# Patient Record
Sex: MALE | Race: WHITE | NOT HISPANIC OR LATINO | Employment: FULL TIME | URBAN - METROPOLITAN AREA
[De-identification: names, ages, dates, MRNs, and addresses within clinical notes are randomized per-mention and may not be internally consistent; named-entity substitution may affect disease eponyms.]

---

## 2017-05-22 ENCOUNTER — OFFICE VISIT - HEALTHEAST (OUTPATIENT)
Dept: FAMILY MEDICINE | Facility: CLINIC | Age: 47
End: 2017-05-22

## 2017-05-22 DIAGNOSIS — R00.0 TACHYCARDIA: ICD-10-CM

## 2017-05-22 DIAGNOSIS — J18.9 PNEUMONIA: ICD-10-CM

## 2017-05-22 DIAGNOSIS — R05.9 COUGH: ICD-10-CM

## 2017-05-22 DIAGNOSIS — J02.0 STREP THROAT: ICD-10-CM

## 2017-05-22 DIAGNOSIS — R50.9 FEVER: ICD-10-CM

## 2017-12-04 ENCOUNTER — OFFICE VISIT - HEALTHEAST (OUTPATIENT)
Dept: FAMILY MEDICINE | Facility: CLINIC | Age: 47
End: 2017-12-04

## 2017-12-04 DIAGNOSIS — Z00.00 HEALTHCARE MAINTENANCE: ICD-10-CM

## 2017-12-04 DIAGNOSIS — Z13.220 SCREENING CHOLESTEROL LEVEL: ICD-10-CM

## 2017-12-04 DIAGNOSIS — R07.9 EXERTIONAL CHEST PAIN: ICD-10-CM

## 2017-12-04 LAB
CHOLEST SERPL-MCNC: 164 MG/DL
FASTING STATUS PATIENT QL REPORTED: YES
HDLC SERPL-MCNC: 45 MG/DL
LDLC SERPL CALC-MCNC: 109 MG/DL
TRIGL SERPL-MCNC: 50 MG/DL

## 2017-12-04 ASSESSMENT — MIFFLIN-ST. JEOR: SCORE: 1782.52

## 2017-12-18 ENCOUNTER — HOSPITAL ENCOUNTER (OUTPATIENT)
Dept: CARDIOLOGY | Facility: CLINIC | Age: 47
Discharge: HOME | End: 2017-12-18
Attending: FAMILY MEDICINE

## 2017-12-18 DIAGNOSIS — R07.9 EXERTIONAL CHEST PAIN: ICD-10-CM

## 2017-12-18 LAB
CV STRESS CURRENT BP HE: NORMAL
CV STRESS CURRENT HR HE: 103
CV STRESS CURRENT HR HE: 104
CV STRESS CURRENT HR HE: 106
CV STRESS CURRENT HR HE: 112
CV STRESS CURRENT HR HE: 112
CV STRESS CURRENT HR HE: 114
CV STRESS CURRENT HR HE: 117
CV STRESS CURRENT HR HE: 119
CV STRESS CURRENT HR HE: 125
CV STRESS CURRENT HR HE: 131
CV STRESS CURRENT HR HE: 137
CV STRESS CURRENT HR HE: 138
CV STRESS CURRENT HR HE: 141
CV STRESS CURRENT HR HE: 147
CV STRESS CURRENT HR HE: 155
CV STRESS CURRENT HR HE: 160
CV STRESS CURRENT HR HE: 160
CV STRESS CURRENT HR HE: 80
CV STRESS CURRENT HR HE: 90
CV STRESS CURRENT HR HE: 91
CV STRESS CURRENT HR HE: 92
CV STRESS CURRENT HR HE: 94
CV STRESS CURRENT HR HE: 94
CV STRESS CURRENT HR HE: 97
CV STRESS CURRENT HR HE: 98
CV STRESS CURRENT HR HE: 99
CV STRESS DEVIATION TIME HE: NORMAL
CV STRESS ECHO PERCENT HR HE: NORMAL
CV STRESS EXERCISE STAGE HE: NORMAL
CV STRESS FINAL RESTING BP HE: NORMAL
CV STRESS FINAL RESTING HR HE: 91
CV STRESS MAX HR HE: 161
CV STRESS MAX TREADMILL GRADE HE: 16
CV STRESS MAX TREADMILL SPEED HE: 4.2
CV STRESS PEAK DIA BP HE: NORMAL
CV STRESS PEAK SYS BP HE: NORMAL
CV STRESS PHASE HE: NORMAL
CV STRESS PROTOCOL HE: NORMAL
CV STRESS RESTING PT POSITION HE: NORMAL
CV STRESS RESTING PT POSITION HE: NORMAL
CV STRESS ST DEVIATION AMOUNT HE: NORMAL
CV STRESS ST DEVIATION ELEVATION HE: NORMAL
CV STRESS ST EVELATION AMOUNT HE: NORMAL
CV STRESS TEST TYPE HE: NORMAL
CV STRESS TOTAL STAGE TIME MIN 1 HE: NORMAL
ECHO EJECTION FRACTION ESTIMATED: 60 %
STRESS ECHO BASELINE BP: NORMAL
STRESS ECHO BASELINE HR: 81
STRESS ECHO CALCULATED PERCENT HR: 93 %
STRESS ECHO LAST STRESS BP: NORMAL
STRESS ECHO LAST STRESS HR: 160
STRESS ECHO POST ESTIMATED WORKLOAD: 12.1
STRESS ECHO POST EXERCISE DUR MIN: 10
STRESS ECHO POST EXERCISE DUR SEC: 20
STRESS ECHO TARGET HR: 147

## 2018-01-08 ENCOUNTER — OFFICE VISIT - HEALTHEAST (OUTPATIENT)
Dept: FAMILY MEDICINE | Facility: CLINIC | Age: 48
End: 2018-01-08

## 2018-01-08 DIAGNOSIS — R07.89 OTHER CHEST PAIN: ICD-10-CM

## 2018-01-08 ASSESSMENT — MIFFLIN-ST. JEOR: SCORE: 1796.13

## 2018-01-10 ENCOUNTER — OFFICE VISIT - HEALTHEAST (OUTPATIENT)
Dept: CARDIOLOGY | Facility: CLINIC | Age: 48
End: 2018-01-10

## 2018-01-10 DIAGNOSIS — I20.0 UNSTABLE ANGINA PECTORIS (H): ICD-10-CM

## 2018-01-10 ASSESSMENT — MIFFLIN-ST. JEOR: SCORE: 1796.13

## 2018-01-18 ENCOUNTER — COMMUNICATION - HEALTHEAST (OUTPATIENT)
Dept: CARDIOLOGY | Facility: CLINIC | Age: 48
End: 2018-01-18

## 2018-01-23 ENCOUNTER — HOSPITAL ENCOUNTER (OUTPATIENT)
Dept: CT IMAGING | Facility: CLINIC | Age: 48
Discharge: HOME | End: 2018-01-23
Attending: INTERNAL MEDICINE

## 2018-01-23 DIAGNOSIS — I20.0 UNSTABLE ANGINA PECTORIS (H): ICD-10-CM

## 2018-01-23 LAB
CREAT BLD-MCNC: 0.9 MG/DL
POC GFR AMER AF HE - HISTORICAL: >60 ML/MIN/1.73M2
POC GFR NON AMER AF HE - HISTORICAL: >60 ML/MIN/1.73M2

## 2018-01-23 ASSESSMENT — MIFFLIN-ST. JEOR: SCORE: 1796.13

## 2018-02-05 ENCOUNTER — OFFICE VISIT - HEALTHEAST (OUTPATIENT)
Dept: FAMILY MEDICINE | Facility: CLINIC | Age: 48
End: 2018-02-05

## 2018-02-05 DIAGNOSIS — J35.1 SWELLING OF TONSIL: ICD-10-CM

## 2018-02-05 DIAGNOSIS — J02.9 SORE THROAT: ICD-10-CM

## 2018-02-05 ASSESSMENT — MIFFLIN-ST. JEOR: SCORE: 1805.2

## 2018-02-16 ENCOUNTER — COMMUNICATION - HEALTHEAST (OUTPATIENT)
Dept: FAMILY MEDICINE | Facility: CLINIC | Age: 48
End: 2018-02-16

## 2018-03-03 ENCOUNTER — COMMUNICATION - HEALTHEAST (OUTPATIENT)
Dept: FAMILY MEDICINE | Facility: CLINIC | Age: 48
End: 2018-03-03

## 2018-03-11 ENCOUNTER — COMMUNICATION - HEALTHEAST (OUTPATIENT)
Dept: FAMILY MEDICINE | Facility: CLINIC | Age: 48
End: 2018-03-11

## 2018-03-28 ENCOUNTER — COMMUNICATION - HEALTHEAST (OUTPATIENT)
Dept: FAMILY MEDICINE | Facility: CLINIC | Age: 48
End: 2018-03-28

## 2018-05-18 ENCOUNTER — HOSPITAL ENCOUNTER (EMERGENCY)
Facility: CLINIC | Age: 48
Discharge: HOME | End: 2018-05-19
Attending: EMERGENCY MEDICINE | Admitting: EMERGENCY MEDICINE
Payer: OTHER MISCELLANEOUS

## 2018-05-18 VITALS
SYSTOLIC BLOOD PRESSURE: 132 MMHG | OXYGEN SATURATION: 95 % | HEIGHT: 67 IN | BODY MASS INDEX: 34.21 KG/M2 | TEMPERATURE: 98.9 F | WEIGHT: 218 LBS | HEART RATE: 94 BPM | DIASTOLIC BLOOD PRESSURE: 86 MMHG | RESPIRATION RATE: 14 BRPM

## 2018-05-18 DIAGNOSIS — S61.512A WRIST LACERATION, LEFT, INITIAL ENCOUNTER: ICD-10-CM

## 2018-05-18 PROCEDURE — 99283 EMERGENCY DEPT VISIT LOW MDM: CPT

## 2018-05-18 PROCEDURE — 12001 RPR S/N/AX/GEN/TRNK 2.5CM/<: CPT | Mod: LT

## 2018-05-18 RX ORDER — MULTIPLE VITAMINS W/ MINERALS TAB 9MG-400MCG
1 TAB ORAL DAILY
COMMUNITY

## 2018-05-18 RX ORDER — LORATADINE 10 MG/1
10 TABLET ORAL DAILY
COMMUNITY
End: 2022-05-05

## 2018-05-18 NOTE — ED AVS SNAPSHOT
[facility] Emergency Department    [address] [place] [address]    Phone:  [phone]    Fax:  [phone]                                       Raheem Jeong   MRN: [id number]    Department:  [facility] Emergency Department   Date of Visit:  5/18/2018           Patient Information     Date Of Birth          1970        Your diagnoses for this visit were:     Wrist laceration, left, initial encounter        You were seen by Marleny Robertson MD.      Follow-up Information     Follow up with Clinic, [facility] In 1 week.    Why:  For suture removal    Contact information:    [address]  [facility] [id number] [phone]          Discharge Instructions          * LACERATION (All Closures)  A laceration is a cut through the skin. This will usually require stitches (sutures) or staples if it is deep. Minor cuts may be treated with a tape closure ( Steri-Strips ) or Dermabond skin glue.       HOME CARE:  PAIN MEDICINE: You may use acetaminophen (Tylenol) 650-1000 mg every 6 hours or ibuprofen (Motrin, Advil) 600 mg every 6-8 hours with food to control pain, if you are able to take these medicines. [NOTE: If you have chronic liver or kidney disease or ever had a stomach ulcer or GI bleeding, talk with your doctor before using these medicines.]  EXTREMITY, FACE or TRUNK WOUNDS:    Keep the wound clean and dry. If a bandage was applied and it becomes wet or dirty, replace it. Otherwise, leave it in place for the first 24 hours.    If stitches or staples were used, clean the wound daily. Protect the wound from sunlight and tanning lamps.    After removing the bandage, wash the area with soap and water. Use a wet cotton swab (Q tip) to loosen and remove any blood or crust that forms.    After cleaning, apply a thin layer of Polysporin or Bacitracin ointment. This will keep the wound clean and make it easier to remove the stitches or staples. Reapply a  fresh bandage.    You may remove the bandage to shower as usual after the first 24 hours, but do not soak the area in water (no swimming) until the stitches or staples are removed.    If Steri-Strips were used, keep the area clean and dry. If it becomes wet, blot it dry with a towel. It is okay to take a brief shower, but avoid scrubbing the area.    If Dermabond skin adhesive was used, do not scratch, rub or pick at the adhesive film. Do not place tape directly over the film. Do not apply liquid, ointment or creams to the wound while the film is in place. Do not clean the wound with peroxide and do not apply ointments. Avoid activities that cause heavy sweating until the film has fallen off. Protect the wound from prolonged exposure to sunlight or tanning lamps. You may shower as usual but do not soak the wound in water (no baths or swimming). The film will fall off by itself in 5-10 days.  SCALP WOUNDS: During the first two days, you may carefully rinse your hair in the shower to remove blood, glass or dirt particles. After two days, you may shower and shampoo your hair normally. Do not soak your scalp in the tub or go swimming until the stitches or staples have been removed.  MOUTH WOUNDS: Eat soft foods to reduce pain. If the cut is inside of your mouth, clean by rinsing after each meal and at bedtime with a mixture of equal parts water and Hydrogen Peroxide (do not swallow!). Or, you can use a cotton swab to directly apply Hydrogen Peroxide onto the cut.  After the wound is done healing, use sunscreen over the area whenever exposed for the next 6 minths to avoid a darker scar.     FOLLOW UP: Most skin wounds heal within ten days. Mouth and facial wounds heal within five days. However, even with proper treatment, a wound infection may sometimes occur. Therefore, you should check the wound daily for signs of infection listed below.  Stitches should be removed from the face within five days; stitches and staples  should be removed from other parts of the body within 7-10 days. Unless you are told to come back to the emergency room, you may have your doctor or urgent care remove the stitches. If dissolving stitches were used in the mouth, these will fall out or dissolve without the need for removal. If tape closures ( Steri-Strips ) were used, remove them yourself if they have not fallen off after 7 days. If Dermabond skin glue was used, the film will fall off by itself in 5-10 days.      GET PROMPT MEDICAL ATTENTION  if any of the following occur:    Increasing pain in the wound    Redness, swelling or pus coming from the wound    Fever over 101 F (38.3 C) oral    If stitches or staples come apart or fall out or if Steri-Strips fall off before seven days    If the wound edges re-open    Bleeding not controlled by direct pressure    8349-0866 [employer]. [address] [place] [address]. All rights reserved. This information is not intended as a substitute for professional medical care. Always follow your healthcare professional's instructions.  This information has been modified by your health care provider with permission from the publisher.      24 Hour Appointment Hotline       To make an appointment at any [facility], call [phone] ([phone]). If you don't have a family doctor or clinic, we will help you find one. [facility] are conveniently located to serve the needs of you and your family.             Review of your medicines      Our records show that you are taking the medicines listed below. If these are incorrect, please call your family doctor or clinic.        Dose / Directions Last dose taken    loratadine 10 MG tablet   Commonly known as:  CLARITIN   Dose:  10 mg        Take 10 mg by mouth daily   Refills:  0        Multi-vitamin Tabs tablet   Dose:  1 tablet        Take 1 tablet by mouth daily   Refills:  0                Procedures and tests performed during your  visit     XR Wrist Left 2 Views      Orders Needing Specimen Collection     None      Pending Results     No orders found for last 3 day(s).            Pending Culture Results     No orders found for last 3 day(s).            Pending Results Instructions     If you had any lab results that were not finalized at the time of your Discharge, you can call the ED Lab Result RN at [phone]. You will be contacted by this team for any positive Lab results or changes in treatment. The nurses are available 7 days a week from 10A to 6:30P.  You can leave a message 24 hours per day and they will return your call.        Test Results From Your Hospital Stay        5/19/2018  1:04 AM      Narrative     XR WRIST LT  2 VW 5/19/2018 1:01 AM    HISTORY: Puncture wound.    COMPARISON: None.        Impression     IMPRESSION: No evidence of acute fracture or malalignment. No bony  abnormalities. No radiopaque foreign bodies appreciated.    DARREN RUFFIN MD                Clinical Quality Measure: Blood Pressure Screening     Your blood pressure was checked while you were in the emergency department today. The last reading we obtained was  BP: 132/86 . Please read the guidelines below about what these numbers mean and what you should do about them.  If your systolic blood pressure (the top number) is less than 120 and your diastolic blood pressure (the bottom number) is less than 80, then your blood pressure is normal. There is nothing more that you need to do about it.  If your systolic blood pressure (the top number) is 120-139 or your diastolic blood pressure (the bottom number) is 80-89, your blood pressure may be higher than it should be. You should have your blood pressure rechecked within a year by a primary care provider.  If your systolic blood pressure (the top number) is 140 or greater or your diastolic blood pressure (the bottom number) is 90 or greater, you may have high blood pressure. High blood pressure is treatable,  "but if left untreated over time it can put you at risk for heart attack, stroke, or kidney failure. You should have your blood pressure rechecked by a primary care provider within the next 4 weeks.  If your provider in the emergency department today gave you specific instructions to follow-up with your doctor or provider even sooner than that, you should follow that instruction and not wait for up to 4 weeks for your follow-up visit.        Thank you for choosing [facility]       Thank you for choosing [facility] for your care. Our goal is always to provide you with excellent care. Hearing back from our patients is one way we can continue to improve our services. Please take a few minutes to complete the written survey that you may receive in the mail after you visit with us. Thank you!        [employer] [facility] Information     [employer] lets you send messages to your doctor, view your test results, renew your prescriptions, schedule appointments and more. To sign up, go to [email or web address] . Click on \"Log in\" on the left side of the screen, which will take you to the Welcome page. Then click on \"Sign up Now\" on the right side of the page.     You will be asked to enter the access code listed below, as well as some personal information. Please follow the directions to create your username and password.     Your access code is: [email or web address]  Expires: 2018  1:48 AM     Your access code will  in 90 days. If you need help or a new code, please call your [facility] clinic or [phone].        Care EveryWhere ID     This is your Care EveryWhere ID. This could be used by other organizations to access your [facility] medical records  [id number]        Equal Access to Services     [facility] : Rivera Whitmore, adarsh arguello, lars aguilar. So [facility] [phone].    ATENCIÓN: Si habla español, tiene a clayton disposición servicios gratuitos de asistencia " brittany Stoddarddominic al [phone].    We comply with applicable federal civil rights laws and [place] laws. We do not discriminate on the basis of race, color, national origin, age, disability, sex, sexual orientation, or gender identity.            After Visit Summary       This is your record. Keep this with you and show to your community pharmacist(s) and doctor(s) at your next visit.

## 2018-05-18 NOTE — LETTER
May 18, 2018      To Whom It May Concern:      Raheem Jeong was seen in our Emergency Department today, 05/18/18.  I expect his condition to improve over the next 4 days.  He may return to work 05/22/18.    Sincerely,        Karen Eden RN

## 2018-05-18 NOTE — LETTER
May 18, 2018      To Whom It May Concern:      Raheem Jeong was seen in our Emergency Department today, 05/18/18.  I expect his condition to improve over the next 3-4 days.  He may return to work when improved.    Sincerely,        BAUDILIO Cobos RN for Dr. Marcelo MD

## 2018-05-18 NOTE — ED AVS SNAPSHOT
[facility] Emergency Department    [address]    [facility] [address]    Phone:  [phone]    Fax:  [phone]                                       Raheem Jeong   MRN: [id number]    Department:  [facility] Emergency Department   Date of Visit:  5/18/2018           After Visit Summary Signature Page     I have received my discharge instructions, and my questions have been answered. I have discussed any challenges I see with this plan with the nurse or doctor.    ..........................................................................................................................................  Patient/Patient Representative Signature      ..........................................................................................................................................  Patient Representative Print Name and Relationship to Patient    ..................................................               ................................................  Date                                            Time    ..........................................................................................................................................  Reviewed by Signature/Title    ...................................................              ..............................................  Date                                                            Time

## 2018-05-19 ENCOUNTER — APPOINTMENT (OUTPATIENT)
Dept: GENERAL RADIOLOGY | Facility: CLINIC | Age: 48
End: 2018-05-19
Attending: EMERGENCY MEDICINE
Payer: OTHER MISCELLANEOUS

## 2018-05-19 ENCOUNTER — RECORDS - HEALTHEAST (OUTPATIENT)
Dept: ADMINISTRATIVE | Facility: OTHER | Age: 48
End: 2018-05-19

## 2018-05-19 PROCEDURE — 73100 X-RAY EXAM OF WRIST: CPT | Mod: LT

## 2018-05-19 RX ORDER — BUPIVACAINE HYDROCHLORIDE 5 MG/ML
INJECTION, SOLUTION PERINEURAL
Status: DISCONTINUED
Start: 2018-05-19 | End: 2018-05-19 | Stop reason: HOSPADM

## 2018-05-19 ASSESSMENT — ENCOUNTER SYMPTOMS
ARTHRALGIAS: 1
WOUND: 1
JOINT SWELLING: 1

## 2018-05-19 NOTE — DISCHARGE INSTRUCTIONS
* LACERATION (All Closures)  A laceration is a cut through the skin. This will usually require stitches (sutures) or staples if it is deep. Minor cuts may be treated with a tape closure ( Steri-Strips ) or Dermabond skin glue.       HOME CARE:  PAIN MEDICINE: You may use acetaminophen (Tylenol) 650-1000 mg every 6 hours or ibuprofen (Motrin, Advil) 600 mg every 6-8 hours with food to control pain, if you are able to take these medicines. [NOTE: If you have chronic liver or kidney disease or ever had a stomach ulcer or GI bleeding, talk with your doctor before using these medicines.]  EXTREMITY, FACE or TRUNK WOUNDS:    Keep the wound clean and dry. If a bandage was applied and it becomes wet or dirty, replace it. Otherwise, leave it in place for the first 24 hours.    If stitches or staples were used, clean the wound daily. Protect the wound from sunlight and tanning lamps.    After removing the bandage, wash the area with soap and water. Use a wet cotton swab (Q tip) to loosen and remove any blood or crust that forms.    After cleaning, apply a thin layer of Polysporin or Bacitracin ointment. This will keep the wound clean and make it easier to remove the stitches or staples. Reapply a fresh bandage.    You may remove the bandage to shower as usual after the first 24 hours, but do not soak the area in water (no swimming) until the stitches or staples are removed.    If Steri-Strips were used, keep the area clean and dry. If it becomes wet, blot it dry with a towel. It is okay to take a brief shower, but avoid scrubbing the area.    If Dermabond skin adhesive was used, do not scratch, rub or pick at the adhesive film. Do not place tape directly over the film. Do not apply liquid, ointment or creams to the wound while the film is in place. Do not clean the wound with peroxide and do not apply ointments. Avoid activities that cause heavy sweating until the film has fallen off. Protect the wound from prolonged  exposure to sunlight or tanning lamps. You may shower as usual but do not soak the wound in water (no baths or swimming). The film will fall off by itself in 5-10 days.  SCALP WOUNDS: During the first two days, you may carefully rinse your hair in the shower to remove blood, glass or dirt particles. After two days, you may shower and shampoo your hair normally. Do not soak your scalp in the tub or go swimming until the stitches or staples have been removed.  MOUTH WOUNDS: Eat soft foods to reduce pain. If the cut is inside of your mouth, clean by rinsing after each meal and at bedtime with a mixture of equal parts water and Hydrogen Peroxide (do not swallow!). Or, you can use a cotton swab to directly apply Hydrogen Peroxide onto the cut.  After the wound is done healing, use sunscreen over the area whenever exposed for the next 6 minths to avoid a darker scar.     FOLLOW UP: Most skin wounds heal within ten days. Mouth and facial wounds heal within five days. However, even with proper treatment, a wound infection may sometimes occur. Therefore, you should check the wound daily for signs of infection listed below.  Stitches should be removed from the face within five days; stitches and staples should be removed from other parts of the body within 7-10 days. Unless you are told to come back to the emergency room, you may have your doctor or urgent care remove the stitches. If dissolving stitches were used in the mouth, these will fall out or dissolve without the need for removal. If tape closures ( Steri-Strips ) were used, remove them yourself if they have not fallen off after 7 days. If Dermabond skin glue was used, the film will fall off by itself in 5-10 days.      GET PROMPT MEDICAL ATTENTION  if any of the following occur:    Increasing pain in the wound    Redness, swelling or pus coming from the wound    Fever over 101 F (38.3 C) oral    If stitches or staples come apart or fall out or if Steri-Strips fall  off before seven days    If the wound edges re-open    Bleeding not controlled by direct pressure    [id number] [employer]. [address] [place] [address], [place] [address]. All rights reserved. This information is not intended as a substitute for professional medical care. Always follow your healthcare professional's instructions.  This information has been modified by your health care provider with permission from the publisher.

## 2018-05-19 NOTE — ED TRIAGE NOTES
Pt reports he was changing a blade in a machine at work and tightening it with a wrench when the bar with the blades in it tipped over and cut his left hand. Pt states it was very deep, pt is having pain with moving his hand. Injury happened at about 10:10 pm tonight. Unknown last tetanus.

## 2018-05-19 NOTE — ED PROVIDER NOTES
"  History     Chief Complaint:  Laceration      HPI   Raheem Jeong is a 47 year old male who presents to the emergency department for evaluation of left wrist laceration. The patient states that he was changing the utility blade on a piece of machinery while working this evening when the blade slipped, causing him to sustain a laceration to the dorsum of his left wrist. The patient feels that this laceration is fairly deep and had some subsequent swelling and pain to his left wrist, especially with movement of his fingers. This prompted his ED visit. He denies sustaining any other injuries as a result of this incident. Of note, the patient's last tetanus vaccination was 2012.    Allergies:  No Known Allergies     Medications:    Loratadine  Multivitamins    Past Medical History:    ANTHONY    Past Surgical History:    Hernia Repair    Family History:    No past pertinent family history.    Social History:  Presents alone.   Marital Status:   [2]    Review of Systems   Musculoskeletal: Positive for arthralgias (Left Wrist) and joint swelling.   Skin: Positive for wound.   All other systems reviewed and are negative.    Physical Exam     Patient Vitals for the past 24 hrs:   BP Temp Temp src Pulse Resp SpO2 Height Weight   05/18/18 2340 132/86 98.9  F (37.2  C) Oral 94 14 95 % 1.702 m (5' 7\") 98.9 kg (218 lb)       Physical Exam  General: Patient is alert and interactive when I enter the room  Head:  The scalp, face, and head appear normal  Eyes:  Conjunctivae are normal  ENT:    The nose is normal    Pinnae are normal    External acoustic canals are normal  Neck:  Trachea midline  CV:  Pulses are normal    Resp:  No respiratory distress   Abdomen:      Soft, non-tender, non-distended  Musc:  Normal muscular tone    No major joint effusions    Full flexion and extension intact of digits of left hand. Pain with extension of 4th finger against resistance, sensation intact distally   Skin:  9.5 cm laceration to ulnar " aspect of dorsum of hand, no tendon laceration appreciated, hemostatic  Neuro:  Speech is normal and fluent. Face is symmetric.     Moving all extremities well.   Psych: Awake. Alert.  Normal affect.  Appropriate interactions.    Emergency Department Course   Imaging:  Radiographic findings were communicated with the patient who voiced understanding of the findings.    XR Wrist, Left, 2 views:   No evidence of acute fracture or malalignment. No bony  abnormalities. No radiopaque foreign bodies appreciated. As per radiology.     Procedures:    Narrative: Procedure: Laceration Repair        LACERATION:  A clean 0.5 cm laceration.      LOCATION:  Dorsum of the left wrist over the ulnar aspect       FUNCTION:  Distally sensation, circulation, motor and tendon function are intact.      ANESTHESIA:  Local using Bupivacaine 0.5%       PREPARATION:  Irrigation and Scrubbing with Normal Saline and Shur Clens      DEBRIDEMENT:  wound explored, no foreign body found      CLOSURE:  Wound was closed with One Layer.  Skin closed with 1 x 5.0 Ethylon using interrupted sutures.    Emergency Department Course:  Nursing notes and vitals reviewed. 1158 I performed an exam of the patient as documented above.     The patient was sent for a Wrist XR while in the emergency department, findings above.    0110 I rechecked the patient and discussed the results of his workup thus far.      0130 Laceration repair was performed, as noted above.     Findings and plan explained to the Patient. Patient discharged home with instructions regarding supportive care, medications, and reasons to return. The importance of close follow-up was reviewed.     Impression & Plan    Medical Decision Making:  Raheem Jeong is a 47 year old male who presents for evaluation of a laceration to the left wrist. XR was performed and is negative for acute fracture or dislocation. The wound was carefully evaluated and explored.  The laceration was closed with sutures as  noted above.  There is no evidence of muscular, tendon, or bony damage with this laceration.  However he does report stiffness and pain with ulnar deviation of his wrist.  I explored his laceration and it does not appear to be any tendon laceration however the last operation does go deep approximately.  I think tendon laceration is less likely given his normal strength on exam however if he continues to have pain and issues I will have him follow-up with a hand surgeon.  No signs of foreign body.  Possible complications (infection, scarring) were reviewed with the patient.  Follow up with primary care as noted in the discharge section.    Diagnosis:    ICD-10-CM   1. Wrist laceration, left, initial encounter S61.512A     Disposition:  discharged to home  I, Bere Rodríguez, am serving as a scribe on 5/18/2018 at 11:58 PM to personally document services performed by Marleny Robertson MD based on my observations and the provider's statements to me.     Bere Rodríguez  5/18/2018   [facility] EMERGENCY DEPARTMENT       Marleny Robertson MD  05/20/18 0356

## 2018-05-21 ENCOUNTER — RECORDS - HEALTHEAST (OUTPATIENT)
Dept: ADMINISTRATIVE | Facility: OTHER | Age: 48
End: 2018-05-21

## 2018-05-25 ENCOUNTER — RECORDS - HEALTHEAST (OUTPATIENT)
Dept: ADMINISTRATIVE | Facility: OTHER | Age: 48
End: 2018-05-25

## 2018-06-14 ENCOUNTER — RECORDS - HEALTHEAST (OUTPATIENT)
Dept: ADMINISTRATIVE | Facility: OTHER | Age: 48
End: 2018-06-14

## 2018-07-16 ENCOUNTER — RECORDS - HEALTHEAST (OUTPATIENT)
Dept: ADMINISTRATIVE | Facility: OTHER | Age: 48
End: 2018-07-16

## 2018-07-20 ENCOUNTER — COMMUNICATION - HEALTHEAST (OUTPATIENT)
Dept: SCHEDULING | Facility: CLINIC | Age: 48
End: 2018-07-20

## 2018-07-23 ENCOUNTER — OFFICE VISIT - HEALTHEAST (OUTPATIENT)
Dept: FAMILY MEDICINE | Facility: CLINIC | Age: 48
End: 2018-07-23

## 2018-07-23 DIAGNOSIS — Z01.818 PREOP GENERAL PHYSICAL EXAM: ICD-10-CM

## 2018-07-23 LAB
BASOPHILS # BLD AUTO: 0 THOU/UL (ref 0–0.2)
BASOPHILS NFR BLD AUTO: 1 % (ref 0–2)
EOSINOPHIL # BLD AUTO: 0.2 THOU/UL (ref 0–0.4)
EOSINOPHIL NFR BLD AUTO: 4 % (ref 0–6)
ERYTHROCYTE [DISTWIDTH] IN BLOOD BY AUTOMATED COUNT: 11.9 % (ref 11–14.5)
HCT VFR BLD AUTO: 43.3 % (ref 40–54)
HGB BLD-MCNC: 15.3 G/DL (ref 14–18)
LYMPHOCYTES # BLD AUTO: 1.9 THOU/UL (ref 0.8–4.4)
LYMPHOCYTES NFR BLD AUTO: 40 % (ref 20–40)
MCH RBC QN AUTO: 31.5 PG (ref 27–34)
MCHC RBC AUTO-ENTMCNC: 35.4 G/DL (ref 32–36)
MCV RBC AUTO: 89 FL (ref 80–100)
MONOCYTES # BLD AUTO: 0.4 THOU/UL (ref 0–0.9)
MONOCYTES NFR BLD AUTO: 9 % (ref 2–10)
NEUTROPHILS # BLD AUTO: 2.2 THOU/UL (ref 2–7.7)
NEUTROPHILS NFR BLD AUTO: 47 % (ref 50–70)
PLATELET # BLD AUTO: 237 THOU/UL (ref 140–440)
PMV BLD AUTO: 8.3 FL (ref 7–10)
POTASSIUM BLD-SCNC: 4.2 MMOL/L (ref 3.5–5)
RBC # BLD AUTO: 4.87 MILL/UL (ref 4.4–6.2)
WBC: 4.7 THOU/UL (ref 4–11)

## 2018-07-23 ASSESSMENT — MIFFLIN-ST. JEOR: SCORE: 1849.88

## 2018-07-25 ENCOUNTER — RECORDS - HEALTHEAST (OUTPATIENT)
Dept: ADMINISTRATIVE | Facility: OTHER | Age: 48
End: 2018-07-25

## 2018-08-06 ENCOUNTER — RECORDS - HEALTHEAST (OUTPATIENT)
Dept: ADMINISTRATIVE | Facility: OTHER | Age: 48
End: 2018-08-06

## 2018-09-06 ENCOUNTER — RECORDS - HEALTHEAST (OUTPATIENT)
Dept: ADMINISTRATIVE | Facility: OTHER | Age: 48
End: 2018-09-06

## 2018-09-07 ENCOUNTER — RECORDS - HEALTHEAST (OUTPATIENT)
Dept: ADMINISTRATIVE | Facility: OTHER | Age: 48
End: 2018-09-07

## 2018-09-10 ENCOUNTER — RECORDS - HEALTHEAST (OUTPATIENT)
Dept: ADMINISTRATIVE | Facility: OTHER | Age: 48
End: 2018-09-10

## 2018-09-17 ENCOUNTER — RECORDS - HEALTHEAST (OUTPATIENT)
Dept: ADMINISTRATIVE | Facility: OTHER | Age: 48
End: 2018-09-17

## 2018-09-24 ENCOUNTER — RECORDS - HEALTHEAST (OUTPATIENT)
Dept: ADMINISTRATIVE | Facility: OTHER | Age: 48
End: 2018-09-24

## 2018-10-29 ENCOUNTER — RECORDS - HEALTHEAST (OUTPATIENT)
Dept: ADMINISTRATIVE | Facility: OTHER | Age: 48
End: 2018-10-29

## 2018-11-14 ENCOUNTER — OFFICE VISIT - HEALTHEAST (OUTPATIENT)
Dept: FAMILY MEDICINE | Facility: CLINIC | Age: 48
End: 2018-11-14

## 2018-11-14 DIAGNOSIS — B30.9 ACUTE VIRAL CONJUNCTIVITIS OF BOTH EYES: ICD-10-CM

## 2018-11-14 DIAGNOSIS — J01.90 ACUTE NON-RECURRENT SINUSITIS, UNSPECIFIED LOCATION: ICD-10-CM

## 2018-11-14 ASSESSMENT — MIFFLIN-ST. JEOR: SCORE: 1914.06

## 2018-12-10 ENCOUNTER — RECORDS - HEALTHEAST (OUTPATIENT)
Dept: ADMINISTRATIVE | Facility: OTHER | Age: 48
End: 2018-12-10

## 2019-01-21 ENCOUNTER — RECORDS - HEALTHEAST (OUTPATIENT)
Dept: ADMINISTRATIVE | Facility: OTHER | Age: 49
End: 2019-01-21

## 2019-02-06 ENCOUNTER — OFFICE VISIT - HEALTHEAST (OUTPATIENT)
Dept: FAMILY MEDICINE | Facility: CLINIC | Age: 49
End: 2019-02-06

## 2019-02-06 DIAGNOSIS — Z80.42 FAMILY HISTORY OF PROSTATE CANCER: ICD-10-CM

## 2019-02-06 DIAGNOSIS — R53.83 LOW ENERGY: ICD-10-CM

## 2019-02-06 DIAGNOSIS — M19.90 ARTHRITIS: ICD-10-CM

## 2019-02-06 DIAGNOSIS — F43.21 ADJUSTMENT DISORDER WITH DEPRESSED MOOD: ICD-10-CM

## 2019-02-06 DIAGNOSIS — N52.9 PRIMARY ERECTILE DYSFUNCTION: ICD-10-CM

## 2019-02-06 DIAGNOSIS — Z11.3 ROUTINE SCREENING FOR STI (SEXUALLY TRANSMITTED INFECTION): ICD-10-CM

## 2019-02-06 DIAGNOSIS — I20.0 UNSTABLE ANGINA PECTORIS (H): ICD-10-CM

## 2019-02-06 DIAGNOSIS — Z00.00 ROUTINE HEALTH MAINTENANCE: ICD-10-CM

## 2019-02-06 DIAGNOSIS — R53.82 CHRONIC FATIGUE: ICD-10-CM

## 2019-02-06 LAB
ALBUMIN SERPL-MCNC: 4.4 G/DL (ref 3.5–5)
ALP SERPL-CCNC: 52 U/L (ref 45–120)
ALT SERPL W P-5'-P-CCNC: 18 U/L (ref 0–45)
ANION GAP SERPL CALCULATED.3IONS-SCNC: 7 MMOL/L (ref 5–18)
AST SERPL W P-5'-P-CCNC: 22 U/L (ref 0–40)
BILIRUB SERPL-MCNC: 0.9 MG/DL (ref 0–1)
BUN SERPL-MCNC: 10 MG/DL (ref 8–22)
CALCIUM SERPL-MCNC: 10.7 MG/DL (ref 8.5–10.5)
CHLORIDE BLD-SCNC: 106 MMOL/L (ref 98–107)
CHOLEST SERPL-MCNC: 165 MG/DL
CO2 SERPL-SCNC: 28 MMOL/L (ref 22–31)
CREAT SERPL-MCNC: 0.81 MG/DL (ref 0.7–1.3)
FASTING STATUS PATIENT QL REPORTED: YES
GFR SERPL CREATININE-BSD FRML MDRD: >60 ML/MIN/1.73M2
GLUCOSE BLD-MCNC: 89 MG/DL (ref 70–125)
HDLC SERPL-MCNC: 48 MG/DL
HIV 1+2 AB+HIV1 P24 AG SERPL QL IA: NEGATIVE
LDLC SERPL CALC-MCNC: 101 MG/DL
POTASSIUM BLD-SCNC: 4.1 MMOL/L (ref 3.5–5)
PROT SERPL-MCNC: 7.6 G/DL (ref 6–8)
PSA SERPL-MCNC: 0.9 NG/ML (ref 0–2.5)
SODIUM SERPL-SCNC: 141 MMOL/L (ref 136–145)
TRIGL SERPL-MCNC: 81 MG/DL

## 2019-02-06 ASSESSMENT — MIFFLIN-ST. JEOR: SCORE: 1882.31

## 2019-02-07 LAB
C TRACH DNA SPEC QL PROBE+SIG AMP: NEGATIVE
N GONORRHOEA DNA SPEC QL NAA+PROBE: NEGATIVE
T PALLIDUM AB SER QL: NEGATIVE

## 2019-02-08 LAB
HAV IGM SERPL QL IA: NEGATIVE
HBV CORE IGM SERPL QL IA: NEGATIVE
HBV SURFACE AG SERPL QL IA: NEGATIVE
HCV AB SERPL QL IA: NEGATIVE

## 2019-02-11 LAB
SHBG SERPL-SCNC: 66 NMOL/L (ref 11–80)
TESTOST FREE SERPL-MCNC: 8.21 NG/DL (ref 4.7–24.4)
TESTOST SERPL-MCNC: 615 NG/DL (ref 240–950)

## 2019-02-14 ENCOUNTER — OFFICE VISIT - HEALTHEAST (OUTPATIENT)
Dept: FAMILY MEDICINE | Facility: CLINIC | Age: 49
End: 2019-02-14

## 2019-02-14 DIAGNOSIS — J02.9 SORE THROAT: ICD-10-CM

## 2019-02-14 LAB — DEPRECATED S PYO AG THROAT QL EIA: NORMAL

## 2019-02-15 LAB — GROUP A STREP BY PCR: NORMAL

## 2019-02-25 ENCOUNTER — RECORDS - HEALTHEAST (OUTPATIENT)
Dept: ADMINISTRATIVE | Facility: OTHER | Age: 49
End: 2019-02-25

## 2019-03-05 ENCOUNTER — RECORDS - HEALTHEAST (OUTPATIENT)
Dept: ADMINISTRATIVE | Facility: OTHER | Age: 49
End: 2019-03-05

## 2019-03-06 ENCOUNTER — COMMUNICATION - HEALTHEAST (OUTPATIENT)
Dept: FAMILY MEDICINE | Facility: CLINIC | Age: 49
End: 2019-03-06

## 2019-03-06 DIAGNOSIS — M19.90 ARTHRITIS: ICD-10-CM

## 2019-04-10 ENCOUNTER — COMMUNICATION - HEALTHEAST (OUTPATIENT)
Dept: FAMILY MEDICINE | Facility: CLINIC | Age: 49
End: 2019-04-10

## 2019-04-10 DIAGNOSIS — J31.0 CHRONIC RHINITIS: ICD-10-CM

## 2019-05-06 ENCOUNTER — RECORDS - HEALTHEAST (OUTPATIENT)
Dept: ADMINISTRATIVE | Facility: OTHER | Age: 49
End: 2019-05-06

## 2019-07-29 ENCOUNTER — RECORDS - HEALTHEAST (OUTPATIENT)
Dept: ADMINISTRATIVE | Facility: OTHER | Age: 49
End: 2019-07-29

## 2019-09-05 ENCOUNTER — OFFICE VISIT - HEALTHEAST (OUTPATIENT)
Dept: FAMILY MEDICINE | Facility: CLINIC | Age: 49
End: 2019-09-05

## 2019-09-05 ENCOUNTER — AMBULATORY - HEALTHEAST (OUTPATIENT)
Dept: NURSING | Facility: CLINIC | Age: 49
End: 2019-09-05

## 2019-09-05 DIAGNOSIS — Z01.818 PREOP GENERAL PHYSICAL EXAM: ICD-10-CM

## 2019-09-05 DIAGNOSIS — Z00.00 HEALTHCARE MAINTENANCE: ICD-10-CM

## 2019-09-05 DIAGNOSIS — I20.0 UNSTABLE ANGINA PECTORIS (H): ICD-10-CM

## 2019-09-05 LAB
ATRIAL RATE - MUSE: 64 BPM
DIASTOLIC BLOOD PRESSURE - MUSE: NORMAL MMHG
INTERPRETATION ECG - MUSE: NORMAL
P AXIS - MUSE: 17 DEGREES
PR INTERVAL - MUSE: 158 MS
QRS DURATION - MUSE: 94 MS
QT - MUSE: 398 MS
QTC - MUSE: 410 MS
R AXIS - MUSE: 12 DEGREES
SYSTOLIC BLOOD PRESSURE - MUSE: NORMAL MMHG
T AXIS - MUSE: 17 DEGREES
VENTRICULAR RATE- MUSE: 64 BPM

## 2019-09-05 ASSESSMENT — MIFFLIN-ST. JEOR: SCORE: 1877.77

## 2019-09-10 ENCOUNTER — RECORDS - HEALTHEAST (OUTPATIENT)
Dept: ADMINISTRATIVE | Facility: OTHER | Age: 49
End: 2019-09-10

## 2019-09-16 ENCOUNTER — OFFICE VISIT - HEALTHEAST (OUTPATIENT)
Dept: FAMILY MEDICINE | Facility: CLINIC | Age: 49
End: 2019-09-16

## 2019-09-16 DIAGNOSIS — E66.811 CLASS 1 OBESITY DUE TO EXCESS CALORIES WITHOUT SERIOUS COMORBIDITY WITH BODY MASS INDEX (BMI) OF 34.0 TO 34.9 IN ADULT: ICD-10-CM

## 2019-09-16 DIAGNOSIS — Z71.3 WEIGHT LOSS COUNSELING, ENCOUNTER FOR: ICD-10-CM

## 2019-09-16 DIAGNOSIS — E66.09 CLASS 1 OBESITY DUE TO EXCESS CALORIES WITHOUT SERIOUS COMORBIDITY WITH BODY MASS INDEX (BMI) OF 34.0 TO 34.9 IN ADULT: ICD-10-CM

## 2019-09-16 DIAGNOSIS — B07.9 VERRUCOUS SKIN LESION: ICD-10-CM

## 2019-09-16 ASSESSMENT — MIFFLIN-ST. JEOR: SCORE: 1864.17

## 2019-09-18 ENCOUNTER — RECORDS - HEALTHEAST (OUTPATIENT)
Dept: ADMINISTRATIVE | Facility: OTHER | Age: 49
End: 2019-09-18

## 2019-10-21 ENCOUNTER — RECORDS - HEALTHEAST (OUTPATIENT)
Dept: ADMINISTRATIVE | Facility: OTHER | Age: 49
End: 2019-10-21

## 2019-10-30 ENCOUNTER — RECORDS - HEALTHEAST (OUTPATIENT)
Dept: ADMINISTRATIVE | Facility: OTHER | Age: 49
End: 2019-10-30

## 2019-11-18 ENCOUNTER — COMMUNICATION - HEALTHEAST (OUTPATIENT)
Dept: FAMILY MEDICINE | Facility: CLINIC | Age: 49
End: 2019-11-18

## 2019-11-25 ENCOUNTER — OFFICE VISIT - HEALTHEAST (OUTPATIENT)
Dept: FAMILY MEDICINE | Facility: CLINIC | Age: 49
End: 2019-11-25

## 2019-11-25 DIAGNOSIS — M19.049 ARTHRITIS OF HAND: ICD-10-CM

## 2019-11-25 DIAGNOSIS — G47.33 OSA (OBSTRUCTIVE SLEEP APNEA): ICD-10-CM

## 2019-11-25 DIAGNOSIS — B07.9 VIRAL WARTS, UNSPECIFIED TYPE: ICD-10-CM

## 2019-11-25 DIAGNOSIS — Z01.818 PREOPERATIVE EXAMINATION: ICD-10-CM

## 2019-11-25 ASSESSMENT — MIFFLIN-ST. JEOR: SCORE: 1888.21

## 2019-12-03 ENCOUNTER — RECORDS - HEALTHEAST (OUTPATIENT)
Dept: ADMINISTRATIVE | Facility: OTHER | Age: 49
End: 2019-12-03

## 2019-12-16 ENCOUNTER — RECORDS - HEALTHEAST (OUTPATIENT)
Dept: ADMINISTRATIVE | Facility: OTHER | Age: 49
End: 2019-12-16

## 2019-12-31 ENCOUNTER — RECORDS - HEALTHEAST (OUTPATIENT)
Dept: ADMINISTRATIVE | Facility: OTHER | Age: 49
End: 2019-12-31

## 2020-01-07 ENCOUNTER — RECORDS - HEALTHEAST (OUTPATIENT)
Dept: ADMINISTRATIVE | Facility: OTHER | Age: 50
End: 2020-01-07

## 2020-01-16 ENCOUNTER — OFFICE VISIT (OUTPATIENT)
Dept: SLEEP MEDICINE | Facility: CLINIC | Age: 50
End: 2020-01-16
Payer: COMMERCIAL

## 2020-01-16 VITALS
RESPIRATION RATE: 18 BRPM | WEIGHT: 245.9 LBS | DIASTOLIC BLOOD PRESSURE: 75 MMHG | OXYGEN SATURATION: 95 % | HEART RATE: 72 BPM | HEIGHT: 67 IN | BODY MASS INDEX: 38.6 KG/M2 | SYSTOLIC BLOOD PRESSURE: 118 MMHG

## 2020-01-16 DIAGNOSIS — G47.33 OSA (OBSTRUCTIVE SLEEP APNEA): Primary | ICD-10-CM

## 2020-01-16 PROCEDURE — 99205 OFFICE O/P NEW HI 60 MIN: CPT | Performed by: INTERNAL MEDICINE

## 2020-01-16 RX ORDER — SILDENAFIL 50 MG/1
50 TABLET, FILM COATED ORAL DAILY PRN
COMMUNITY
End: 2021-09-01

## 2020-01-16 ASSESSMENT — MIFFLIN-ST. JEOR: SCORE: 1939.03

## 2020-01-16 NOTE — PATIENT INSTRUCTIONS
"MY TREATMENT INFORMATION FOR SLEEP APNEA-  Raheem Jeong    DOCTOR : IRASEMA ORTIZ MD  SLEEP CENTER :      MY CONTACT NUMBER:       Am I having a home sleep study?  Watch this video:  [email or web address]/watch?v=CteI_[email or web address]&[email or web address]  Please verify your insurance coverage with your insurance carrier    Frequently asked questions:  1. What is Obstructive Sleep Apnea (ANTHONY)? ANTHONY is the most common type of sleep apnea. Apnea means, \"without breath.\"  Apnea is most often caused by narrowing or collapse of the upper airway as muscles relax during sleep.   Almost everyone has occasional apneas. Most people with sleep apnea have had brief interruptions at night frequently for many years.  The severity of sleep apnea is related to how frequent and severe the events are.   2. What are the consequences of ANTHONY? Symptoms include: feeling sleepy during the day, snoring loudly, gasping or stopping of breathing, trouble sleeping, and occasionally morning headaches or heartburn at night.  Sleepiness can be serious and even increase the risk of falling asleep while driving. Other health consequences may include development of high blood pressure and other cardiovascular disease in persons who are susceptible. Untreated ANTHONY  can contribute to heart disease, stroke and diabetes.   3. What are the treatment options? In most situations, sleep apnea is a lifelong disease that must be managed with daily therapy. Medications are not effective for sleep apnea and surgery is generally not considered until other therapies have been tried. Your treatment is your choice . Continuous Positive Airway (CPAP) works right away and is the therapy that is effective in nearly everyone. An oral device to hold your jaw forward is usually the next most reliable option. Other options include postioning devices (to keep you off your back), weight loss, and surgery including a tongue pacing device. There is more detail about " some of these options below.    Important tips for using CPAP and similar devices   Know your equipment:  CPAP is continuous positive airway pressure that prevents obstructive sleep apnea by keeping the throat from collapsing while you are sleeping. In most cases, the device is  smart  and can slowly self-adjusts if your throat collapses and keeps a record every day of how well you are treated-this information is available to you and your care team.  BPAP is bilevel positive airway pressure that keeps your throat open and also assists each breath with a pressure boost to maintain adequate breathing.  Special kinds of BPAP are used in patients who have inadequate breathing from lung or heart disease. In most cases, the device is  smart  and can slowly self-adjusts to assist breathing. Like CPAP, the device keeps a record of how well you are treated.  Your mask is your connection to the device. You get to choose what feels most comfortable and the staff will help to make sure if fits. Here: are some examples of the different masks that are available:       Key points to remember on your journey with sleep apnea:  1. Sleep study.  PAP devices often need to be adjusted during a sleep study to show that they are effective and adjusted right.  2. Good tips to remember: Try wearing just the mask during a quiet time during the day so your body adapts to wearing it. A humidifier is recommended for comfort in most cases to prevent drying of your nose and throat. Allergy medication from your provider may help you if you are having nasal congestion.  3. Getting settled-in. It takes more than one night for most of us to get used to wearing a mask. Try wearing just the mask during a quiet time during the day so your body adapts to wearing it. A humidifier is recommended for comfort in most cases. Our team will work with you carefully on the first day and will be in contact within 4 days and again at 2 and 4 weeks for advice and  remote device adjustments. Your therapy is evaluated by the device each day.   4. Use it every night. The more you are able to sleep naturally for 7-8 hours, the more likely you will have good sleep and to prevent health risks or symptoms from sleep apnea. Even if you use it 4 hours it helps. Occasionally all of us are unable to use a medical therapy, in sleep apnea, it is not dangerous to miss one night.   5. Communicate. Call our skilled team on the number provided on the first day if your visit for problems that make it difficult to wear the device. Over 2 out of 3 patients can learn to wear the device long-term with help from our team. Remember to call our team or your sleep providers if you are unable to wear the device as we may have other solutions for those who cannot adapt to mask CPAP therapy. It is recommended that you sleep your sleep provider within the first 3 months and yearly after that if you are not having problems.   6. Use it for your health. We encourage use of CPAP masks during daytime quiet periods to allow your face and brain to adapt to the sensation of CPAP so that it will be a more natural sensation to awaken to at night or during naps. This can be very useful during the first few weeks or months of adapting to CPAP though it does not help medically to wear CPAP during wakefulness and  should not be used as a strategy just to meet guidelines.  7. Take care of your equipment. Make sure you clean your mask and tubing using directions every day and that your filter and mask are replaced as recommended or if they are not working.     BESIDES CPAP, WHAT OTHER THERAPIES ARE THERE?    Positioning Device  Positioning devices are generally used when sleep apnea is mild and only occurs on your back.This example shows a pillow that straps around the waist. It may be appropriate for those whose sleep study shows milder sleep apnea that occurs primarily when lying flat on one's back. Preliminary  studies have shown benefit but effectiveness at home may need to be verified by a home sleep test. These devices are generally not covered by medical insurance.  Examples of devices that maintain sleeping on the back to prevent snoring and mild sleep apnea.    Belt type body positioner  Http://[employer]/    Electronic reminder  Http://nightshifttherapy.com/  Http://www.[employer].au/      Oral Appliance  What is oral appliance therapy?  An oral appliance device fits on your teeth at night like a retainer used after having braces. The device is made by a specialized dentist and requires several visits over 1-2 months before a manufactured device is made to fit your teeth and is adjusted to prevent your sleep apnea. Once an oral device is working properly, snoring should be improved. A home sleep test may be recommended at that time if to determine whether the sleep apnea is adequately treated.       Some things to remember:  -Oral devices are often, but not always, covered by your medical insurance. Be sure to check with your insurance provider.   -If you are referred for oral therapy, you will be given a list of specialized dentists to consider or you may choose to visit the Web site of the American Academy of Dental Sleep Medicine  -Oral devices are less likely to work if you have severe sleep apnea or are extremely overweight.     More detailed information  An oral appliance is a small acrylic device that fits over the upper and lower teeth  (similar to a retainer or a mouth guard). This device slightly moves jaw forward, which moves the base of the tongue forward, opens the airway, improves breathing for effective treat snoring and obstructive sleep apnea in perhaps 7 out of 10 people .  The best working devices are custom-made by a dental device  after a mold is made of the teeth 1, 2, 3.  When is an oral appliance indicated?  Oral appliance therapy is recommended as a first-line treatment for  patients with primary snoring, mild sleep apnea, and for patients with moderate sleep apnea who prefer appliance therapy to use of CPAP4, 5. Severity of sleep apnea is determined by sleep testing and is based on the number of respiratory events per hour of sleep.   How successful is oral appliance therapy?  The success rate of oral appliance therapy in patients with mild sleep apnea is 75-80% while in patients with moderate sleep apnea it is 50-70%. The chance of success in patients with severe sleep apnea is 40-50%. The research also shows that oral appliances have a beneficial effect on the cardiovascular health of ANTHONY patients at the same magnitude as CPAP therapy7.  Oral appliances should be a second-line treatment in cases of severe sleep apnea, but if not completely successful then a combination therapy utilizing CPAP plus oral appliance therapy may be effective. Oral appliances tend to be effective in a broad range of patients although studies show that the patients who have the highest success are females, younger patients, those with milder disease, and less severe obesity. 3, 6.   Finding a dentist that practices dental sleep medicine  Specific training is available through the American Academy of Dental Sleep Medicine for dentists interested in working in the field of sleep. To find a dentist who is educated in the field of sleep and the use of oral appliances, near you, visit the Web site of the American Academy of Dental Sleep Medicine.    References  1. Natanael et al. Objectively measured vs self-reported compliance during oral appliance therapy for sleep-disordered breathing. Chest 2013; 144(5): 2098-1176.  2. Loy et al. Objective measurement of compliance during oral appliance therapy for sleep-disordered breathing. Thorax 2013; 68(1): 91-96.  3. Manuel et al. Mandibular advancement devices in 620 men and women with ANTHONY and snoring: tolerability and predictors of treatment success.  Chest 2004; 125: 8846-8357.  4. Hanh et al. Oral appliances for snoring and ANTHONY: a review. Sleep 2006; 29: 244-262.  5. Mukul et al. Oral appliance treatment for ANTHONY: an update. J Clin Sleep Med 2014; 10(2): 215-227.  6. Juan et al. Predictors of OSAH treatment outcome. J Dent Res 2007; 86: 3821-8345.      Weight Loss:    Weight loss is a long-term strategy that may improve sleep apnea in some patients.    Weight management is a personal decision and the decision should be based on your interest and the potential benefits.  If you are interested in exploring weight loss strategies, the following discussion covers the impact on weight loss on sleep apnea and the approaches that may be successful.    Being overweight does not necessarily mean you will have health consequences.  Those who have BMI over 35 or over 27 with existing medical conditions carries greater risk.   Weight loss decreases severity of sleep apnea in most people with obesity. For those with mild obesity who have developed snoring with weight gain, even 15-30 pound weight loss can improve and occasionally eliminate sleep apnea.  Structured and life-long dietary and health habits are necessary to lose weight and keep healthier weight levels.     Though there may be significant health benefits from weight loss, long-term weight loss is very difficult to achieve- studies show success with dietary management in less than 10% of people. In addition, substantial weight loss may require years of dietary control and may be difficult if patients have severe obesity. In these cases, surgical management may be considered.  Finally, older individuals who have tolerated obesity without health complications may be less likely to benefit from weight loss strategies.        Your BMI is Body mass index is 38.51 kg/m .  Weight management is a personal decision.  If you are interested in exploring weight loss strategies, the following discussion covers  the approaches that may be successful. Body mass index (BMI) is one way to tell whether you are at a healthy weight, overweight, or obese. It measures your weight in relation to your height.  A BMI of 18.5 to 24.9 is in the healthy range. A person with a BMI of 25 to 29.9 is considered overweight, and someone with a BMI of 30 or greater is considered obese. More than two-thirds of American adults are considered overweight or obese.  Being overweight or obese increases the risk for further weight gain. Excess weight may lead to heart disease and diabetes.  Creating and following plans for healthy eating and physical activity may help you improve your health.  Weight control is part of healthy lifestyle and includes exercise, emotional health, and healthy eating habits. Careful eating habits lifelong are the mainstay of weight control. Though there are significant health benefits from weight loss, long-term weight loss with diet alone may be very difficult to achieve- studies show long-term success with dietary management in less than 10% of people. Attaining a healthy weight may be especially difficult to achieve in those with severe obesity. In some cases, medications, devices and surgical management might be considered.  What can you do?  If you are overweight or obese and are interested in methods for weight loss, you should discuss this with your provider.     Consider reducing daily calorie intake by 500 calories.     Keep a food journal.     Avoiding skipping meals, consider cutting portions instead.    Diet combined with exercise helps maintain muscle while optimizing fat loss. Strength training is particularly important for building and maintaining muscle mass. Exercise helps reduce stress, increase energy, and improves fitness. Increasing exercise without diet control, however, may not burn enough calories to loose weight.       Start walking three days a week 10-20 minutes at a time    Work towards walking  thirty minutes five days a week     Eventually, increase the speed of your walking for 1-2 minutes at time    In addition, we recommend that you review healthy lifestyles and methods for weight loss available through the National Institutes of Health patient information sites:  http://win.niddk.nih.gov/publications/index.htm    And look into health and wellness programs that may be available through your health insurance provider, employer, local community center, or [employer].    Weight management plan: Patient was referred to their PCP to discuss a diet and exercise plan.      Surgery:    Surgery for obstructive sleep apnea is considered generally only when other therapies fail to work. Surgery may be discussed with you if you are having a difficult time tolerating CPAP and or when there is an abnormal structure that requires surgical correction.  Nose and throat surgeries often enlarge the airway to prevent collapse.  Most of these surgeries create pain for 1-2 weeks and up to half of the most common surgeries are not effective throughout life.  You should carefully discuss the benefits and drawbacks to surgery with your sleep provider and surgeon to determine if it is the best solution for you.   More information  Surgery for ANTHONY is directed at areas that are responsible for narrowing or complete obstruction of the airway during sleep.  There are a wide range of procedures available to enlarge and/or stabilize the airway to prevent blockage of breathing in the three major areas where it can occur: the palate, tongue, and nasal regions.  Successful surgical treatment depends on the accurate identification of the factors responsible for obstructive sleep apnea in each person.  A personalized approach is required because there is no single treatment that works well for everyone.  Because of anatomic variation, consultation with an examination by a sleep surgeon is a critical first step in determining what surgical  options are best for each patient.  In some cases, examination during sedation may be recommended in order to guide the selection of procedures.  Patients will be counseled about risks and benefits as well as the typical recovery course after surgery. Surgery is typically not a cure for a person s ANTHONY.  However, surgery will often significantly improve one s ANTHONY severity (termed  success rate ).  Even in the absence of a cure, surgery will decrease the cardiovascular risk associated with OSA7; improve overall quality of life8 (sleepiness, functionality, sleep quality, etc).      Palate Procedures:  Patients with ANTHONY often have narrowing of their airway in the region of their tonsils and uvula.  The goals of palate procedures are to widen the airway in this region as well as to help the tissues resist collapse.  Modern palate procedure techniques focus on tissue conservation and soft tissue rearrangement, rather than tissue removal.  Often the uvula is preserved in this procedure. Residual sleep apnea is common in patient after pharyngoplasty with an average reduction in sleep apnea events of 33%2.      Tongue Procedures:  ExamWhile patients are awake, the muscles that surround the throat are active and keep this region open for breathing. These muscles relax during sleep, allowing the tongue and other structures to collapse and block breathing.  There are several different tongue procedures available.  Selection of a tongue base procedure depends on characteristics seen on physical exam.  Generally, procedures are aimed at removing bulky tissues in this area or preventing the back of the tongue from falling back during sleep.  Success rates for tongue surgery range from 50-62%3.    Hypoglossal Nerve Stimulation:  Hypoglossal nerve stimulation has recently received approval from the [place] Food and Drug Administration for the treatment of obstructive sleep apnea.  This is based on research showing that the  system was safe and effective in treating sleep apnea6.  Results showed that the median AHI score decreased 68%, from 29.3 to 9.0. This therapy uses an implant system that senses breathing patterns and delivers mild stimulation to airway muscles, which keeps the airway open during sleep.  The system consists of three fully implanted components: a small generator (similar in size to a pacemaker), a breathing sensor, and a stimulation lead.  Using a small handheld remote, a patient turns the therapy on before bed and off upon awakening.    Candidates for this device must be greater than 22 years of age, have moderate to severe ANTHONY (AHI between 20-65), BMI less than 32, have tried CPAP/oral appliance without success, and have appropriate upper airway anatomy (determined by a sleep endoscopy performed by Dr. Vazquez).    Hypoglossal Nerve Stimulation Pathway:    The sleep surgeon s office will work with the patient through the insurance prior-authorization process (including communications and appeals).    Nasal Procedures:  Nasal obstruction can interfere with nasal breathing during the day and night.  Studies have shown that relief of nasal obstruction can improve the ability of some patients to tolerate positive airway pressure therapy for obstructive sleep apnea1.  Treatment options include medications such as nasal saline, topical corticosteroid and antihistamine sprays, and oral medications such as antihistamines or decongestants. Non-surgical treatments can include external nasal dilators for selected patients. If these are not successful by themselves, surgery can improve the nasal airway either alone or in combination with these other options.      Combination Procedures:  Combination of surgical procedures and other treatments may be recommended, particularly if patients have more than one area of narrowing or persistent positional disease.  The success rate of combination surgery ranges from 66-80%2,3.     References  1. Vick DOWNEY. The Role of the Nose in Snoring and Obstructive Sleep Apnoea: An Update.  Eur Arch Otorhinolaryngol. 2011; 268: 1365-73.  2.  Francois SM; Venkatesh JA; Danita JR; Pallanch JF; Jcarlos MB; Phyllis SG; Johnie PERKINS. Surgical modifications of the upper airway for obstructive sleep apnea in adults: a systematic review and meta-analysis. SLEEP 2010;33(10):2521-8311. Eugenio LYLE. Hypopharyngeal surgery in obstructive sleep apnea: an evidence-based medicine review.  Arch Otolaryngol Head Neck Surg. 2006 Feb;132(2):206-13.  3. Amando YH1, Esteban Y, Ed VICENTE. The efficacy of anatomically based multilevel surgery for obstructive sleep apnea. Otolaryngol Head Neck Surg. 2003 Oct;129(4):327-35.  4. Eugenio LYLE, Goldberg A. Hypopharyngeal Surgery in Obstructive Sleep Apnea: An Evidence-Based Medicine Review. Arch Otolaryngol Head Neck Surg. 2006 Feb;132(2):206-13.  5. Roxana PJ et al. Upper-Airway Stimulation for Obstructive Sleep Apnea.  N Engl J Med. 2014 Jan 9;370(2):139-49.  6. Jacki Y et al. Increased Incidence of Cardiovascular Disease in Middle-aged Men with Obstructive Sleep Apnea. Am J Respir Crit Care Med; 2002 166: 159-165  7. Babin EM et al. Studying Life Effects and Effectiveness of Palatopharyngoplasty (SLEEP) study: Subjective Outcomes of Isolated Uvulopalatopharyngoplasty. Otolaryngol Head Neck Surg. 2011; 144: 623-631.          We would like to share some general information about sleep to help us work together to get better sleep for you.     Why do we sleep?   -clear toxins from the brain   -remove unnecessary neural connections that accumulate during the day   -enhance memory and learning   Without adequate sleep, our brain may become impaired in a manner that is similar to being intoxicated.       How much sleep do we need?   -The average adult needs 7-8 hours of sleep while young adolescents need up to 9 hours in order to function at their best.   -Between 12 and 20 years of age our sleep  "is often delayed up to an hour compared to older or younger people.   Aim for 7-8 sleep and a regular schedule; it may take a week or more to eliminate the effects of chronic insufficient sleep.       When should I sleep?   The timing of sleep is determined genetically for each of us.  Some of us are \"night owls\" and others are \"larks\".   The timing of sleep and the amount of sleep we need changes with our age.   Try to schedule your sleep time to fit your natural sleep schedule when you are not busy with school, work, or travel.       What if my sleep schedule doesn't fit my work schedule?   -If you have no trouble falling asleep or getting up during the work week, your work schedule is probably well-matched to your natural sleep schedule.   -You may benefit from a sleep  who can help support you to get to the best schedule.       What are some good habits to start with?   -Your bedroom is for sleeping and should be quiet, comfortably cool and dark before you lie down.   -You should not have electronic devices such as phones or computers in your bedroom.   -Your bedtime and awakening time should fit your natural tendency to fall asleep and wake up and should not vary much between weekdays and weekends.   -No caffeine within 6 hours or alcohol within two hours of bedtime  Be careful and regular with your sleep-you will feel better and think better.      Your BMI is Body mass index is 38.51 kg/m .  Weight management is a personal decision.  If you are interested in exploring weight loss strategies, the following discussion covers the approaches that may be successful. Body mass index (BMI) is one way to tell whether you are at a healthy weight, overweight, or obese. It measures your weight in relation to your height.  A BMI of 18.5 to 24.9 is in the healthy range. A person with a BMI of 25 to 29.9 is considered overweight, and someone with a BMI of 30 or greater is considered obese. More than two-thirds of " American adults are considered overweight or obese.  Being overweight or obese increases the risk for further weight gain. Excess weight may lead to heart disease and diabetes.  Creating and following plans for healthy eating and physical activity may help you improve your health.  Weight control is part of healthy lifestyle and includes exercise, emotional health, and healthy eating habits. Careful eating habits lifelong are the mainstay of weight control. Though there are significant health benefits from weight loss, long-term weight loss with diet alone may be very difficult to achieve- studies show long-term success with dietary management in less than 10% of people. Attaining a healthy weight may be especially difficult to achieve in those with severe obesity. In some cases, medications, devices and surgical management might be considered.  What can you do?  If you are overweight or obese and are interested in methods for weight loss, you should discuss this with your provider.     Consider reducing daily calorie intake by 500 calories.     Keep a food journal.     Avoiding skipping meals, consider cutting portions instead.    Diet combined with exercise helps maintain muscle while optimizing fat loss. Strength training is particularly important for building and maintaining muscle mass. Exercise helps reduce stress, increase energy, and improves fitness. Increasing exercise without diet control, however, may not burn enough calories to loose weight.       Start walking three days a week 10-20 minutes at a time    Work towards walking thirty minutes five days a week     Eventually, increase the speed of your walking for 1-2 minutes at time    In addition, we recommend that you review healthy lifestyles and methods for weight loss available through the National Institutes of Health patient information sites:  http://win.niddk.nih.gov/publications/index.htm    And look into health and wellness programs that may be  available through your health insurance provider, employer, local community center, or malvin club.    Weight management plan: Patient was referred to their PCP to discuss a diet and exercise plan.

## 2020-01-16 NOTE — PROGRESS NOTES
[facility]   Outpatient Sleep Medicine Consultation  January 16, 2020      Name: Raheem Jeong MRN# [id number]   Age: 49 year old YOB: 1970     Date of Consultation: January 16, 2020  Consultation is requested by: No referring provider defined for this encounter. No ref. provider found  Primary care provider: Shante [facility]         Reason for Sleep Consult:     Raheem Jeong is a 49 year old male who wishes to re-establish care for obstructive sleep apnea         Assessment and Plan:     Summary Sleep Diagnoses:      Clinical signs and symptoms of obstructive sleep apnea    Shiftwork with chronically insufficient sleep and substantial social jet lag    Probably underestimated daytime sleepiness with inappropriate nodding    Mood disturbance    Obesity        Summary Counseling: We have discussed re-adaptation to CPAP and alternative therapies for sleep apnea including mandibular advancement device which should be evaluated in the context of his temporomandibular joint clicking.  We discussed potential risks of untreated sleep apnea including cardiovascular disease; in addition we discussed health risk related to shift work and chronically insufficient sleep.  We reviewed together the video of home sleep testing.           History of Present Illness:     Raheem Jeong is a 49 year old male with a 20 year history of obstructive sleep apnea with inability to tolerate CPAP.  After his original diagnosis he attempted to use CPAP until the past few years when he discontinued because of sensation of suffocation from perceived high pressures and mask intolerance.  He does not have claustrophobia.  We do not have documentation of his remote sleep apnea severity.  This gentleman has noted variations in severity dependent on his body weight which is fluctuated between 190 pounds and his current peak lifetime weight of 245 pounds.  He also is concerned that his tonsillar  enlargement may be the primary cause for the sleep apnea.  He is requesting a review of alternative therapies for management of his condition and is not willing to initiate CPAP.        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 averaging 5 hours of sleep per night on third shift work schedule and catch up on weekends with 9 hours of nighttime sleep between 10 and 6 AM an additional 1 hour nap time 1 or 2 weekend nights    BEDTIME SCHEDULE S [RED] AND SHIFT WORK W [GREEN]:      Shift Schedule                            MONDAY        or M-F 8 9 10 11 12am 1 2 3 4 5 6 7 8 9 10 11 12pm 1 2 3 4 5 6 7 HRS     S S S S S S S       W W W W W W W W W  0   Saturday-Sunday 8 9 10 11 12am 1 2 3 4 5 6 7 8 9 10 11 12pm 1 2 3 4 5 6 7 HRS                  S S S S S S S S S  0   W= work   S=sleep     SCALES       SLEEP APNEA: Stopbang score  4       INSOMNIA:  Insomnia severity score: 10       SLEEPINESS: Fifield sleepiness scale: 7 [normal < 11]   Drowsy driving/near accidents denies drowsy driving however acknowledges nodding occasionally while standing at work    SLEEP COMPLAINTS:  Cardio-respiratory    Snoring- 7/week generally worse supine without clear-cut apneas  Dyspnea- denies  Morning headaches or confusion-denies  Coexisting Lung disease: denies    Coexisting Heart disease: denies    Does patient have a bed partner: denies  Has bed partner been sleeping separately because of snoring: On nights when he and his wife are sleeping together she is bothered by recurrent awakenings from his snoring requiring him to poke him to move to his side            RLS Screen: When you try to relax in the evening or sleep at  night, do you ever have unpleasant, restless feelings in your  legs that can be relieved by walking or movement? denies    Periodic limb movement: denies    Narcolepsy:  denies sudden urges of sleep attacks    Cataplexy:  denies     Sleep paralysis:  denies      Hallucinations:    denies       Sleep Behaviors:    Leg symptoms/movements: denies    Motor restlessness:denies    Night terrors: denies    Bruxism: denies    Automatic behaviors: none    Other subjective complaints:    Anxiety or rumination denies    Pain and discomfort at  night: denies    Waking up with heart pounding or racing: denies    GERD or aspiration:denies         Parasomnia:   NREM - denies recurrent persistent confusional arousal, night eating, sleep walking or sleep terrors   REM  - denies dream enactment; injuries            Medications:     Current Outpatient Medications   Medication Sig     loratadine (CLARITIN) 10 MG tablet Take 10 mg by mouth daily     multivitamin, therapeutic with minerals (MULTI-VITAMIN) TABS tablet Take 1 tablet by mouth daily     sildenafil (VIAGRA) 50 MG tablet Take 50 mg by mouth daily as needed     No current facility-administered medications for this visit.         No Known Allergies         Past Medical History:     Does not need 02 supplement at night   Past Medical History:   Diagnosis Date     CARDIOVASCULAR SCREENING; LDL GOAL LESS THAN 160 11/4/2011     Sleep apnea              Past Surgical History:    Previous upper airway surgery    Past Surgical History:   Procedure Laterality Date     HERNIA REPAIR  1988            Social History:     Social History     Tobacco Use     Smoking status: Former Smoker     Smokeless tobacco: Former User   Substance Use Topics     Alcohol use: Yes     Comment: twice/week       Prior history of smoking and alcohol use disorder currently non-smoker  Prior history of excessive caffeine intake         Family History:     No family history on file.     Sleep Family Hx:           Review of Systems:     A complete 10 point review of systems was negative other than HPI or as commented below:   Joint pain status post knuckle replacement  Muscle pain  No edema although occasionally joint swelling  Pression and anxiety           Physical Examination:     [place]  Total Score 1/16/2020   Total score - [place] 7       Constitutional: . Awake, alert, cooperative, dressed casually, good eye contact, comfortably sitting in a chair, in no apparent distress  Mood: euthymic; affect congruent with full range and intensity.  Attention/Concentration:  Normal   Eyes: No icterus.  ENT: Mallampati Class:III tonsillar enlargement close to midline  Cardiovascular: Regular S1 and S2, no gallops or murmurs. No carotid bruits  Neck: Supple, no thyroid enlargement.   Pulmonary:  Chest symmetric, lungs clear bilaterally and no crackles, wheezes or rales  Extremities:  No pedal edema.  Muscle/joint: Strength and tone normal   Skin:  No rash or significant lesions.   Gait Normal.  Neurologic: Alert, oriented x3, no focal neurological deficit, cranial nerves grossly normal            Data: All pertinent previous laboratory data reviewed     No results found for: PH, PHARTERIAL, PO2, [id number], SAT, PCO2, HCO3, BASEEXCESS, MARCELA, BEB  No results found for: TSH  Lab Results   Component Value Date     (H) 11/04/2011     No results found for: HGB  Lab Results   Component Value Date    BUN 12 11/04/2011    CR 0.84 11/04/2011     Lab Results   Component Value Date    AST 36 11/04/2011    ALT 26 11/04/2011    ALKPHOS 66 11/04/2011    BILITOTAL 0.6 11/04/2011     No results found for: UAMP, UBARB, BENZODIAZEUR, UCANN, UCOC, OPIT, UPCP        Copy to: [facility]      IRASEMA ORTIZ MD 1/16/2020     Total time spent with patient: 60  min >50% counseling

## 2020-01-25 ENCOUNTER — COMMUNICATION - HEALTHEAST (OUTPATIENT)
Dept: FAMILY MEDICINE | Facility: CLINIC | Age: 50
End: 2020-01-25

## 2020-01-25 DIAGNOSIS — N52.9 PRIMARY ERECTILE DYSFUNCTION: ICD-10-CM

## 2020-01-27 ENCOUNTER — TELEPHONE (OUTPATIENT)
Dept: SLEEP MEDICINE | Facility: CLINIC | Age: 50
End: 2020-01-27

## 2020-01-27 NOTE — TELEPHONE ENCOUNTER
Patient called today.    Patient needs to schedule  Sleep Center HST Pickup and HST dropoff appointments.    He would like to schedule for Wednesday, Janiuary 29, 2020 at 11:00 AM for pickup.    He would like to schedule for Thursday, January 30, 2020 at 11:00 AM for drop off.    Please contact patient today.    Okay to leave a detailed message at this number.    Thank you.    Central Scheduling  Juliet SHEPHERD

## 2020-01-27 NOTE — TELEPHONE ENCOUNTER
Pt called and message left informing pt that we don't have any available appointments on 1/29 for HST .  Pt asked to call back to schedule a different time for .    BÁRBARA Akers

## 2020-01-28 ENCOUNTER — RECORDS - HEALTHEAST (OUTPATIENT)
Dept: ADMINISTRATIVE | Facility: OTHER | Age: 50
End: 2020-01-28

## 2020-02-04 ENCOUNTER — OFFICE VISIT (OUTPATIENT)
Dept: SLEEP MEDICINE | Facility: CLINIC | Age: 50
End: 2020-02-04
Payer: COMMERCIAL

## 2020-02-04 DIAGNOSIS — G47.33 OSA (OBSTRUCTIVE SLEEP APNEA): ICD-10-CM

## 2020-02-04 PROCEDURE — G0399 HOME SLEEP TEST/TYPE 3 PORTA: HCPCS | Performed by: INTERNAL MEDICINE

## 2020-02-04 NOTE — PROGRESS NOTES
Pt is completing a home sleep test. Pt was instructed on how to put on the Noxturnal T3 device and associated equipment before going to bed and given the opportunity to practice putting it on before leaving the sleep center. Pt was reminded to bring the home sleep test kit back to the center tomorrow, at agreed upon time for download and reporting.       Maira Albarado MA on 2/4/2020 at 1:41 PM

## 2020-02-05 ENCOUNTER — APPOINTMENT (OUTPATIENT)
Dept: SLEEP MEDICINE | Facility: CLINIC | Age: 50
End: 2020-02-05
Payer: COMMERCIAL

## 2020-02-05 NOTE — PROGRESS NOTES
This HSAT was performed using a Noxturnal T3 device which recorded snore, sound, movement activity, body position, nasal pressure, oronasal thermal airflow, pulse, oximetry and both chest and abdominal respiratory effort. HSAT data was restricted to the time patient states they were in bed.     HSAT was scored using 1B 4% hypopnea rule.     HST AHI (Non-PAT): 13.4  Snoring was reported as mild.  Time with SpO2 below 89% was 0.8 minutes.   Overall signal quality was good     Pt will follow up with sleep provider to determine appropriate therapy.

## 2020-02-05 NOTE — PROGRESS NOTES
Pt returned HST device. It was downloaded and forwarded data to the clinical specialist for scoring.    Maira Albarado MA on 2/5/2020 at 11:49 AM

## 2020-02-11 ENCOUNTER — OFFICE VISIT - HEALTHEAST (OUTPATIENT)
Dept: FAMILY MEDICINE | Facility: CLINIC | Age: 50
End: 2020-02-11

## 2020-02-11 DIAGNOSIS — J10.1 INFLUENZA A: ICD-10-CM

## 2020-02-11 DIAGNOSIS — R05.9 COUGH: ICD-10-CM

## 2020-02-11 LAB
FLUAV AG SPEC QL IA: ABNORMAL
FLUBV AG SPEC QL IA: ABNORMAL

## 2020-02-13 ENCOUNTER — MYC MEDICAL ADVICE (OUTPATIENT)
Dept: SLEEP MEDICINE | Facility: CLINIC | Age: 50
End: 2020-02-13

## 2020-02-13 DIAGNOSIS — G47.33 OSA (OBSTRUCTIVE SLEEP APNEA): Primary | ICD-10-CM

## 2020-02-13 NOTE — PROCEDURES
"HOME SLEEP STUDY INTERPRETATION    Patient: Raheem Jeong  MRN: [id number]  YOB: 1970  Study Date: 2/4/2020  Referring Provider: Shante, [facility];   Ordering Provider: IRASEMA ORTIZ MD     Indications for Home Study: Raheem Jeong is a 49 year old male with a history of obstructive sleep apnea and CPAP intolerance who presents with symptoms suggestive of obstructive sleep apnea.    Estimated body mass index is 38.51 kg/m  as calculated from the following:    Height as of 1/16/20: 1.702 m (5' 7\").    Weight as of 1/16/20: 111.5 kg (245 lb 14.4 oz).  Total score - [place]: 7 (1/16/2020  9:18 AM)      Data: A full night home sleep study was performed recording the standard physiologic parameters including body position, movement, sound, nasal pressure, thermal oral airflow, chest and abdominal movements with respiratory inductance plethysmography, and oxygen saturation by pulse oximetry. Pulse rate was estimated by oximetry recording. This study was considered adequate based on > 4 hours of quality oximetry and respiratory recording. As specified by the AASM Manual for the Scoring of Sleep and Associated events, version 2.3, Rule VIII.D 1B, 4% oxygen desaturation scoring for hypopneas is used as a standard of care on all home sleep apnea testing.    Analysis Time:  418 minutes    Respiration:   Sleep Associated Hypoxemia: sustained hypoxemia was not present. Baseline oxygen saturation was 94%.  Time with saturation less than or equal to 88% was 0.8 minutes. The lowest oxygen saturation was 86%.   Snoring: Snoring was present.  Respiratory events: The home study revealed a presence of 48 obstructive apneas and 13 mixed and central apneas. There were 32 hypopneas resulting in a combined apnea/hypopnea index [AHI] of 13.4 events per hour.  AHI was 5 per hour prone, 34 per hour on left side, and 37 per hour on right side with insufficient time supine for evaluation of positional effect.   Pattern: " Excluding events noted above, respiratory rate and pattern was Normal.    Position: Percent of time spent: supine - 0 %, prone - 71%, on left - 12%, on right - 16%.    Heart Rate: By pulse oximetry normal rate was noted.     Assessment:   Mild obstructive sleep apnea.  Sleep associated hypoxemia was not present.    Recommendations:  Consider oral appliance therapy.  Suggest optimizing sleep hygiene and avoiding sleep deprivation.  Weight management.    Diagnosis Code(s): Obstructive Sleep Apnea G47.33    IRASEMA ORTIZ MD, February 13, 2020   Diplomate, American Board of Internal Medicine, Sleep Medicine

## 2020-02-25 ENCOUNTER — RECORDS - HEALTHEAST (OUTPATIENT)
Dept: ADMINISTRATIVE | Facility: OTHER | Age: 50
End: 2020-02-25

## 2020-03-02 ENCOUNTER — TRANSFERRED RECORDS (OUTPATIENT)
Dept: HEALTH INFORMATION MANAGEMENT | Facility: CLINIC | Age: 50
End: 2020-03-02

## 2020-03-05 ENCOUNTER — COMMUNICATION - HEALTHEAST (OUTPATIENT)
Dept: FAMILY MEDICINE | Facility: CLINIC | Age: 50
End: 2020-03-05

## 2020-03-05 DIAGNOSIS — T75.3XXA MOTION SICKNESS: ICD-10-CM

## 2020-03-06 ENCOUNTER — RECORDS - HEALTHEAST (OUTPATIENT)
Dept: ADMINISTRATIVE | Facility: OTHER | Age: 50
End: 2020-03-06

## 2020-03-15 ENCOUNTER — HEALTH MAINTENANCE LETTER (OUTPATIENT)
Age: 50
End: 2020-03-15

## 2020-03-23 ENCOUNTER — COMMUNICATION - HEALTHEAST (OUTPATIENT)
Dept: FAMILY MEDICINE | Facility: CLINIC | Age: 50
End: 2020-03-23

## 2020-03-23 DIAGNOSIS — J31.0 CHRONIC RHINITIS: ICD-10-CM

## 2020-05-29 ENCOUNTER — RECORDS - HEALTHEAST (OUTPATIENT)
Dept: ADMINISTRATIVE | Facility: OTHER | Age: 50
End: 2020-05-29

## 2020-06-19 ENCOUNTER — COMMUNICATION - HEALTHEAST (OUTPATIENT)
Dept: FAMILY MEDICINE | Facility: CLINIC | Age: 50
End: 2020-06-19

## 2020-06-19 DIAGNOSIS — J31.0 CHRONIC RHINITIS: ICD-10-CM

## 2020-08-21 ENCOUNTER — RECORDS - HEALTHEAST (OUTPATIENT)
Dept: ADMINISTRATIVE | Facility: OTHER | Age: 50
End: 2020-08-21

## 2020-10-30 ENCOUNTER — COMMUNICATION - HEALTHEAST (OUTPATIENT)
Dept: FAMILY MEDICINE | Facility: CLINIC | Age: 50
End: 2020-10-30

## 2020-10-30 ENCOUNTER — AMBULATORY - HEALTHEAST (OUTPATIENT)
Dept: FAMILY MEDICINE | Facility: CLINIC | Age: 50
End: 2020-10-30

## 2020-10-30 DIAGNOSIS — Z86.19 H/O VIRAL ILLNESS: ICD-10-CM

## 2020-11-02 ENCOUNTER — AMBULATORY - HEALTHEAST (OUTPATIENT)
Dept: LAB | Facility: CLINIC | Age: 50
End: 2020-11-02

## 2020-11-02 DIAGNOSIS — Z86.19 H/O VIRAL ILLNESS: ICD-10-CM

## 2020-11-07 ENCOUNTER — COMMUNICATION - HEALTHEAST (OUTPATIENT)
Dept: SCHEDULING | Facility: CLINIC | Age: 50
End: 2020-11-07

## 2020-11-08 ENCOUNTER — VIRTUAL VISIT (OUTPATIENT)
Dept: FAMILY MEDICINE | Facility: OTHER | Age: 50
End: 2020-11-08
Payer: COMMERCIAL

## 2020-11-08 ENCOUNTER — COMMUNICATION - HEALTHEAST (OUTPATIENT)
Dept: FAMILY MEDICINE | Facility: CLINIC | Age: 50
End: 2020-11-08

## 2020-11-08 PROCEDURE — 99421 OL DIG E/M SVC 5-10 MIN: CPT | Performed by: FAMILY MEDICINE

## 2020-11-08 NOTE — PROGRESS NOTES
"Date: 2020 11:09:29  Clinician: Min Hester  Clinician NPI: [id number]  Patient: Raheem Jeong  Patient : 1970  Patient Address: [address] [place] [address] [address], [place] [address]  Patient Phone: [phone]  Visit Protocol: URI  Patient Summary:  Raheem is a 50 year old ( : 1970 ) male who initiated a [facility] Visit for COVID-19 (Coronavirus) evaluation and screening. When asked the question \"Please sign me up to receive news, health information and promotions from [facility].\", Raheem responded \"No\".    Raheem states his symptoms started 1-2 days ago.   His symptoms consist of rhinitis, myalgia, chills, malaise, a headache, a cough, nasal congestion, and nausea. He is experiencing mild difficulty breathing with activities but can speak normally in full sentences. Raheem also feels feverish.   Symptom details     Nasal secretions: The color of his mucus is white.    Cough: Raheem coughs a few times an hour and his cough is not more bothersome at night. Phlegm comes into his throat when he coughs. He does not believe his cough is caused by post-nasal drip. The color of the phlegm is clear.     Temperature: His current temperature is 99.5 degrees Fahrenheit.     Headache: He states the headache is mild (1-3 on a 10 point pain scale).      Raheem denies having vomiting, facial pain or pressure, sore throat, teeth pain, ageusia, diarrhea, ear pain, wheezing, and anosmia. He also denies taking antibiotic medication in the past month, having recent facial or sinus surgery in the past 60 days, and having a sinus infection within the past year.   Precipitating events  He has not recently been exposed to someone with influenza. Raheem has been in close contact with the following high risk individuals: people with asthma, heart disease or diabetes.   Pertinent COVID-19 (Coronavirus) information  Raheem does not work or volunteer as healthcare worker or a . In the past 14 days, Raheem has not " worked or volunteered at a healthcare facility or group living setting.   In the past 14 days, he also has not lived in a congregate living setting.   Raheem has had a close contact with a laboratory-confirmed COVID-19 patient within 14 days of symptom onset. He was exposed at his work. Date Raheem was exposed to the laboratory-confirmed COVID-19 patient: 11/06/2020   Additional information about contact with COVID-19 (Coronavirus) patient as reported by the patient (free text): Several co-workers tested positive    Since December 2019, Raheem has not been tested for COVID-19 and has had upper respiratory infection (URI) or influenza-like illness.      Date(s) of previous URI or influenza-like illness (free-text): Feb 2020     Symptoms Raheem experienced during previous URI or influenza-like illness as reported by the patient (free-text): Fever,cough, aches,chills        Pertinent medical history  Raheem needs a return to work/school note.   Weight: 250 lbs   Raheem does not smoke or use smokeless tobacco.   Weight: 250 lbs    MEDICATIONS: loratadine-pseudoephedrine oral, ALLERGIES: NKDA  Clinician Response:  Dear Raheem,   Your symptoms show that you may have coronavirus (COVID-19). This illness can cause fever, cough and trouble breathing. Many people get a mild case and get better on their own. Some people can get very sick.  What should I do?  We would like to test you for this virus.   1. Please call [phone] to schedule your visit. Explain that you were referred by [facility] to have a COVID-19 test. Be ready to share your [facility] visit ID number.  * If you need to schedule in [facility] please call [phone] or for [employer] employees please call [phone].  * If you need to schedule in the [place] area please call [phone]. [place] employees call [phone].  The following will serve as your written order for this COVID Test, ordered by me, for the indication of suspected COVID [Z20.828]: The  "test will be ordered in [employer], our electronic health record, after you are scheduled. It will show as ordered and authorized by Ronnell Ward MD.  Order: COVID-19 (Coronavirus) PCR for SYMPTOMATIC testing from [facility].   2. When it's time for your COVID test:  Stay at least 6 feet away from others. (If someone will drive you to your test, stay in the backseat, as far away from the  as you can.)   Cover your mouth and nose with a mask, tissue or washcloth.  Go straight to the testing site. Don't make any stops on the way there or back.      3.Starting now: Stay home and away from others (self-isolate) until:   You've had no fever---and no medicine that reduces fever---for one full day (24 hours). And...   Your other symptoms have gotten better. For example, your cough or breathing has improved. And...   At least 10 days have passed since your symptoms started.       During this time, don't leave the house except for testing or medical care.   Stay in your own room, even for meals. Use your own bathroom if you can.   Stay away from others in your home. No hugging, kissing or shaking hands. No visitors.  Don't go to work, school or anywhere else.    Clean \"high touch\" surfaces often (doorknobs, counters, handles, etc.). Use a household cleaning spray or wipes. You'll find a full list of  on the EPA website: www.epa.gov/pesticide-registration/list-n-disinfectants-use-against-sars-cov-2.   Cover your mouth and nose with a mask, tissue or washcloth to avoid spreading germs.  Wash your hands and face often. Use soap and water.  Caregivers in these groups are at risk for severe illness due to COVID-19:  o People 65 years and older  o People who live in a nursing home or long-term care facility  o People with chronic disease (lung, heart, cancer, diabetes, kidney, liver, immunologic)  o People who have a weakened immune system, including those who:   Are in cancer treatment  Take medicine that weakens the immune " system, such as corticosteroids  Had a bone marrow or organ transplant  Have an immune deficiency  Have poorly controlled HIV or AIDS  Are obese (body mass index of 40 or higher)  Smoke regularly   o Caregivers should wear gloves while washing dishes, handling laundry and cleaning bedrooms and bathrooms.  o Use caution when washing and drying laundry: Don't shake dirty laundry, and use the warmest water setting that you can.  o For more tips, go to www.cdc.gov/coronavirus/2019-ncov/downloads/10Things.pdf.    4.Sign up for [email or web address]. We know it's scary to hear that you might have COVID-19. We want to track your symptoms to make sure you're okay over the next 2 weeks. Please look for an email from [email or web address]---this is a free, online program that we'll use to keep in touch. To sign up, follow the link in the email. Learn more at [email or web address]  How can I take care of myself?   Get lots of rest. Drink extra fluids (unless a doctor has told you not to).   Take Tylenol (acetaminophen) for fever or pain. If you have liver or kidney problems, ask your family doctor if it's okay to take Tylenol.   Adults can take either:    650 mg (two 325 mg pills) every 4 to 6 hours, or...   1,000 mg (two 500 mg pills) every 8 hours as needed.    Note: Don't take more than 3,000 mg in one day. Acetaminophen is found in many medicines (both prescribed and over-the-counter medicines). Read all labels to be sure you don't take too much.   For children, check the Tylenol bottle for the right dose. The dose is based on the child's age or weight.    If you have other health problems (like cancer, heart failure, an organ transplant or severe kidney disease): Call your specialty clinic if you don't feel better in the next 2 days.       Know when to call 911. Emergency warning signs include:    Trouble breathing or shortness of breath Pain or pressure in the chest that doesn't go away Feeling confused like you haven't felt  before, or not being able to wake up Bluish-colored lips or face.  Where can I get more information?   [facility] -- About COVID-19: [email or web address]   CDC -- What to Do If You're Sick: www.cdc.gov/coronavirus/2019-ncov/about/steps-when-sick.html   [facility] -- Ending Home Isolation: www.cdc.gov/coronavirus/2019-ncov/hcp/disposition-in-home-patients.html   [facility] -- Caring for Someone: www.cdc.gov/coronavirus/2019-ncov/if-you-are-sick/care-for-someone.html   [facility] -- Interim Guidance for Hospital Discharge to Home: www.[facility].[place]/diseases/coronavirus/hcp/hospdischarge.pdf   [facility] clinical trials (COVID-19 research studies): clinicalaffairs.[facility]-clinical-trials    Below are the COVID-19 hotlines at the [facility] ([facility]). Interpreters are available.    For health questions: Call 886-628-4012 or 1-928.294.5746 (7 a.m. to 7 p.m.) For questions about schools and childcare: Call 546-781-4256 or 1-312.698.5872 (7 a.m. to 7 p.m.)    Diagnosis: Contact with and (suspected) exposure to other viral communicable diseases  Diagnosis ICD: Z20.828

## 2020-11-09 ENCOUNTER — AMBULATORY - HEALTHEAST (OUTPATIENT)
Dept: FAMILY MEDICINE | Facility: CLINIC | Age: 50
End: 2020-11-09

## 2020-11-09 DIAGNOSIS — R05.9 COUGH: ICD-10-CM

## 2020-11-17 ENCOUNTER — COMMUNICATION - HEALTHEAST (OUTPATIENT)
Dept: FAMILY MEDICINE | Facility: CLINIC | Age: 50
End: 2020-11-17

## 2020-12-16 ENCOUNTER — OFFICE VISIT - HEALTHEAST (OUTPATIENT)
Dept: SURGERY | Facility: CLINIC | Age: 50
End: 2020-12-16

## 2020-12-16 ENCOUNTER — COMMUNICATION - HEALTHEAST (OUTPATIENT)
Dept: FAMILY MEDICINE | Facility: CLINIC | Age: 50
End: 2020-12-16

## 2020-12-16 DIAGNOSIS — G47.33 OSA (OBSTRUCTIVE SLEEP APNEA): ICD-10-CM

## 2020-12-16 DIAGNOSIS — E66.812 OBESITY, CLASS II, BMI 35-39.9: ICD-10-CM

## 2020-12-16 DIAGNOSIS — J31.0 CHRONIC RHINITIS: ICD-10-CM

## 2020-12-16 DIAGNOSIS — E66.01 MORBID OBESITY (H): ICD-10-CM

## 2020-12-16 ASSESSMENT — MIFFLIN-ST. JEOR: SCORE: 1943.09

## 2020-12-22 ENCOUNTER — OFFICE VISIT - HEALTHEAST (OUTPATIENT)
Dept: SURGERY | Facility: CLINIC | Age: 50
End: 2020-12-22

## 2020-12-22 ENCOUNTER — COMMUNICATION - HEALTHEAST (OUTPATIENT)
Dept: SURGERY | Facility: CLINIC | Age: 50
End: 2020-12-22

## 2020-12-22 DIAGNOSIS — Z71.3 NUTRITIONAL COUNSELING: ICD-10-CM

## 2020-12-22 DIAGNOSIS — E66.812 OBESITY, CLASS II, BMI 35-39.9, ISOLATED (SEE ACTUAL BMI): ICD-10-CM

## 2021-01-09 ENCOUNTER — HEALTH MAINTENANCE LETTER (OUTPATIENT)
Age: 51
End: 2021-01-09

## 2021-01-18 ENCOUNTER — OFFICE VISIT - HEALTHEAST (OUTPATIENT)
Dept: SURGERY | Facility: CLINIC | Age: 51
End: 2021-01-18

## 2021-01-18 DIAGNOSIS — Z86.69 HX OF SLEEP APNEA: ICD-10-CM

## 2021-01-18 DIAGNOSIS — E66.01 MORBID OBESITY (H): ICD-10-CM

## 2021-01-19 ENCOUNTER — TELEPHONE (OUTPATIENT)
Dept: SLEEP MEDICINE | Facility: CLINIC | Age: 51
End: 2021-01-19

## 2021-01-19 NOTE — TELEPHONE ENCOUNTER
Reason for call:  Other   Patient called regarding (reason for call): Gracie with [facility] is calling to obtain a copy of the patient's most recent sleep study. Please fax to [phone]  Additional comments: N/a    Phone number to reach patient:  Home number on file [phone] phone number for Gracie at [facility], ok to leave voicemail.     Best Time:  Any time    Can we leave a detailed message on this number?  YES    Travel screening: Not Applicable

## 2021-01-20 ENCOUNTER — COMMUNICATION - HEALTHEAST (OUTPATIENT)
Dept: SURGERY | Facility: CLINIC | Age: 51
End: 2021-01-20

## 2021-01-20 ENCOUNTER — AMBULATORY - HEALTHEAST (OUTPATIENT)
Dept: LAB | Facility: HOSPITAL | Age: 51
End: 2021-01-20

## 2021-01-20 DIAGNOSIS — E66.812 OBESITY, CLASS II, BMI 35-39.9: ICD-10-CM

## 2021-01-20 LAB
25(OH)D3 SERPL-MCNC: 39.5 NG/ML (ref 30–80)
ALBUMIN SERPL-MCNC: 4.1 G/DL (ref 3.5–5)
ALP SERPL-CCNC: 53 U/L (ref 45–120)
ALT SERPL W P-5'-P-CCNC: 18 U/L (ref 0–45)
ANION GAP SERPL CALCULATED.3IONS-SCNC: 8 MMOL/L (ref 5–18)
AST SERPL W P-5'-P-CCNC: 19 U/L (ref 0–40)
BILIRUB SERPL-MCNC: 0.5 MG/DL (ref 0–1)
BUN SERPL-MCNC: 15 MG/DL (ref 8–22)
CALCIUM SERPL-MCNC: 9.5 MG/DL (ref 8.5–10.5)
CHLORIDE BLD-SCNC: 105 MMOL/L (ref 98–107)
CO2 SERPL-SCNC: 26 MMOL/L (ref 22–31)
CREAT SERPL-MCNC: 0.83 MG/DL (ref 0.7–1.3)
ERYTHROCYTE [DISTWIDTH] IN BLOOD BY AUTOMATED COUNT: 12.4 % (ref 11–14.5)
GFR SERPL CREATININE-BSD FRML MDRD: >60 ML/MIN/1.73M2
GLUCOSE BLD-MCNC: 106 MG/DL (ref 70–125)
HBA1C MFR BLD: 5.3 %
HCT VFR BLD AUTO: 41.8 % (ref 40–54)
HGB BLD-MCNC: 14.5 G/DL (ref 14–18)
MCH RBC QN AUTO: 30.4 PG (ref 27–34)
MCHC RBC AUTO-ENTMCNC: 34.7 G/DL (ref 32–36)
MCV RBC AUTO: 88 FL (ref 80–100)
PLATELET # BLD AUTO: 261 THOU/UL (ref 140–440)
PMV BLD AUTO: 9.9 FL (ref 8.5–12.5)
POTASSIUM BLD-SCNC: 3.7 MMOL/L (ref 3.5–5)
PROT SERPL-MCNC: 7.5 G/DL (ref 6–8)
PTH-INTACT SERPL-MCNC: 48 PG/ML (ref 10–86)
RBC # BLD AUTO: 4.77 MILL/UL (ref 4.4–6.2)
SODIUM SERPL-SCNC: 139 MMOL/L (ref 136–145)
TSH SERPL DL<=0.005 MIU/L-ACNC: 0.33 UIU/ML (ref 0.3–5)
VIT B12 SERPL-MCNC: 355 PG/ML (ref 213–816)
WBC: 6.3 THOU/UL (ref 4–11)

## 2021-03-01 ENCOUNTER — COMMUNICATION - HEALTHEAST (OUTPATIENT)
Dept: SURGERY | Facility: CLINIC | Age: 51
End: 2021-03-01

## 2021-03-01 ENCOUNTER — OFFICE VISIT - HEALTHEAST (OUTPATIENT)
Dept: SURGERY | Facility: CLINIC | Age: 51
End: 2021-03-01

## 2021-03-01 DIAGNOSIS — G47.30 MILD SLEEP APNEA: ICD-10-CM

## 2021-03-01 DIAGNOSIS — E66.812 OBESITY, CLASS II, BMI 35-39.9: ICD-10-CM

## 2021-03-10 ENCOUNTER — RECORDS - HEALTHEAST (OUTPATIENT)
Dept: ADMINISTRATIVE | Facility: OTHER | Age: 51
End: 2021-03-10

## 2021-03-27 ENCOUNTER — COMMUNICATION - HEALTHEAST (OUTPATIENT)
Dept: FAMILY MEDICINE | Facility: CLINIC | Age: 51
End: 2021-03-27

## 2021-03-27 DIAGNOSIS — N52.9 PRIMARY ERECTILE DYSFUNCTION: ICD-10-CM

## 2021-04-26 ENCOUNTER — OFFICE VISIT - HEALTHEAST (OUTPATIENT)
Dept: SURGERY | Facility: CLINIC | Age: 51
End: 2021-04-26

## 2021-04-26 DIAGNOSIS — Z80.0 FAMILY HISTORY OF COLON CANCER: ICD-10-CM

## 2021-04-26 DIAGNOSIS — Z12.11 COLON CANCER SCREENING: ICD-10-CM

## 2021-04-26 DIAGNOSIS — G47.30 MILD SLEEP APNEA: ICD-10-CM

## 2021-04-26 DIAGNOSIS — E66.812 OBESITY, CLASS II, BMI 35-39.9: ICD-10-CM

## 2021-04-28 ENCOUNTER — SURGERY - HEALTHEAST (OUTPATIENT)
Dept: SURGERY | Facility: CLINIC | Age: 51
End: 2021-04-28

## 2021-04-28 DIAGNOSIS — Z80.0 FAMILY HISTORY OF COLON CANCER: ICD-10-CM

## 2021-04-28 DIAGNOSIS — Z12.11 ENCOUNTER FOR SCREENING COLONOSCOPY: ICD-10-CM

## 2021-04-28 DIAGNOSIS — K21.9 GASTROESOPHAGEAL REFLUX DISEASE: ICD-10-CM

## 2021-04-30 ENCOUNTER — COMMUNICATION - HEALTHEAST (OUTPATIENT)
Dept: SURGERY | Facility: CLINIC | Age: 51
End: 2021-04-30
Payer: COMMERCIAL

## 2021-05-03 ENCOUNTER — OFFICE VISIT - HEALTHEAST (OUTPATIENT)
Dept: FAMILY MEDICINE | Facility: CLINIC | Age: 51
End: 2021-05-03

## 2021-05-03 ENCOUNTER — AMBULATORY - HEALTHEAST (OUTPATIENT)
Dept: SURGERY | Facility: AMBULATORY SURGERY CENTER | Age: 51
End: 2021-05-03

## 2021-05-03 DIAGNOSIS — R39.198 DECREASED URINE STREAM: ICD-10-CM

## 2021-05-03 DIAGNOSIS — R05.9 COUGH: ICD-10-CM

## 2021-05-03 DIAGNOSIS — R35.1 NOCTURIA: ICD-10-CM

## 2021-05-03 DIAGNOSIS — L98.9 SKIN LESION: ICD-10-CM

## 2021-05-03 DIAGNOSIS — Z00.00 HEALTHCARE MAINTENANCE: ICD-10-CM

## 2021-05-03 DIAGNOSIS — N52.9 PRIMARY ERECTILE DYSFUNCTION: ICD-10-CM

## 2021-05-03 DIAGNOSIS — Z11.59 ENCOUNTER FOR SCREENING FOR OTHER VIRAL DISEASES: ICD-10-CM

## 2021-05-03 LAB
ALBUMIN SERPL-MCNC: 4.4 G/DL (ref 3.5–5)
ALP SERPL-CCNC: 78 U/L (ref 45–120)
ALT SERPL W P-5'-P-CCNC: 17 U/L (ref 0–45)
ANION GAP SERPL CALCULATED.3IONS-SCNC: 9 MMOL/L (ref 5–18)
AST SERPL W P-5'-P-CCNC: 18 U/L (ref 0–40)
BILIRUB SERPL-MCNC: 0.5 MG/DL (ref 0–1)
BUN SERPL-MCNC: 11 MG/DL (ref 8–22)
CALCIUM SERPL-MCNC: 9.7 MG/DL (ref 8.5–10.5)
CHLORIDE BLD-SCNC: 107 MMOL/L (ref 98–107)
CHOLEST SERPL-MCNC: 152 MG/DL
CO2 SERPL-SCNC: 25 MMOL/L (ref 22–31)
CREAT SERPL-MCNC: 0.83 MG/DL (ref 0.7–1.3)
FASTING STATUS PATIENT QL REPORTED: YES
GFR SERPL CREATININE-BSD FRML MDRD: >60 ML/MIN/1.73M2
GLUCOSE BLD-MCNC: 99 MG/DL (ref 70–125)
HDLC SERPL-MCNC: 37 MG/DL
LDLC SERPL CALC-MCNC: 102 MG/DL
POTASSIUM BLD-SCNC: 4.1 MMOL/L (ref 3.5–5)
PROT SERPL-MCNC: 8 G/DL (ref 6–8)
SODIUM SERPL-SCNC: 141 MMOL/L (ref 136–145)
TRIGL SERPL-MCNC: 66 MG/DL

## 2021-05-03 ASSESSMENT — MIFFLIN-ST. JEOR: SCORE: 1818.91

## 2021-05-08 ENCOUNTER — HEALTH MAINTENANCE LETTER (OUTPATIENT)
Age: 51
End: 2021-05-08

## 2021-05-21 ENCOUNTER — AMBULATORY - HEALTHEAST (OUTPATIENT)
Dept: LAB | Facility: CLINIC | Age: 51
End: 2021-05-21

## 2021-05-21 DIAGNOSIS — Z11.59 ENCOUNTER FOR SCREENING FOR OTHER VIRAL DISEASES: ICD-10-CM

## 2021-05-22 LAB
SARS-COV-2 PCR COMMENT: NORMAL
SARS-COV-2 RNA SPEC QL NAA+PROBE: NEGATIVE
SARS-COV-2 VIRUS SPECIMEN SOURCE: NORMAL

## 2021-05-23 ENCOUNTER — COMMUNICATION - HEALTHEAST (OUTPATIENT)
Dept: SCHEDULING | Facility: CLINIC | Age: 51
End: 2021-05-23

## 2021-05-24 ENCOUNTER — ANESTHESIA - HEALTHEAST (OUTPATIENT)
Dept: SURGERY | Facility: AMBULATORY SURGERY CENTER | Age: 51
End: 2021-05-24

## 2021-05-24 ENCOUNTER — RECORDS - HEALTHEAST (OUTPATIENT)
Dept: ADMINISTRATIVE | Facility: OTHER | Age: 51
End: 2021-05-24

## 2021-05-24 ASSESSMENT — MIFFLIN-ST. JEOR: SCORE: 1788.86

## 2021-05-25 ENCOUNTER — RECORDS - HEALTHEAST (OUTPATIENT)
Dept: ADMINISTRATIVE | Facility: OTHER | Age: 51
End: 2021-05-25

## 2021-05-25 ENCOUNTER — COMMUNICATION - HEALTHEAST (OUTPATIENT)
Dept: SURGERY | Facility: CLINIC | Age: 51
End: 2021-05-25
Payer: COMMERCIAL

## 2021-05-25 ENCOUNTER — SURGERY - HEALTHEAST (OUTPATIENT)
Dept: SURGERY | Facility: AMBULATORY SURGERY CENTER | Age: 51
End: 2021-05-25
Payer: COMMERCIAL

## 2021-05-25 ASSESSMENT — MIFFLIN-ST. JEOR: SCORE: 1788.86

## 2021-05-27 NOTE — TELEPHONE ENCOUNTER
Refill Approved    Rx renewed per Medication Renewal Policy. Medication was last renewed on 3/6/18.    Karla Matute, [facility] Triage/Med Refill 4/11/2019     Requested Prescriptions   Pending Prescriptions Disp Refills     loratadine (CLARITIN) 10 mg tablet [Pharmacy Med Name: LORATADINE 10 MG TABLET] 90 tablet 3     Sig: TAKE 1 TABLET (10 MG TOTAL) BY MOUTH DAILY.       Antihistamine Refill Protocol Passed - 4/10/2019 12:31 PM        Passed - Patient has had office visit/physical in last year     Last office visit with prescriber/PCP: 2/5/2018 Genesis Robertson MD OR same dept: 2/14/2019 Giovanna Clark MD OR same specialty: 2/14/2019 Giovanna Clark MD  Last physical: 2/6/2019 Last MTM visit: Visit date not found   Next visit within 3 mo: Visit date not found  Next physical within 3 mo: Visit date not found  Prescriber OR PCP: Genesis Robertson MD  Last diagnosis associated with med order: There are no diagnoses linked to this encounter.  If protocol passes may refill for 12 months if within 3 months of last provider visit (or a total of 15 months).

## 2021-05-28 ENCOUNTER — COMMUNICATION - HEALTHEAST (OUTPATIENT)
Dept: SURGERY | Facility: CLINIC | Age: 51
End: 2021-05-28

## 2021-05-31 VITALS — BODY MASS INDEX: 33.12 KG/M2 | WEIGHT: 208.3 LBS

## 2021-05-31 VITALS — HEIGHT: 68 IN | BODY MASS INDEX: 32.13 KG/M2 | WEIGHT: 212 LBS

## 2021-05-31 VITALS — BODY MASS INDEX: 32.13 KG/M2 | WEIGHT: 212 LBS | HEIGHT: 68 IN

## 2021-05-31 VITALS — BODY MASS INDEX: 32.13 KG/M2 | HEIGHT: 68 IN | WEIGHT: 212 LBS

## 2021-05-31 VITALS — BODY MASS INDEX: 31.67 KG/M2 | WEIGHT: 209 LBS | HEIGHT: 68 IN

## 2021-06-01 VITALS — BODY MASS INDEX: 32.43 KG/M2 | WEIGHT: 214 LBS | HEIGHT: 68 IN

## 2021-06-01 VITALS — WEIGHT: 222.1 LBS | BODY MASS INDEX: 32.89 KG/M2 | HEIGHT: 69 IN

## 2021-06-01 NOTE — PROGRESS NOTES
Assessment/Plan:     Presents to clinic for cryotherapy of a verrucous lesion and for weight loss management.    Problem List Items Addressed This Visit     None      Visit Diagnoses     Class 1 obesity due to excess calories without serious comorbidity with body mass index (BMI) of 34.0 to 34.9 in adult    -  Primary    Weight loss counseling, encounter for        Verrucous skin lesion            Patient is not currently interested in weight loss clinic.  He would like to try counting calories and decreasing unhealthy snacking.  He will return in 3 months to determine progress.    Agree with patient that these lesions are likely warts and in need of cryotherapy treatment. 6 rounds per lesion were performed today.  Patient tolerated the procedure well.  Discussed with patient that home therapy as an option, otherwise patient may return to clinic for additional therapy.    Return to clinic in 3 months for weight management.    The following high BMI interventions were performed this visit: weight loss from baseline weight    Total time spent with patient was 15 minutes with greater than 50% spent in face-to-face counseling regarding the above plan.    Subjective:      Raheem Jeong is a 49 y.o. male who presents to clinic for weight loss discussion. Patient has lost 3 lbs since his last office visit 3 days ago. He would also like a wart frozen.     In the past, patient has tried exercise. His new job has caused him to retain weight more. He is worried about fitting in exercise with his daily life.  His wife has tried weight loss extensively in the past.  She is even been to the bariatric center to discuss surgical weight loss option.  She has been to many classes and he has accompanied her.  He is not quite ready to do a weight loss clinic.  He is concerned about his motivation to decrease the amount of carbohydrates in his diet.  He is convinced that walking alone should be enough to lose weight.  He believes that  "working third shift is also contributory as he gets less sleep and tries to eat all day in order to help himself maintain wakefulness.    Patient would also like a wart frozen.  Lesions are located on the right thumb.  Patient has been experiencing discomfort from these lesions or cosmetic concerns.  Patient desires cryotherapy treatment.  The risks and benefits were discussed.  Patient has had this therapy before.      Past Medical History, Family History, and Social History reviewed.     Current Outpatient Medications on File Prior to Visit   Medication Sig Dispense Refill     cholecalciferol, vitamin D3, 1,000 unit tablet Take 1,000 Units by mouth daily.       diclofenac sodium (VOLTAREN) 1 % Gel Apply twice daily 1 gram to hands as needed for pain 1 Tube 3     ibuprofen (ADVIL,MOTRIN) 800 MG tablet Take 1 tablet (800 mg total) by mouth 3 (three) times a day. 21 tablet 0     loratadine (CLARITIN) 10 mg tablet TAKE 1 TABLET (10 MG TOTAL) BY MOUTH DAILY. 90 tablet 3     multivitamin therapeutic (THERAGRAN) tablet Take 1 tablet by mouth daily.       No current facility-administered medications on file prior to visit.        Review of systems is as stated in HPI.  Endorses: back pain, joint swelling and joint pain  The remainder of the 10 system review is otherwise negative.    Objective:     /80   Pulse 91   Ht 5' 8\" (1.727 m)   Wt (!) 227 lb (103 kg)   SpO2 97%   BMI 34.52 kg/m   Body mass index is 34.52 kg/m .    Gen: Alert, NAD, appears stated age, normal hygiene   SKIN: 1 mm wart appreciated on the right thumb    This note has been dictated using voice recognition software. Any grammatical or context distortions are unintentional and inherent to the the software.     Genesis Robertson MD            "

## 2021-06-01 NOTE — PROGRESS NOTES
Preoperative Exam    Scheduled Procedure: Left arm hardware removal  Surgery Date:  9/10/19  Surgery Location: [facility], fax [phone]    Surgeon:  Dr. Min Andrews    Assessment/Plan:     1. Unstable angina pectoris (H)  - Electrocardiogram Perform and Read  Patient has a history on his problem list of unstable angina pectoris. This was placed there after a period of chest pain in January 2018. At that time he had an echo stress test (Dec 2017) which was negative for inducible ischemia. He had normal ejection fraction. Patient's CTA coronary with calcium score was 0. Since that time patient has been seeing a chiropractor who was able to alleviate the discomfort. It is now presumed musculoskeletal. Both EKGs since then have also been normal.     2. Preop general physical exam  - Electrocardiogram Perform and Read          Surgical Procedure Risk: Intermediate (reported cardiac risk generally 1-5%)  Have you had prior anesthesia?: Yes  Have you or any family members had a previous anesthesia reaction:  No  Do you or any family members have a history of a clotting or bleeding disorder?: No  Cardiac Risk Assessment: increased risk for major cardiac complications based on  unstable angina pectoris (although, upon further review, unlikely to be a true diagnosis)    APPROVAL GIVEN to proceed with proposed procedure, without further diagnostic evaluation    Functional Status: Independent  Patient plans to recover at home with family.     Subjective:      Raheem Jeong is a 49 y.o. male who presents for a preoperative consultation.  Patient originally had a surgery on his arm due to a laceration of a tendon 1.5 years ago. He had surgery in August 2018. He has been through therapy and was in a hard cast. He is now due to have the hardware removed. The hardware does cause swelling.    He endorses joint pain, back pain and joint swelling. He also endorses a cough.    All other systems  reviewed and are negative, other than those listed in the HPI.    Pertinent History  Do you have difficulty breathing or chest pain after walking up a flight of stairs: No  History of obstructive sleep apnea: No  Steroid use in the last 6 months: No  Frequent Aspirin/NSAID use: No  Prior Blood Transfusion: No  Prior Blood Transfusion Reaction: No  If for some reason prior to, during or after the procedure, if it is medically indicated, would you be willing to have a blood transfusion?:  There is no transfusion refusal.    Current Outpatient Medications   Medication Sig Dispense Refill     cholecalciferol, vitamin D3, 1,000 unit tablet Take 1,000 Units by mouth daily.       diclofenac sodium (VOLTAREN) 1 % Gel Apply twice daily 1 gram to hands as needed for pain 1 Tube 3     ibuprofen (ADVIL,MOTRIN) 800 MG tablet Take 1 tablet (800 mg total) by mouth 3 (three) times a day. 21 tablet 0     loratadine (CLARITIN) 10 mg tablet TAKE 1 TABLET (10 MG TOTAL) BY MOUTH DAILY. 90 tablet 3     multivitamin therapeutic (THERAGRAN) tablet Take 1 tablet by mouth daily.       No current facility-administered medications for this visit.         No Known Allergies    Patient Active Problem List   Diagnosis     Unstable angina pectoris (H)     Adjustment disorder with depressed mood     SHIMON (generalized anxiety disorder)     ANTHONY (obstructive sleep apnea)     Primary erectile dysfunction       Past Medical History:   Diagnosis Date     Anxiety      History of ETOH abuse        Past Surgical History:   Procedure Laterality Date     EYE SURGERY       HERNIA REPAIR         Social History     Socioeconomic History     Marital status:      Spouse name: Not on file     Number of children: Not on file     Years of education: Not on file     Highest education level: Not on file   Occupational History     Not on file   Social Needs     Financial resource strain: Not on file     Food insecurity:     Worry: Not on file     Inability: Not on  "file     Transportation needs:     Medical: Not on file     Non-medical: Not on file   Tobacco Use     Smoking status: Former Smoker     Last attempt to quit: 1998     Years since quittin.6     Smokeless tobacco: Never Used   Substance and Sexual Activity     Alcohol use: No     Comment: previous hx of etoh abuse     Drug use: No     Sexual activity: Yes     Partners: Female   Lifestyle     Physical activity:     Days per week: Not on file     Minutes per session: Not on file     Stress: Not on file   Relationships     Social connections:     Talks on phone: Not on file     Gets together: Not on file     Attends Shinto service: Not on file     Active member of club or organization: Not on file     Attends meetings of clubs or organizations: Not on file     Relationship status: Not on file     Intimate partner violence:     Fear of current or ex partner: Not on file     Emotionally abused: Not on file     Physically abused: Not on file     Forced sexual activity: Not on file   Other Topics Concern     Not on file   Social History Narrative     Not on file       Patient Care Team:  Genesis Robertson MD as PCP - General (Family Medicine)  Genesis Robertson MD as Assigned PCP          Objective:     Vitals:    19 0806   BP: 100/72   Pulse: 60   SpO2: 98%   Weight: (!) 230 lb (104.3 kg)   Height: 5' 8\" (1.727 m)         Physical Exam:  Gen: Alert, NAD, appears stated age, normal hygiene   Eyes: conjunctivae without injection, sclera clear, EOMI  ENT/mouth: nares clear, septum midline, absent rhinorrhea, throat without injection, neck is supple, no thyroid enlargement  CV: RRR, no murmur appreciated, pedal edema absent bilaterally  Resp: CTAB, no wheezes, rales or ronchi  ABD: normoactive, non-tender to palpation, nondistended  MSK: grossly full range of motion in all joints, no obvious deformity  Neuro: CN II-XII grossly intact, no deficits in coordination  Psych: no apparent hallucinations or " delusions, no pressured speech; alert, oriented x3  SKIN: dry and without lesions except a very small verrucous lesion on the thumb  Heme/lymph: no pallor, no active bleeding/bruising, no adenopathy appreciated      There are no Patient Instructions on file for this visit.    EKG:  Normal sinus rhythm, final read pending    Labs:  Not indicated (no history of diabetes, not on any diuretics or potassium supplements, surgery unlikely to result in significant blood loss)    Immunization History   Administered Date(s) Administered     Influenza [id number], 02/11/2010     Influenza, inj, historic,unspecified 10/01/2017, 12/26/2018     Influenza, seasonal,quad inj 6-35 mos 08/27/2017     Influenza,seasonal quad, PF, =/> 6months 09/19/2014, 09/15/2015, 10/08/2016     Influenza,seasonal, Inj IIV3 10/31/2012, 11/21/2013     Tdap 12/20/2012           Electronically signed by Genesis Robertson MD 09/05/19 8:03 AM

## 2021-06-02 VITALS — WEIGHT: 234 LBS | BODY MASS INDEX: 35.58 KG/M2

## 2021-06-02 VITALS — WEIGHT: 225 LBS | BODY MASS INDEX: 36.32 KG/M2 | BODY MASS INDEX: 33.71 KG/M2 | HEIGHT: 66 IN

## 2021-06-02 VITALS — BODY MASS INDEX: 36.07 KG/M2 | WEIGHT: 238 LBS | HEIGHT: 68 IN

## 2021-06-02 VITALS — HEIGHT: 68 IN | WEIGHT: 231 LBS | BODY MASS INDEX: 35.01 KG/M2

## 2021-06-03 VITALS
BODY MASS INDEX: 34.86 KG/M2 | SYSTOLIC BLOOD PRESSURE: 100 MMHG | OXYGEN SATURATION: 98 % | HEIGHT: 68 IN | WEIGHT: 230 LBS | HEART RATE: 60 BPM | DIASTOLIC BLOOD PRESSURE: 72 MMHG

## 2021-06-03 VITALS
DIASTOLIC BLOOD PRESSURE: 80 MMHG | WEIGHT: 227 LBS | HEIGHT: 68 IN | OXYGEN SATURATION: 97 % | HEART RATE: 91 BPM | BODY MASS INDEX: 34.4 KG/M2 | SYSTOLIC BLOOD PRESSURE: 110 MMHG

## 2021-06-03 NOTE — TELEPHONE ENCOUNTER
Spoke estephania/[facility] Policy/Procedures for ins referrals. [facility] and [place] is on a bypass with [employer] ins policy/plan, no ins referral required.   Spoke estephania/Ene at [facility] Financial office, went over the bypass with her for [place] with [employer] Ins, she did clarify that they use the same tax ID and NPI for their [place] as they do for [facility] office. She noted on her end that [facility] is on a byass, no insurance required.  Spoke Jacqui, explained above info, he stated his understanding.

## 2021-06-03 NOTE — TELEPHONE ENCOUNTER
Kd w/Raheem, went over his question regarding an insurance referral for [employer]. Explained that his insurance is on a bypass with [employer] since he is staying within our care system ([facility]/Dr Andrews) as for the surgery center/hospital that is [facility], this is our facility so no ins for [facility] either.  Raheem stated that if he was told by Ene best/Nora Rodríguez that if he does not get a referral he can not have the surgery. I requested the info for Ene best/Nora hansen ([phone]) to give her a call.   LDM for Ene explaining above info and requesting that she call me bk at my direct # to verify she recvd my message and to go over exactly what is needed for Raheem to proceed with surgery on 12.16.2019.

## 2021-06-03 NOTE — TELEPHONE ENCOUNTER
Left message to call back for: pt  Information to relay to patient:  Please help pt schedule a pre op with Dr. Robertson. No openings on this day but Dr. Robertson has multiple openings before surgery please schedule pre op in a open slot

## 2021-06-03 NOTE — PROGRESS NOTES
Preoperative Exam    Scheduled Procedure: left hand upper middle knuckle replacement   Surgery Date:  12/16/19  Surgery Location: [facility]   Surgeon:  Dr. Andrews     Assessment/Plan:     1. Arthritis of hand  -Surgery not indicated given failure of other more conservative options    2. Preoperative examination  Only concerning finding in patient's history is his history of CPAP dependent sleep apnea, and new weight gain up to the weight at which he previously was diagnosed with sleep apnea and is now not currently treated.  Patient is very reluctant to have another sleep study but was willing to follow-up with a sleep medicine specialist.  I do not believe that this should constitute a concern for his upcoming surgery.    3. ANTHONY (obstructive sleep apnea)  - Ambulatory referral to Sleep Medicine    4. Viral warts, unspecified type  - Ambulatory referral to Dermatology      Surgical Procedure Risk: Low (reported cardiac risk generally < 1%)  Have you had prior anesthesia?: Yes  Have you or any family members had a previous anesthesia reaction:  Yes: gets sick when he wakes up   Do you or any family members have a history of a clotting or bleeding disorder?: No  Cardiac Risk Assessment: no increased risk for major cardiac complications    APPROVAL GIVEN to proceed with proposed procedure, without further diagnostic evaluation    Please Note:  Patient has a history of sleep apnea, previously CPAP dependent, lost weight and now no longer feels he has apnea. Sleep study pending but will not occur prior to surgery.     Functional Status: Independent  Patient plans to recover at home alone.     Subjective:      Raheem Jeong is a 49 y.o. male who presents for a preoperative consultation.    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 He has numerous xrays confirming this. He has no cartilage left. He did have trigger finger surgery on it years ago, arthritis set in after that,  duration about 4 years. He has been dealing with the pain for about 5 years. He has tried steroid shots before and it barely helps. He is on light duty, rest helps. He is not taking NSAIDs or over the counter pain relievers. The pain does not radiate, it stays local. Severity described as 1-7/10. It is worsened by bumping it, moving it repetitively, using the joint regularly. Pain is described as a burning and stabbing pain.     He does endorses arthritis and joint pain.     All other systems reviewed and are negative, other than those listed in the HPI.    Pertinent History  Do you have difficulty breathing or chest pain after walking up a flight of stairs: No  History of obstructive sleep apnea: No  Steroid use in the last 6 months: No  Frequent Aspirin/NSAID use: No  Prior Blood Transfusion: No  Prior Blood Transfusion Reaction: No  If for some reason prior to, during or after the procedure, if it is medically indicated, would you be willing to have a blood transfusion?:  There is no transfusion refusal.    Current Outpatient Medications   Medication Sig Dispense Refill     cholecalciferol, vitamin D3, 1,000 unit tablet Take 1,000 Units by mouth daily.       loratadine (CLARITIN) 10 mg tablet TAKE 1 TABLET (10 MG TOTAL) BY MOUTH DAILY. 90 tablet 3     multivitamin therapeutic (THERAGRAN) tablet Take 1 tablet by mouth daily.       sildenafil (VIAGRA) 50 MG tablet TAKE 1/2 TO 1 TABLET BY MOUTH 1 HOUR PRIOR TO SEX.  0     No current facility-administered medications for this visit.         No Known Allergies    Patient Active Problem List   Diagnosis     Adjustment disorder with depressed mood     SHIMON (generalized anxiety disorder)     ANTHONY (obstructive sleep apnea)     Primary erectile dysfunction       Past Medical History:   Diagnosis Date     Anxiety      History of ETOH abuse        Past Surgical History:   Procedure Laterality Date     EYE SURGERY       HERNIA REPAIR         Social History     Socioeconomic  "History     Marital status:      Spouse name: Not on file     Number of children: Not on file     Years of education: Not on file     Highest education level: Not on file   Occupational History     Not on file   Social Needs     Financial resource strain: Not on file     Food insecurity:     Worry: Not on file     Inability: Not on file     Transportation needs:     Medical: Not on file     Non-medical: Not on file   Tobacco Use     Smoking status: Former Smoker     Last attempt to quit: 1998     Years since quittin.9     Smokeless tobacco: Never Used   Substance and Sexual Activity     Alcohol use: No     Comment: previous hx of etoh abuse     Drug use: No     Sexual activity: Yes     Partners: Female   Lifestyle     Physical activity:     Days per week: Not on file     Minutes per session: Not on file     Stress: Not on file   Relationships     Social connections:     Talks on phone: Not on file     Gets together: Not on file     Attends Christian service: Not on file     Active member of club or organization: Not on file     Attends meetings of clubs or organizations: Not on file     Relationship status: Not on file     Intimate partner violence:     Fear of current or ex partner: Not on file     Emotionally abused: Not on file     Physically abused: Not on file     Forced sexual activity: Not on file   Other Topics Concern     Not on file   Social History Narrative     Not on file       Patient Care Team:  Genesis Robertson MD as PCP - General (Family Medicine)  Genesis Robertson MD as Assigned PCP          Objective:     Vitals:    19 1459   BP: 110/72   Pulse: 80   Weight: (!) 239 lb 4.8 oz (108.5 kg)   Height: 5' 6\" (1.676 m)         Physical Exam:  Gen: Alert, NAD, appears stated age, normal hygiene   Eyes: conjunctivae without injection, sclera clear, EOMI  ENT/mouth: nares clear, septum midline, absent rhinorrhea, throat without injection, neck is supple, no thyroid " enlargement  CV: RRR, no murmur appreciated, pedal edema absent bilaterally  Resp: CTAB, no wheezes, rales or ronchi  ABD: normoactive, non-tender to palpation, nondistended  MSK: grossly full range of motion in all joints, no obvious deformity  Neuro: CN II-XII grossly intact, no deficits in coordination  Psych: no apparent hallucinations or delusions, no pressured speech; alert, oriented x3  SKIN: Deep wart present on patient's finger  Heme/lymph: no pallor, no active bleeding/bruising, no adenopathy appreciated      There are no Patient Instructions on file for this visit.    EKG:  Not indicated    Labs:  No labs were ordered during this visit    Immunization History   Administered Date(s) Administered     Influenza [id number], 02/11/2010     Influenza, inj, historic,unspecified 10/01/2017, 12/26/2018     Influenza, seasonal,quad inj 6-35 mos 08/27/2017     Influenza,seasonal quad, PF, =/> 6months 09/19/2014, 09/15/2015, 10/08/2016     Influenza,seasonal, Inj IIV3 10/31/2012, 11/21/2013     Influenza,seasonal,quad inj =/> 6months 09/05/2019     Tdap 12/20/2012           Electronically signed by Genesis Robertson MD 11/25/19 3:02 PM

## 2021-06-03 NOTE — TELEPHONE ENCOUNTER
New Appointment Needed  What is the reason for the visit:    Pre-Op Appt Request  When is the surgery? :  Dec. 16  Where is the surgery?:   [facility]  Who is the surgeon? :  Dr. Min Andrews  What type of surgery is being done?: Knuckle replacement  Provider Preference: PCP only  How soon do you need to be seen?: Monday Nov. 25, 8:30-10 am or any time after 2 pm.  Waitlist offered?: No  Okay to leave a detailed message:  Yes

## 2021-06-03 NOTE — TELEPHONE ENCOUNTER
Spoke w/Raheem again to let him know that I had to leave a message for Ene to call me bk to go over our discussion. I will call him back once I hear back from Ene. He did ask if we could leave a detailed message on his vm as he works third shift and may be sleeping.

## 2021-06-03 NOTE — TELEPHONE ENCOUNTER
Received VM from Ene @ [facility] - she states the surgery will be completed at the [facility], not [facility], and per [employer] patient will need a referral in order to have the surgery completed there.

## 2021-06-03 NOTE — TELEPHONE ENCOUNTER
Referral Request  Type of referral: Orthopedic referral   Who s requesting: Patient  Why the request: Patient stated it is regards a surgery for his middle finger on his left had.  Have you been seen for this request: No:  Appointment Offered:  declined  Does patient have a preference on a group/provider? Patient stated he is scheduled to have surgery 12/16/19 with Dr. Andrews at [facility].  Okay to leave a detailed message?  Yes

## 2021-06-04 VITALS
BODY MASS INDEX: 38.46 KG/M2 | DIASTOLIC BLOOD PRESSURE: 72 MMHG | SYSTOLIC BLOOD PRESSURE: 110 MMHG | HEART RATE: 80 BPM | HEIGHT: 66 IN | WEIGHT: 239.3 LBS

## 2021-06-06 NOTE — PROGRESS NOTES
Chief Complaint   Patient presents with     Cough     fevers, nausea, diarrhea, gas, dizziness, coughing up mucus, wheezing x 2 days        HPI:  Raheem Jeong is a 49 y.o. male who presents today complaining of body aches, fever, nausea, diarrhea, cough, and lightheadedness for the past 2 days.    History obtained from the patient.    Problem List:  2018: Pleurisy  2018: Unstable angina pectoris (H)  2018: Other chest pain  2016: SHIMON (generalized anxiety disorder)  2015: Primary erectile dysfunction  2014: Adjustment disorder with depressed mood  2011: ANTHONY (obstructive sleep apnea)      Past Medical History:   Diagnosis Date     Anxiety      History of ETOH abuse        Social History     Tobacco Use     Smoking status: Former Smoker     Last attempt to quit: 1998     Years since quittin.1     Smokeless tobacco: Never Used   Substance Use Topics     Alcohol use: No     Comment: previous hx of etoh abuse       Review of Systems   Constitutional: Positive for chills and fever.   HENT: Positive for congestion. Negative for ear pain and sore throat.    Respiratory: Positive for cough.    Gastrointestinal: Positive for nausea. Negative for abdominal pain and vomiting.   Neurological: Positive for light-headedness.       Vitals:    20 0803   BP: 117/82   Pulse: (!) 101   Resp: 14   Temp: 99.1  F (37.3  C)   TempSrc: Oral   SpO2: 93%   Weight: (!) 243 lb 11.2 oz (110.5 kg)       Physical Exam  Vitals signs and nursing note reviewed. Exam conducted with a chaperone present.   Constitutional:       General: He is not in acute distress.     Appearance: He is well-developed. He is not diaphoretic.   HENT:      Head: Normocephalic and atraumatic.      Right Ear: Tympanic membrane, ear canal and external ear normal.      Left Ear: Tympanic membrane, ear canal and external ear normal.      Nose: Rhinorrhea present. No congestion.      Mouth/Throat:      Pharynx: No oropharyngeal exudate or  posterior oropharyngeal erythema.   Eyes:      General:         Right eye: No discharge.         Left eye: No discharge.      Conjunctiva/sclera: Conjunctivae normal.   Cardiovascular:      Rate and Rhythm: Regular rhythm. Tachycardia present.      Heart sounds: Normal heart sounds. No murmur.   Pulmonary:      Effort: Pulmonary effort is normal. No respiratory distress.      Breath sounds: Normal breath sounds. No wheezing, rhonchi or rales.   Neurological:      Mental Status: He is alert.   Psychiatric:         Behavior: Behavior normal.         Thought Content: Thought content normal.         Judgment: Judgment normal.       Labs:  Recent Results (from the past 72 hour(s))   Influenza A/B Rapid Test- Nasal Swab   Result Value Ref Range    Influenza  A, Rapid Antigen Influenza A antigen detected (!) No Influenza A antigen detected    Influenza B, Rapid Antigen No Influenza B antigen detected No Influenza B antigen detected         Clinical Decision Making:  Patient tested positive for influenza A today.  He is within treatment time period.  We discussed pros and cons of Tamiflu, the patient opted in on medication.  Patient was also prescribed Tessalon Perles for cough relief.  At the end of the encounter, I discussed results, diagnosis, medications. Discussed red flags for immediate return to clinic/ER, as well as indications for follow up if no improvement. Patient understood and agreed to plan. Patient was stable for discharge.    1. Influenza A  benzonatate (TESSALON) 200 MG capsule    oseltamivir (TAMIFLU) 75 MG capsule    DISCONTINUED: oseltamivir (TAMIFLU) 75 MG capsule   2. Cough  Influenza A/B Rapid Test- Nasal Swab         Patient Instructions     1. Take Tylenol or Ibuprofen as needed for fever relief.   2. Take Tamiflu, follow directions on prescription.  3. Increase fluids and rest.  4. Influenza is typically considered contagious one day prior and 5-7 days after your symptoms begin. I recommend  returning to work/school after you are fever free for 24 hours. Continue to practice good hand hygiene and cough coverage well after you are fever free.   5. Follow up if you develop fever that can not be controlled, difficulty breathing, or severe dehydration.    Influenza  Influenza ( the flu ) is an infection that affects your respiratory tract (the mouth, nose, and lungs, and the passages between them). Unlike a cold, the flu can make you very ill. And it can lead to pneumonia, a serious lung infection. For some people, especially older adults, young children, and people with certain chronic conditions, the flu can have serious complications and even be fatal.  How Does the Flu Spread?  The flu is caused by viruses. The viruses spread through the air in droplets when someone who has the flu coughs, sneezes, laughs, or talks. You can become infected when you inhale these viruses directly. You can also become infected when you touch a surface on which the droplets have landed and then transfer the germs to your eyes, nose, or mouth. Touching used tissues, or sharing utensils, drinking glasses, or a toothbrush with an infected person can expose you to flu viruses, too.  What Are the Symptoms of the Flu?  Flu symptoms tend to come on quickly and may last a few days to a few weeks. They include:    Fever usually higher than 101 F  (38.3 C) and chills    Sore throat and headache    Dry cough    Runny nose    Tiredness and weakness    Muscle aches  Factors That Can Make Flu Worse  For some people, the flu can be very serious. The risk of complications is greater for:    Children under age 5.    Adults 65 years of age and older.    People with a chronic illness, such as diabetes or heart, kidney, or lung disease.    People who live in a nursing home or long-term care facility.   How Is the Flu Treated?  Influenza usually improves after 7 days or so. In some cases, your health care provider may prescribe an antiviral  medication. This may help you get well sooner. For the medication to help, you need to take it as soon as possible (ideally within 48 hours) after your symptoms start. If you develop pneumonia or other serious illness, hospital care may be needed.  Easing Flu Symptoms    Drink lots of fluids such as water, juice, and warm soup. A good rule is to drink enough so that you urinate your normal amount.    Get plenty of rest.    Ask your health care provider what to take for fever and pain.    Call your provider if your fever rises over 101 F (38.3 C) or you become dizzy, lightheaded, or short of breath.

## 2021-06-06 NOTE — PATIENT INSTRUCTIONS - HE
1. Take Tylenol or Ibuprofen as needed for fever relief.   2. Take Tamiflu, follow directions on prescription.  3. Increase fluids and rest.  4. Influenza is typically considered contagious one day prior and 5-7 days after your symptoms begin. I recommend returning to work/school after you are fever free for 24 hours. Continue to practice good hand hygiene and cough coverage well after you are fever free.   5. Follow up if you develop fever that can not be controlled, difficulty breathing, or severe dehydration.    Influenza  Influenza ( the flu ) is an infection that affects your respiratory tract (the mouth, nose, and lungs, and the passages between them). Unlike a cold, the flu can make you very ill. And it can lead to pneumonia, a serious lung infection. For some people, especially older adults, young children, and people with certain chronic conditions, the flu can have serious complications and even be fatal.  How Does the Flu Spread?  The flu is caused by viruses. The viruses spread through the air in droplets when someone who has the flu coughs, sneezes, laughs, or talks. You can become infected when you inhale these viruses directly. You can also become infected when you touch a surface on which the droplets have landed and then transfer the germs to your eyes, nose, or mouth. Touching used tissues, or sharing utensils, drinking glasses, or a toothbrush with an infected person can expose you to flu viruses, too.  What Are the Symptoms of the Flu?  Flu symptoms tend to come on quickly and may last a few days to a few weeks. They include:    Fever usually higher than 101 F  (38.3 C) and chills    Sore throat and headache    Dry cough    Runny nose    Tiredness and weakness    Muscle aches  Factors That Can Make Flu Worse  For some people, the flu can be very serious. The risk of complications is greater for:    Children under age 5.    Adults 65 years of age and older.    People with a chronic illness, such  as diabetes or heart, kidney, or lung disease.    People who live in a nursing home or long-term care facility.   How Is the Flu Treated?  Influenza usually improves after 7 days or so. In some cases, your health care provider may prescribe an antiviral medication. This may help you get well sooner. For the medication to help, you need to take it as soon as possible (ideally within 48 hours) after your symptoms start. If you develop pneumonia or other serious illness, hospital care may be needed.  Easing Flu Symptoms    Drink lots of fluids such as water, juice, and warm soup. A good rule is to drink enough so that you urinate your normal amount.    Get plenty of rest.    Ask your health care provider what to take for fever and pain.    Call your provider if your fever rises over 101 F (38.3 C) or you become dizzy, lightheaded, or short of breath.

## 2021-06-09 NOTE — TELEPHONE ENCOUNTER
Refill Approved    Rx renewed per Medication Renewal Policy. Medication was last renewed on 3/24/20.    Karla Matute, [facility] Triage/Med Refill 6/19/2020     Requested Prescriptions   Pending Prescriptions Disp Refills     loratadine (CLARITIN) 10 mg tablet [Pharmacy Med Name: LORATADINE 10 MG TABLET] 90 tablet 0     Sig: TAKE 1 TABLET BY MOUTH EVERY DAY       Antihistamine Refill Protocol Passed - 6/19/2020 12:10 AM        Passed - Patient has had office visit/physical in last year     Last office visit with prescriber/PCP: Visit date not found OR same dept: 9/16/2019 Genesis Robertson MD OR same specialty: 9/16/2019 Genesis Robertson MD  Last physical: Visit date not found Last MTM visit: Visit date not found   Next visit within 3 mo: Visit date not found  Next physical within 3 mo: Visit date not found  Prescriber OR PCP: Isaak Moran MD  Last diagnosis associated with med order: 1. Chronic rhinitis  - loratadine (CLARITIN) 10 mg tablet [Pharmacy Med Name: LORATADINE 10 MG TABLET]; TAKE 1 TABLET BY MOUTH EVERY DAY  Dispense: 90 tablet; Refill: 0    If protocol passes may refill for 12 months if within 3 months of last provider visit (or a total of 15 months).

## 2021-06-10 NOTE — PROGRESS NOTES
ASSESSMENT:   1. Fever  XR Chest PA and Lateral    Rapid Strep A Screen-Throat    HM1(CBC and Differential)    HM1 (CBC with Diff)    acetaminophen tablet 650 mg (TYLENOL)   2. Cough  XR Chest PA and Lateral   3. Strep throat  azithromycin (ZITHROMAX Z-EMILIE) 250 MG tablet   4. Pneumonia  azithromycin (ZITHROMAX Z-EMILIE) 250 MG tablet    albuterol (PROAIR HFA;PROVENTIL HFA;VENTOLIN HFA) 90 mcg/actuation inhaler   5. Tachycardia       Results for orders placed or performed in visit on 05/22/17   Rapid Strep A Screen-Throat   Result Value Ref Range    Rapid Strep A Antigen Group A Strep detected (!) No Group A Strep detected, presumptive negative   HM1 (CBC with Diff)   Result Value Ref Range    WBC 8.3 4.0 - 11.0 thou/uL    RBC 4.45 4.40 - 6.20 mill/uL    Hemoglobin 13.8 (L) 14.0 - 18.0 g/dL    Hematocrit 40.0 40.0 - 54.0 %    MCV 90 80 - 100 fL    MCH 30.9 27.0 - 34.0 pg    MCHC 34.4 32.0 - 36.0 g/dL    RDW 11.8 11.0 - 14.5 %    Platelets 224 140 - 440 thou/uL    MPV 7.3 7.0 - 10.0 fL    Neutrophils % 66 50 - 70 %    Lymphocytes % 18 (L) 20 - 40 %    Monocytes % 14 (H) 2 - 10 %    Eosinophils % 1 0 - 6 %    Basophils % 1 0 - 2 %    Neutrophils Absolute 5.5 2.0 - 7.7 thou/uL    Lymphocytes Absolute 1.5 0.8 - 4.4 thou/uL    Monocytes Absolute 1.2 (H) 0.0 - 0.9 thou/uL    Eosinophils Absolute 0.1 0.0 - 0.4 thou/uL    Basophils Absolute 0.1 0.0 - 0.2 thou/uL     Xr Chest Pa And Lateral    Result Date: 5/22/2017  XR CHEST PA AND LATERAL 5/22/2017 12:49 PM INDICATION: Fever. COMPARISON: None. FINDINGS: Mild opacities projecting over the middle and right lower lobes suggesting pneumonia. Recommend follow-up to resolution in 6-8 weeks with PA and lateral radiographs. Remaining lungs are clear. Mild perihilar airway thickening. No pleural effusion or pneumothorax. Normal heart size. NOTE: ABNORMAL REPORT THE DICTATION ABOVE DESCRIBES AN ABNORMALITY FOR WHICH FOLLOW-UP IS NEEDED.  This report was electronically interpreted by:  Dr. Nico Peterson DO ON 05/22/2017 at 12:56    I personally reviewed the xray, possible infiltrate in RLL.     Tachycardia likely secondary to fever. Looks well considering fever, strep and pneumonia. No acute chest symptoms.    PLAN:  Strep throat and pneumonia  Azithromycin prescribed. Recommend probiotics such as acidophillis and bifidus to replace the normal bacteria that lives in the colon and gets depleted by antibiotics. You can buy it as an over the counter supplement (Culturelle, Florajen) or eat yogurt with live or active cultures.  Take Tylenol or ibuprofen as needed for fevers or body aches  Proair inhaler prescribed for any wheezing, shortness of breath  Push fluids, get extra rest  Recommend hot tea with lemon/honey, lozenges, chloraseptic spray or salt water gargles to soothe your throat  Recommend hot steamy showers, saline nasal spray or a netti pot to relieve congestion  May use a cough suppressant or a cool mist humidifier to lessen cough  Return to clinic if fevers are not resolving in 3 days, or if no significant improvement in symptoms in 1 wk.     SUBJECTIVE:   Raheem Jeong is a 46 y.o. male presents today with cold symptoms for 6 days. He has had fever x 5 days with tmax 102.4, nasal congestion, dry cough, mild sore throat, mild SOB, wheezing, decreased appetite, dizziness, myalgia but denies chest pain,  abd pain, n/v, urinary symptoms, flank pain. Sick contacts: wife. Has tried tylenol, sudafed, mucinex with temp relief. Last Tylenol was at 7am today. Nonsmoker. No hx of asthma.     [place] [facility] [place]x.       No past medical history on file.    History   Smoking Status     Never Smoker   Smokeless Tobacco     Not on file       Current Medications:  Current Outpatient Prescriptions   Medication Sig Dispense Refill     cholecalciferol, vitamin D3, 1,000 unit tablet Take 1,000 Units by mouth daily.       loratadine (CLARITIN) 10 mg tablet Take 10 mg  by mouth daily.       multivitamin therapeutic (THERAGRAN) tablet Take 1 tablet by mouth daily.       albuterol (PROAIR HFA;PROVENTIL HFA;VENTOLIN HFA) 90 mcg/actuation inhaler Inhale 2 puffs every 4 (four) hours as needed for wheezing. 1 each 0     azithromycin (ZITHROMAX Z-EMILIE) 250 MG tablet Take 2 tablets (500 mg) on  Day 1,  followed by 1 tablet (250 mg) once daily on Days 2 through 5. 6 tablet 0     No current facility-administered medications for this visit.        Allergies:   No Known Allergies    OBJECTIVE:   Vitals:    05/22/17 1214 05/22/17 1244   BP: 100/70    Pulse: (!) 130 (!) 130   Resp: 20    Temp: (!) 101.4  F (38.6  C)    TempSrc: Oral    SpO2: 96%    Weight: 208 lb 4.8 oz (94.5 kg)      Physical exam reveals a pleasant 46 y.o. male.   Appears alert and cooperative.  Eyes:  STEPHANIE, EOMI  Ears:  normal TMs bilaterally and normal canals bilaterally  Nose:    Mucosa normal. Scant, clear rhinorrhea.septum midline, normal mucosa and clear rhinorrhea  Mouth:  Mucosa pink and moist.  mild erythema and tonsillar hypertrophy, 3+   Neck: normal, supple and no adenopathy  Sinuses: nontender with palpation  Lungs: Chest is clear, no wheezing or rales. Symmetric air entry throughout both lung fields. Occasional dry cough  Heart: tachy, regular rhythm, no murmur, rub or gallop

## 2021-06-13 NOTE — TELEPHONE ENCOUNTER
Due to be seen    Rx renewed per Medication Renewal Policy. Medication was last renewed on 6/19/20.    Karla Matute, [facility] Triage/Med Refill 12/18/2020     Requested Prescriptions   Pending Prescriptions Disp Refills     loratadine (CLARITIN) 10 mg tablet [Pharmacy Med Name: LORATADINE 10 MG TABLET] 90 tablet 1     Sig: TAKE 1 TABLET BY MOUTH EVERY DAY       Antihistamine Refill Protocol Passed - 12/16/2020  4:07 PM        Passed - Patient has had office visit/physical in last year     Last office visit with prescriber/PCP: Visit date not found OR same dept: Visit date not found OR same specialty: 9/16/2019 Genesis Robertson MD  Last physical: Visit date not found Last MTM visit: Visit date not found   Next visit within 3 mo: Visit date not found  Next physical within 3 mo: Visit date not found  Prescriber OR PCP: Isaak Moran MD  Last diagnosis associated with med order: 1. Chronic rhinitis  - loratadine (CLARITIN) 10 mg tablet [Pharmacy Med Name: LORATADINE 10 MG TABLET]; TAKE 1 TABLET BY MOUTH EVERY DAY  Dispense: 90 tablet; Refill: 1    If protocol passes may refill for 12 months if within 3 months of last provider visit (or a total of 15 months).

## 2021-06-13 NOTE — PROGRESS NOTES
"Raheem Jeong is a 50 y.o. male who is being evaluated via a billable video visit.      The patient has been notified of following:     \"This video visit will be conducted via a call between you and your physician/provider. We have found that certain health care needs can be provided without the need for an in-person physical exam.  This service lets us provide the care you need with a video conversation.  If a prescription is necessary we can send it directly to your pharmacy.  If lab work is needed we can place an order for that and you can then stop by our lab to have the test done at a later time.    Video visits are billed at different rates depending on your insurance coverage. Please reach out to your insurance provider with any questions.    If during the course of the call the physician/provider feels a video visit is not appropriate, you will not be charged for this service.\"    Patient has given verbal consent to a Video visit? Yes  How would you like to obtain your AVS? AVS Preference: MyChart.  If dropped by the video visit, the video invitation should be sent to: Text to cell phone: [phone]  Will anyone else be joining your video visit?         Video Start Time: 10:24 AM    Additional provider notes: a        New Medical Weight Management Consult    PATIENT:  Raheem Jeong  MRN:         [id number]  :         1970  JESSI:         2020    Dear Dr. Robertson,    I had the pleasure of seeing your patient, Raheem Jeong.  Full intake/assessment was done to determine barriers to weight loss success and develop a treatment plan. Raheem Jeong is a 50 y.o. male interested in treatment of medical problems associated with weight.   He is a third shift, straight nights worker at a [employer] with a long commute from [place] to [place] (40-100minutes depending on weather). His fractured sleep patterns and reliance on convenience eating and prone to eating to physical fullness has promoted " "weight gain over the last several years. He's a sedentary man apart from his job with half standing/half seated and lack of exercise will encourage weight regain over time so we've set some goals for raising his activity this winter during his weight loss phase.    His wife will be meeting with me later today as well and the combined effort should help consistency.    He's had some addiction issues with alcohol abuse in the past and requests avoiding any addictive medications/phentermine or controlled substances in general. He was open to a trial of topamax given he drinks a lot of diet soda. Excess artificial sweetener has been shown to disrupt gut microbiome and encourage less metabolicallly beneficial environment and increase carb craving.  We'll shoot for reduction in consumption to no more than once daily.    He recovered over the last month from COVID without any clear lingering problems other than some increased fatigue and sedentary behavior.       Works 10pm to 6am Monday through Friday with breaks at 12:30am and 3:30am. Tends to sleep from 8am to 1-2pm and perhaps grab a nap before going into work/leaving for work around 8:45pm depending on weather.    He has mild ANTHONY and hasn't been using his oral appliance the last night as it was \"hard to breathe with it\" during his COVID illness. He's getting teeth pulled soon which will affect his intake/chewing for awhile \"8 molars\".     Plan:    Plan:  1. Welcome to your weight loss season. To start, aim to get your first meal in within 2 hours of waking, reduce soda consumption of artifical sweetener to no more than once daily, less is fine. Hydrate well with water 64-80 oz per day. Unsweetened tea/coffee is OK in moderation or in herbal/decaf forms.  Aim for 30g of lean protein at 3 meals daily to start (see below for examples). Meals should be about 4-5 hours apart and if longer then intentional protein snack/complex carb combo should occur to bridge to your next " "meal (size of 150-220kcal per snack and at least 10g of lean protein).     2. Trial of topamax: start one tablet daily and if tolerating, increase to two tablets daily for now (50mg). See below.    3. Follow up as planned with dietician.    4. Start strengthening program/home video or walking regimen at least for 10 minutes 3-4 days weekly. Aiming to ramp up activity over the next 3-4 months to allow 150minutes/week or more to optimize general health and weight maintenance going forward.    5. Labs can be done anytime.    6. Continue to treat sleep apnea.    Vitals:    12/16/20 1000   Weight: (!) 249 lb (112.9 kg)   Height: 5' 7\" (1.702 m)     Body mass index is 39 kg/m .      ASSESSMENT:  Raheem is a patient with mature  onset class ii obesity   with and without significant element of familial/genetic influence and   with current health consequences. He   does need aggressive weight loss plan due to presence of ANTHONY/morbid obesity risks..  Raheem Jeong eats to obtain specific degree of fullness and has a disorganized meal pattern.    His problem is complicated by strong craving/reward pathways and poor lifestyle choices    His ability to lose weight is impacted by current work life, inability to perceive that food intake is at a level that prevents weight loss, lack of education on nutrition and dietary needs and socioeconomic situation.    PLAN:    Eat breakfast daily, Decrease portion sizes, No meals in front of TV screen, Purge house of food triggers, No meal skipping, Keep food diary for 3 weeks, Dietician visit of education, Seek structured exercise program and Sleep apnea adherence encouraged    Craving/Reward   Ancillary testing:  N/A.  Food Plan:  High protein/low carbohydrate.   Activity Plan:  start walking/strength program.  Supplementary:  N/A.   Medication:  The patient will begin medication in pursuit of improved medical status as influenced by body weight. He will start topiramate. Patient was made " "aware that topiramate is not approved for the treatment of obesity.  There is a mutual understanding of the goals and risks of this therapy. The patient is in agreement. He is educated on dosage regimen and possible side effects.        No orders of the defined types were placed in this encounter.      He has the following co-morbidities:    [facility] CO-MORBIDITIES 12/13/2020   How has your weight changed over the last year?  Gained   How many pounds? 20   I have the following health issues associated with obesity: Sleep Apnea   I have the following symptoms associated with obesity: Knee Pain, Back Pain, Fatigue   was down to 195 lbs about 5 years ago, felt great at that weight. No ANTHONY at that weight and less aches/pains and hoping to get back to that similar situation.  Currently feeling that his sleeping issues are magnified and noticed that 3rd shift eating habits. Low motivation job currently and perhaps some more comfort eating.  Recovered from positive COVID test, classic symptoms.    Patient goals reviewed with patient 12/13/2020   I am interested in having a healthier weight to diminish current health problems: Yes   I am interested in having a healthier weight in order to prevent future health problems: Yes   I am interested in having a healthier weight in order to have a future surgery: No   I have the following family history of obesity/being overweight:  My father is overweight, One or more of my siblings are overweight, Many of my relatives are overweight   I have the following family history of obesity/being overweight:  My father is overweight, One or more of my siblings are overweight, Many of my relatives are overweight       Referring Provider 12/13/2020   Please name the provider who referred you to Medical Weight Management.  If you do not know, please answer: \"I Don't Know\". self        Weight History Reviewed With Patient 12/13/2020   How concerned are you about your weight? Somewhat " Concerned   Would you describe your weight gain as gradual? Yes   I became overweight: As a Child   The following factors have contributed to my weight gain:  Mental Health Issues, Change in Schedule, Eating Wrong Types of Food, Eating Too Much, Lack of Exercise, Genetic (Runs in the Family), Stress   My lowest weight since age 18 was: 175   My highest weight since age 18 was: 250   The most weight I have ever lost was: (lbs) 33   weight loss in the past was through self guided means..    Diet Recall Reviewed With Patient 12/13/2020   Do you typically eat breakfast? Yes   If you do eat breakfast, what types of food do you eat? Cereal and toast   Do you typically eat lunch? Yes   If you do eat lunch, what types of food do you typically eat?  work 3rd shift lunch/dinner, chicken, frozen veggies or pork chop   Do you typically eat supper? Yes   If you do eat supper, what types of food do you typically eat? 3rd shift dinner, chicken salad on crackers, granola bars, snack pack pudding, chips, pop   Do you typically eat snacks? Yes   If you do snack, what types of food do you typically eat? ice cream   Do you like vegetables?  Yes   Do you drink water?  Yes   How many glasses of juice do you drink in a typical day? 0   How many of glasses of milk do you drink in a typical day? 1   If you do drink milk, what type? 1%   How many 8oz glasses of sugar containing drinks such as Yordan-Aid/sweet tea do you drink in a day? 0   How many cans/bottles of sugar pop/soda/tea/sports drinks do you drink in a day? 6   How many cans/bottles of sugar pop/soda/tea/sports drinks do you drink in a day? 6   How often do you have a drink of alcohol? Never   shift work/chronic sleepiness contributing to weight gain. Protein intake focus encouraged during work hours to reduce simple starch intake.    Eating Habits Reviewed With Patient 12/13/2020   Eats Starches Everyday   Generally, my meals include foods like these: fried meats, brats, burgers,  french fries, pizza, cheese, chips, or ice cream. Almost Everyday   Eat fast food (like [employer], [employer], [employer]). A Few Times a Week   Buffet A Few Times a Week   Watches TV Never   Often skip meals, eat at random times, have no regular eating times. Almost Everyday   Grazes Once a Week   Eat extra snacks between meals. A Few Times a Week   Eat most of my food at the end of the day. Never   Eats at Night Never   Eat extra snacks to prevent or correct low blood sugar. Never   Eat to prevent acid reflux or stomach pain. Never   Worry about not having enough food to eat. Never   Have you been to the food shelf at least a few times this year? No   I eat when I am depressed. Never   I eat when I am stressed. Never   I eat when I am bored. Once a Week   I eat when I am anxious. Never   I eat when I am happy or as a reward. Never   I feel hungry all the time even if I just have eaten. Never   Feeling full is important to me. Everyday   I finish all the food on my plate even if I am already full. Everyday   I can't resist eating delicious food or walk past the good food/smell. Never   I eat/snack without noticing that I am eating. Never   I eat when I am preparing the meal. Never   I eat more than usual when I see others eating. Never   I have trouble not eating sweets, ice cream, cookies, or chips if they are around the house. Everyday   I think about food all day. Less Than Weekly   What foods, if any, do you crave? Sweets / Candy / Chocolate, Chips / Crackers, Cheese   Please list any other foods you crave. Ice Cream, pizza   looking to reduce celebratory foods that have become regular staples in his diet.  Portion control/fullness are important, consideration for Plenity/Saxenda.    Activity/Exercise History Reviewed With Patient 12/13/2020   How much of a typical 12 hour day do you spend sitting? Less Than Half the Day   How much of a typical 12 hour day do you spend lying down? Less Than Half the Day   How  much of a typical day do you spend walking/standing? Most of the Day   How many hours (not including work) do you spend on the TV/Video Games/Computer/Tablet/Phone? 2-3 Hours   How many times a week are you active for the purpose of exercise? Never   How many total minutes do you spend doing some activity for the purpose of exercising when you exercise? None   What keeps you from being more active? Pain, Shortness of Breath, Lack of Time, Too Tired, Unsure What To Do       PAST MEDICAL HISTORY:  Past Medical History:   Diagnosis Date     Anxiety      History of ETOH abuse        Work/Social History Reviewed With Patient 12/13/2020   My employment status is: Full-Time   My job is:    How much of your job is spent on the computer or phone? Less than 50%   How many hours do you spend commuting to work daily?  2 hours   What is your marital status?  / In a Relationship   If in a relationship, is your significant other overweight? Yes   Do you have children? Yes   If you have children, are they overweight? No   Who do you live with?  Wife   Are they supportive of your health goals? Yes   Who does the food shopping?  both myself and spouse       Mental Health History Reviewed With Patient 12/13/2020   Have you ever been physically or sexually abused? No   If yes, do you feel that the abuse is affecting your weight? N/A   If yes, would you like to talk to a counselor about the abuse? N/A   How often in the past 2 weeks have you felt little interest or pleasure in doing things? For Several Days   Over the past 2 weeks how often have you felt down, depressed, or hopeless? Not at all       Sleep History Reviewed With Patient 12/13/2020   How many hours do you sleep at night? 6   Do you think that you snore loudly or has anybody ever heard you snore loudly (louder than talking or so loud it can be heard behind a shut door)? Yes   Has anyone seen or heard you stop breathing during your sleep? Yes   Do you  "often feel tired, fatigued, or sleepy during the day? Yes   Do you have a TV/Computer or other screens in your bedroom? Yes   diagnosed w/ ANTHONY previously, AHI of 13 and oral appliance recommended at that time, he reports hasn't been using since COVID and likely won't be able to use for awhile when teeth are pulled..    MEDICATIONS:   Current Outpatient Medications   Medication Sig Dispense Refill     cholecalciferol, vitamin D3, 1,000 unit tablet Take 1,000 Units by mouth daily.       loratadine (CLARITIN) 10 mg tablet TAKE 1 TABLET BY MOUTH EVERY DAY 90 tablet 1     multivitamin therapeutic (THERAGRAN) tablet Take 1 tablet by mouth daily.       sildenafil (VIAGRA) 50 MG tablet TAKE 1/2 TO 1 TABLET BY MOUTH 1 HOUR PRIOR TO SEX. 30 tablet 1     No current facility-administered medications for this visit.        ALLERGIES:   No Known Allergies    PHYSICAL EXAM:  .barvit   Height: 5' 7\" (1.702 m) (12/16/2020 10:00 AM)  Initial Weight: 249 lbs (12/16/2020 10:00 AM)  Weight: (!) 249 lb (112.9 kg) (12/16/2020 10:00 AM)  Weight loss from initial: 0 (12/16/2020 10:00 AM)  % Weight loss: 0 % (12/16/2020 10:00 AM)  BMI (Calculated): 39 (12/16/2020 10:00 AM)  SpO2: 93 % (2/11/2020  8:03 AM)    Normal speech. No cough. Sounds fatigued.    FOLLOW-UP:    4 weeks.    TIME: 60 min spent on evaluation, management, counseling, education, & motivational interviewing with greater than 50 % of the total time was spent on counseling and coordinating care    Sincerely,    Landen Shepherd MD        Video-Visit Details    Type of service:  phone consultation    Video End Time (time video stopped): 11:29 AM  Originating Location (pt. Location): Home    Distant Location (provider location):  [facility] SURGERY [facility] BARIATR[facility]     Platform used for Video Visit: Lizandro Odom RN  "

## 2021-06-13 NOTE — PATIENT INSTRUCTIONS - HE
"Plan:  1. Welcome to your weight loss season. To start, aim to get your first meal in within 2 hours of waking, reduce soda consumption of artifical sweetener to no more than once daily, less is fine. Hydrate well with water 64-80 oz per day. Unsweetened tea/coffee is OK in moderation or in herbal/decaf forms.  Aim for 30g of lean protein at 3 meals daily to start (see below for examples). Meals should be about 4-5 hours apart and if longer then intentional protein snack/complex carb combo should occur to bridge to your next meal (size of 150-220kcal per snack and at least 10g of lean protein).     2. Trial of topamax: start one tablet daily and if tolerating, increase to two tablets daily for now (50mg). See below.    3. Follow up as planned with dietician.    4. Start strengthening program/home video or walking regimen at least for 10 minutes 3-4 days weekly. Aiming to ramp up activity over the next 3-4 months to allow 150minutes/week or more to optimize general health and weight maintenance going forward.    5. Labs can be done anytime.    6. Continue to treat sleep apnea.        What makes a person succeed with dramatic and sustained weight loss?    It's being at the right point in your life where you feel the need to lose the weight, not because anyone told you so but because of a voice inside of you that says, \"I am ready for this\".  You're now at a point where you may be feeling anxious, irritable and when you look in the mirror you do not recognize the person looking back.  Your healthy self is buried somewhere in that reflexion and you want to free it again.  This is the sort of motivation that leads to success, and it comes from you.    Because the only person that can lose that extra weight is you, I like my patients to focus on the mindset of being in Weight Loss Season.  This gives you permission to make the changes necessary to be consistent with the diet/activity and behavior changes that lead to " "successful, healthy weight loss.  Nearly any diet plan can work for weight loss, but keeping it healthy and nutrient based to prevent deficiencies/hair loss/fatigue or irritability is vital.  If you have a plan you want to try, we'll work with you to make sure no adjustments are needed to keep you healthy through your weight loss season and working with our Bariatric Dieticians you'll be given expert guidance to customize your diet plan to suit your particular needs. If you don't have your own ideas in mind, we are always happy to suggest well researched and validated plans that provide enough food to prevent hunger but still tap into your excess fat reserves and lose weight in a sustainable fashion.  There is great evidence that lean protein/healthy fat intake with good fiber intake while minimizing simple starches/carbs produces reliable/sustainable weight loss in most people. But some feel more connected to an intermittent fasting/fast mimicking or ketogenic diet.  These protocols can be hard for many to stick with and that's why we prefer the protein/healthy fat focused diets but if these alternative strategies appeal to you, we can work with you to optimize your knowledge and results with these tools.    Losing weight is a temporary commitment, but you need to be \"All In\" to have a good weight loss season.  To avoid frustration, you have to be willing to be on track 19 out of 20 days or even better than that. But, weight loss season is generally only 4-8 months in length. After that length of time, it can be hard to maintain a negative calorie balance and our brain, motivations and metabolism will usually bring you to a plateau that cannot be broken in this modern world where other commitments start to take priority. That's when we look to stabilize the weight loss you've achieved.  If you've reached your goal by that time, fantastic, and job well done.  If there is more to go, then after a few months of " "stabilization, we can usually attack that previous plateau and break into new territory.    Because of this time limitation, we want to really get to work right away and get into a sustainable routine ASAP.  One of the best predictors of how much weight you're going to lose throughout the season is how much you lose in the first 6 weeks, so prepare well and jump in with both feet.      Occasionally, people may feel like they cannot commit fully to the changes necessary and may want to change one thing at a time and \"get used to\" the idea of losing weight.  That is OK because that is where they are in their life, and they cannot fully commit for any number of reasons.  It's part of that internal motivation and they just haven't reached PRIORTY NUMBER ONE status yet. It's possible that what they need is more time to reach that point and I am always willing to work with people that want to \"dabble\", but understand, the amount of success obtained with half measures, is much less than half results. Behavior Change cannot occur until we prepare our minds, bodies and environment for what is to come, action!    As you go through your plan, look for things to keep your motivation rolling.  The most successful people have a goal or target/reward that they are working towards.  Having a reward that celebrates your new fitness, mobility and energy is the best sort because it will encourage you to do well with the weight maintenance phase and long term lifestyle changes that promotes keeping the weight off for the long term.  Usually, \"getting healthier\" or improving blood tests or losing weight so your clothes fit better is not as internally motivating as having a tangible reward.  A good weight loss season reward is one that isn't food based, is affordable, but something special:  Something you won't be getting/doing unless you achieve your goal.   It s important to keep to the rule of success:  in order to get the reward, " your goal MUST be achieved. Write this reward down, where you can look at it daily and keep it in the front of your mind as you go through your weight loss season and it will help keep you on track.    Tools that help change behavior are vital for success. The most studied and most supported tool for weight loss is nothing more than writing down your food and weight every day.  Every Day.  Accurately and completely.  When you commit to weight loss season, this information tells you whether you're getting ENOUGH food to fuel your weight loss properly as well as teaches you the interaction between different foods you eat and how your body responds with weight loss.  You'll see that sometimes after a heavy workout you don't see the scale move until 2-3 days later.  How saltier meals (chili for example) may make you retain water for 4-5 days before you see the weight come off, you'll get used to the mini-plateaus that develop after a good 3-8 lb drop in weight as well as how you break through if you keep working the diet as you should.    Weight loss is not a linear process, there are mini ups and downs.  Learning how your body loses weight and getting comfortable with that is very rewarding. The act of writing words on paper also solidifies your will power and commitment to the season of weight loss and that by itself changes your brain chemistry/appetite, motivation and prepares you for maximal success.     Behavior change is all about getting into a new routine.  The old habits and routine have to change because without changing the circumstances of how you gained your weight, it's unlikely you'll enjoy satisfying results. If you have snacking habits, like every time you walk through the kitchen you grab a little something, well, that habit has to change and be replaced by a new habit.  It can be something as simple as keeping a doodle pad on the counter that you make a few scribbles and then walk through the kitchen  "having not opened the cupboard, or starting with a glass of water and leaving the kitchen without anything else, or checking your food journal to see how many calories you have left for the day.  Boredom is the enemy as are the old habits. Break new ground and try to push those old habits into a deep hole.  There are apps/counseling options available that can help with some of the day to day urges/behaviors if you're struggling. One commercial product that does a good job is [employer].  Unfortunately, there is a subscription fee.    Finally, exercise always helps.  While not mandatory to lose weight, every little bit helps and exercise has so many other benefits that to not work it into your plan is to miss out on all the mood, sleep, stress and general health benefits that come from making yourself a little short of breath and sweaty at least 3-4 days out of the week.  The metabolism and calorie burn benefits aside, almost every chronic ailment in medicine gets better with proper, aerobic exercise.  Allow yourself to start slow and let your body prepare itself to accept harder training 4-6 weeks down the road, but start now and commit to a plan.  Whether you have the means to hire a , join a gym or just walk out your front door or go down to your basement for a video workout, get into a exercise routine and  after 3 weeks of at least 3 times a week exercise you should be at a point where you can slowly start ramping up 10% each week to our goal of at least 150-300minutes weekly of aerobic exercise and at least twice weekly resistance training/strenghtening with weights/bands or body weight exercises.     I am a big fan of modifying the free training plan, \"Couch to 5k\", for almost all of my patients. Just type it into [employer] ([employer]) or look it up on your smart phone sree store.  To modify the plan,  you can use the training plan for whatever aerobic activity you do (bike/treadmill/elliptical/rower/pool/etc). During the " "\"jog\" intervals, you just move a little faster or harder or increase the tension or incline.  You use those little intervals to switch up the workout and recruit more muscles and pump the blood a little more and then recover again in the \"walk\" intervals by slowing down, decreasing the incline or turning down the tension.  3-4 days a week is not that much to ask and the benefits are enormous.  Start slow and develop the base from which you can then build on and reduce the risk of injury.  It's much more important 2-4 months from now to be enjoying your exercise then it is to over exert yourself at the start and hurt yourself.  Starting slowly allows your body to accept the training better down the road when the exercise becomes crucial for weight maintenance.  Without exercise down the road during your maintenance phase, all this hard work you are about to put in can be undone. It usually takes about 100-300 calories a day of exercise to maintain a weight loss and our focus during weight loss season is to generate the routine/activities and hobbies that make that enjoyable/sustainable.    Thanks for taking this first and most important step in your weight loss season.  Commit to it and we will cheerlead you all the way to success.  When things get tough or off track we'll offer guidance and analysis and when you reach your goal we'll celebrate your success.  In the end, it is all about your success, your health and what you do with it.      Landen Shepherd MD  [facility]  [phone]      Example Meal Plan for a 0228-3458 Calorie Diet: Raheem, for you increase portions by 30% about 1800kcal/day is a good goal for now.  90-110g of protein daily and 64-80 oz of water.    In order to fuel your weight loss properly and avoid hunger-induced overeating later in the day, for your height and weight, you will enjoy the most success by following the diet below or similar with adjustments based " on your particular tastes and preferences.  Exercise may influence speed, amount of weight loss further.     I recommend getting into a meal routine and keeping it similar day to day in the beginning so you don t have to think too hard about what you re going to make/eat.  Keep snacks healthy, ideally containing protein and some vegetables.  Non-processed food is preferable to packaged items.  Eat at least a few crunchy green vegetables if having a snack, which should be 2-3 hours after your mealtimes(prepare these ahead of time for ease of use).  Drink 64 oz -80 oz of water daily for most, some of you will need more and we'll discuss it at your visit if that is the case.  When changing our diet and reducing our intake,  we can often mistake thirst for hunger or just have some grazing habits we have to break ('bored/mindless eating') and a glass of water and reconsideration of our hunger is often all that is needed.  Having the urge is not the problem, but watching it pass by without acting on it is the goal.    If you re having hunger problems, add a protein drink/snack to your morning hours or afternoon snack with at least 20grams of protein and not too much sugar (under 10g).  A carton of higher protein/low sugar yogurt can work as well.  If the urge to snack is overwhelming and not satiated, try going for a 10 minute walk/exercise, come home and drink a glass of water and if still hungry, have a  calorie snack (handful of raw/sprouted nuts, veggies and string cheese, protein bar, etc).  Savor it.    It is better to have a large breakfast, a moderate lunch and a smaller dinner to fuel your day.  People lose 10-15% more weight during their weight loss season with this strategy. Optimizing your protein intake at each meal will further keep you more satisfied while eating less food overall.  Getting exercise in early has also been shown to offer the best results (before breakfast ideally but anytime is the  right time to exercise if that is not an option for you).    To make sure you re getting adequate vitamins and minerals during weight loss, I recommend one complete multivitamin a day of your choice.  Consider a probiotic and taking some vitamin D 2000 IU daily.    Let supper be your last meal of the day and ideally try to have at least 12 hours between supper and breakfast the next day to tap into some beneficial overnight fasting dynamics.  Water in the evening is fine, unsweetened, non caffeinated herbal tea is helpful as well.  Consolidating your meals within a 8-12 hour period of your day will help tap into these additional metabolic benefits and tends to keep your appetite up for breakfast, further helping to stay on track.  For most of my patients I don't recommend an intermittent fasting style diet (many find it hard to fit in their lifestyle) but an overnight fast is very doable for most patients and helps regulate our hunger drives a little better.  This makes it very important to nail good intake at all three meals to feel satisfied/energized and still lose weight.  If evening snacking desires are high, consider a glass of fiber supplement for some additional fullness (metamucil or similar). Most of us don't get the 25-30 grams per day of fiber that promotes good gut health/satiety.  Benefiber, metamucil, citrucel are reasonable/affordable options for most people.  Inulin, chicory, psyllium husk are reasonable options but start slow and low in the dose to avoid gas/bloating until your gut gets acclimated (ramping up to 5-10 grams per day of supplemental fiber after 3-4 weeks if needed).      Example Meal Plan:  Breakfast: 450-475 Calories  1 egg cooked on low in olive oil:   calories.  5oz Greek Yogurt (Fage plain classic: ~150 [place]ston)  Handful of Berries of your choice (about a calorie per berry or 20-40cal per handful)    cup(cooked) of  old fashioned oatmeal or 1/2 cup(cooked) steel cut oats. (150  winston)  Sprinkle amount of brown sugar and a pat of butter. (40 winston)  Glass of  Water  Black coffee or unsweetened Tea (0calories).      2-3 hours Later Snack: (195 calories).  Glass of water  One string Cheese (80 calories) or 4 oz creamed cottage cheese (115 calories) with  Crunchy Celery sticks (less than 10 calories per large stalk) 2 stalks. (20 calories)    of a  Large Banana or   of a Large Apple (60 calories):  eat second half at lunch or afternoon snack.     Lunch:300 -350 calories   Chicken Breast  (baked/broiled/roasted/grilled)  4-6 oz.  (125-180 winston), BBQ sauce/hot sauce/mustard/seasoning is free. Just use a reasonable amount. Or a can of tuna with 1 tablespoon mayonnaise.  Salad: lettuce, any other veggies (cucumbers, green peppers/celery you like and a small drizzle of dressing to just flavor.  Go as big on the veggies as you like,  as they are practically calorie free.   A whole, 8 inch cucumber is 45 calories, a whole green pepper is 23 calories, a stalk of celery is 9 calories.  [place] Dressing is 60 calories per tablespoon..so moderate your desired dressing or do a drizzle of olive oil and splash of balsamic vinegar on top,  Total calories unlikely to be over 150 even with dressing.  Glass of Water.    Option for lunch is meal replacement protein drink/smoothie.  Need at least 20 grams of protein and eat the rest of your apple/banana from the morning snack.      Afternoon Snack: 150-200 calories   Cheese Stick or cottage cheese again  and a fresh fruit OR  Granola Bar (protein Bar acceptable if under 200 calories OR  Homemade smoothies:  8oz skim milk,  a handful of berries (fresh or frozen and a serving of protein powder such as BiPro or Maura sWhey for example.  If you don't like dairy, make with 8oz water, one small banana, handful of berries and the protein powder, add any veggies you want as well:  roughly 200 calories.   Glass of Water    Dinner: 325 calories  4oz of fresh, Atlantic  salmon.  Broiled (salt/pepper/dill) for about 8-8.5 minutes (200calories) or  4oz filet mignon steak or sirloin steak  Salad or vegetable sautéed lightly in olive oil or   Broccoli 1.5  cups chopped and steamed  or micro-waved in a little water (75 calories)  Glass of Water,    Cup of herbal tea (unsweetened, caffeine free)      Herbs and seasonings are encouraged to flavor your foods/vegetables.  Make your food delicious.      Tips for Success:  1.  Prepare proteins ahead of time (broil chicken breasts in bulk so you can grab and go), steel cut oats/lentils can be stored in casserole dish/bowl in the fridge for quick scoop in the morning and rewarm in microwave, make use of crock pot recipes (watch salt content).  Making meals that cover 3-4 future meals is an easy way to stay on track.  2.  Drink a 8-12 oz glass of water every 2-3 hours when awake.  We often mistake hunger for thirst, especially when losing weight.  3. Remember your Reward and Motivation when things get hard.  4.  Weigh yourself every morning and record, you'll stay on track better and learn how our biorhythms, diet and elimination patterns show up on the scale. Don't worry about 1 or 2 day patterns, but when on track you'll notice good trend downward of weight over 3-4 day segments.  Plateaus tend to resolve after 4-8 days in most cases if you stay consistent with your plan.  These are natural and part of weight loss, even if you're perfect with your plan execution.  5. Call if problems/concerns.  [employer] is a great tool to stay in touch and provide weekly outside accountability. Check in with questions or if you want to brag.  6.  Find a handful of meals/foods that keep you on track and feeling good and get into a routine that is sustainable for you.  It's OK to have a routine that works for you.  7.  Consider taking a complete multivitamin just to make sure all micronutrients are adequate during weight loss.  8. If losing hair/brittle nails it  "usually means you are not taking enough protein.  Minimum goal is 60 grams daily of protein for smaller women, 80 grams a day for men. Consider taking Biotin as supplement or a \"Hair and Nail\" multivitamin.      LEAN PROTEIN SOURCES  Getting 20-30 grams of protein, 3 meals daily, is appropriate for most people, some need more but more than about 40 grams per meal is not useful.  General rule is drinking one ounce of water per gram of protein eaten over the course of the day:  70 grams of protein each day, drink 70 oz of water.  Protein Source Portion Calories Grams of Protein                           Nonfat, plain Greek yogurt    (10 grams sugar or less) 3/4 cup (6 oz)  12-17   Light Yogurt (10 grams sugar or less) 3/4 cup (6 oz)  6-8   Protein Shake 1 shake 110-180 15-30   Skim/1% Milk or lactose-free milk 1 cup ( 8 oz)  8   Plain or light, flavored soymilk 1 cup  7-8   Plain or light, hemp milk 1 cup 110 6   Fat Free or 1% Cottage Cheese 1/2 cup 90 15   Part skim ricotta cheese 1/2 cup 100 14   Part skim or reduced fat cheese slices 1 ounce 65-80 8     Mozzarella String Cheese 1 80 8   Canned tuna, chicken, crab or salmon  (canned in water)  1/2 cup 100 15-20   White fish (broiled, grilled, baked) 3 ounces 100 21   [place]/Tuna (broiled, grilled, baked) 3 ounces 150-180 21   Shrimp, Scallops, Lobster, Crab 3 ounces 100 21   Pork loin, Pork Tenderloin 3 ounces 150 21   Boneless, skinless chicken /turkey breast                          (broiled, grilled, baked) 3 ounces 120 21   [place], [place], [place], and Venison 3 ounces 120 21   Lean cuts of red meat and pork (sirloin,   round, tenderloin, flank, ground 93%-96%) 3 ounces 170 21   Lean or Extra Lean Ground Turkey 1/2 cup 150 20   90-95% Lean Candor Burger 1 rip 140-180 21   Low-fat casserole with lean meat 3/4 cup 200 17   Luncheon Meats                                                        (turkey, lean ham, roast beef, chicken) 3 ounces " 100 21   Egg (boiled, poached, scrambled) 1 Egg 60 7   Egg Substitute 1/2 cup 70 10   Nuts (limit to 1 serving per day)  3 Tbsp. 150 7   Nut Horace (peanut, almond)  Limit to 1 serving or less daily 1 Tbsp. 90 4   Soy Burger (varies) 1  15   Garbanzo, Black, Kirk Beans 1/2 cup 110 7   Refried Beans 1/2 cup 100 7   Kidney and Lima beans 1/2 cup 110 7   Tempeh 3 oz 175 18   Vegan crumbles 1/2 cup 100 14   Tofu 1/2 cup 110 14   Chili (beans and extra lean beef or turkey) 1 cup 200 23   Lentil Stew/Soup 1 cup 150 12   Black Bean Soup 1 cup 175 12         50 Things to do Instead of Snacking  We'll all have urges to snack sometimes. Hunger, mood, stress, boredom and distraction are common triggers so being mindful and thinking about what is driving a particular urge to snack at a particular time can be helpful for reducing the urge in the future.  Remember, the urge to snack doesn't have to be obeyed. The urge exists, but you can watch it pass. Over time, you will get good at the exercise of experiencing an urge, acknowledging it, but letting it pass you by and float away.  Until you get there, here are some activities to pass the 3-5 minutes that most urges last. Good hydration is always helpful.  If you do struggle with impulse/urge control, consider some therapy based on cognitive behavioral therapy or ACT (Acceptance and Commitment Therapy).  Apps/subscriptions like [employer] emphasize some of these tools and can be of help for those able to afford the sree/subscription.  1. Imagine the new healthier you   2. Walk around the block   3. Call a friend   4. Make a list of your Top Ten Reasons to Lose Weight   5. Make a To Do list   6. Turn on music and dance   7. Jot a thank you note to someone   8. Go to bed early or take a nap  9. Read a book   10. Blog or journal  11. Give yourself a manicure or pedicure   12. Plan a healthy meal for your family   13. Surf the Internet   14. Finish an unfinished project   15. Walk  your dog, pet your cat, feed your fish  16. Brush your teeth   17. Balance your checkbook   18. Say a prayer   19. Chop veggies for later use.  20. Give a massage   21. Clean out a junk drawer   22. Play a game with your kids   23. Try a new route on your walk     24. Drink a glass of water   25. Kiss someone   26. Try on some of your clothes   27. Look at old pictures   28. Rent a video   29. Wash your car   30. Take a hot, soothing bath   31. Update your calendar   32. Work in your yard   33. Start your holiday shopping list   34. Count your blessings   35. Write a letter   36. Fold some laundry   37. Check your e-mail   38. Give your dog a bath   39. Send a birthday card   40. Meditate   41. Hug someone   42. Rearrange some furniture   43. Light a fire or some candles   44. Put your pictures in an album   45. Plan a trip (real or imaginary)  46. Straighten a closet   47. Clean out a files   48. Visit a friend  49. Clean out your trunk  50. Do something nice for someone         Exercise Guidance    Nearly everything that bothers us gets better when the proper amount of exercise can be done in the proper amounts.  Getting to that level safely and without injury is the key.  When it comes to weight loss, exercise is especially important in maintaining the weight loss.  Unfortunately, one of the harsh realities is that substantial weight loss slows our metabolism, often anywhere from 5-20%.    Our brain always remembers our heaviest weight and we can return to that if we're not mindful and moving regularly.  Our biology doesn't understand the concept of having too much energy, only not having enough.  As such, when we lose weight, it's thought that the brain interprets this as we're ill or in a famine and dials back our metabolism to limit further weight loss.  This is why exercise is so important in keeping the weight off and is the main reason people have some weight regain from their low weight point after weight  loss.  We have to make up that 10-20% of calories not being burned.Since we can restrict our intake for only so long, exercise becomes very important in our long term healthy weigh maintenance to balance out the occasional indiscretion with our diet.    Generally, for every 5% body weight reduction in a weight loss season, a person needs to add  kilocalories of exercise in their daily routine to keep that weight off for the long term.  This is why it's vital to be starting your fitness regimen during weight loss season, so that routine is well established as you move into your maintenance period.    Additionally, all sorts of good enzymes and genes turn on with exercise and our stress, sleep, mood and bodies feel better when we can get to the point of making ourselves a little sweaty and short of breath 35-50 minutes most days of the week. But we have to start with what we can do first and give ourselves permission to work our way up to this goal.    Who isn't ready for exercise? Well, if you get severe dizziness/palpitations, chest pain or short of breath/faint with even minimal activity like walking across a room or you're having to pause while going up a flight of stairs, then getting your heart and/or lungs fully evaluated prior to starting an exercise regimen is recommended. Everyone else can probably start a program, but everyone may start at a different point:  Some can set a 5-10 minute walking goal and others will be able to ride their bike for an hour.      Start with where you're at and look to add 10% more each week until you're at that 150 minutes or more a week (or 75 minutes/week or more of vigorous exercise). Moderate exercise can be estimated as the pace you can carry on a conversation and vigorous is the pace at which you can get 3-5 words out before having to take a breath.  If you're using heart rate monitoring, Moderate is about 60% of your maximum heart rate and vigorous about 75%. (Max  "heart rate estimated as 220 beats minus your age:  Example: 220-age of 44 =176 Beats per minute (BPM) maximum. 0.6X 176= 105 BPM (moderate), 132 BMP(vigorous)).    If you like to count steps, the 10,000 steps per day does correlate well with weight maintenance but try to make at least 20-25% of those steps at a brisk pace (like you are about to miss your bus).    Finally, if you are pressed for time, it's important to know that some exercise is better than none.  High Intensity Interval training (HIIT) is a good way to get as much out of a short period of working out. If you can't walk, use the stairs, bike or swim; you could use a punching/arm workout regimen for your activity.  The idea with HIIT is to have a 3-6 minute warm up period of low intensity and the 3-6 \"intervals\" where you push the intensity up and then recover and start the next interval. One study showed that 3 intervals of 20 seconds at \"Maximum Effort\" while either biking on a stationary bike or going up stairs and then having 100 seconds recovery time before the next Maximum Effort was equally as beneficial on cardiovascular fitness development as doing 30 minutes of moderately paced walking 3 days weekly over a 6 week period of time.  So intensity matters. You just need to be able to safely do your desired exercise without injury. There are many great HIIT exercises/routines out there. IF you're not doing much exercise currently, I recommend giving your self 2-3 weeks of moderate exercise, 3 days weekly minimum to get your bones/tendons/muscles used to exercise before going for High Intensity workouts.    If you like to use Apps on the phone, the couch to 5k sree and 7 minute workout apps are nice places to start if you are reasonably healthy.  There are hundreds of other options out there.  Consider viewing [employer] if gentler exercise/movement is desired. Videos on [employer] and chair yoga for seniors exist and are free. Check them out and let's " get that 3-4 days a week routine going.    Let's move!  Landen Shepherd MD.         On-the-Go Breakfast Ideas  As of 2015, the latest research shows what a huge impact eating breakfast has on losing weight and feeling your best. People lose more weight when they make breakfast their biggest meal of the day compared to Dinner, but even if you cannot go to that degree, getting a breakfast that has at least 20 grams of protein and even a moderate amount of fat is ideal for maintaining good energy through the day and limits overeating in the evening hours.  The following are some quick and easy suggestions for at least getting something of substance into your body in the morning.  Enjoy!    Eating breakfast within 90 minutes of waking up is an important part of taking care of your body.  After sleeping for hours, your body is in need of fuel.  An ideal breakfast is a combination of protein, whole-grain carbohydrates, or fruit.  Here s why:    -Protein digests very slowly in the body, helping you feel more satisfied.  -Whole grains provide dietary fiber, which also digests slowly and helps keep your gut clean.  -Fruit is a great source of vitamins, minerals, and fiber.      Each one of these breakfast combinations has between 200-300 calories and 15-20 grams of protein.  Feel free to mix and match!    Protein: Choose  -1/2 cup low-fat cottage cheese  -2 hard boiled eggs , or one cooked in olive oil (low/slow heat).  -1 low fat string cheese stick  -1 Tablespoon natural peanut butter  -Invenshure vegetarian sausage rip (found in freezer section)  -1 slice lowfat cheese  -6 oz 2% or lowfat Greek yogurt, such as Fage or Oikos.    PLUS    Whole Grains:  Choose   -1 whole wheat English muffin  -1 whole wheat juliane, half  -1/2 Fiber One frozen muffin, thawed  -1/2 Fiber One toaster pastry  -1 whole wheat bagel thin  -1/2 cup Kashi cereal  -1 Kashi waffle (or other whole grain high-fiber waffle)    OR    Fruit:  Choose  -1/3 cup blueberries  -1/2 banana (or a plantain- similar to a banana, yet smaller)  -1/2 cup cantaloupe cubes  -1 small apple  -1 small orange  -1/2 cup strawberries    *Adapted from [employer], Fall 20    Ten Breakfasts Under 250 calories    Ideally, getting between 350-600 calories  (depending on starting height and weight)for breakfast is ideal for avoiding hunger later in the day, adjust/add to the following accordingly:    One- 250 calories, 8.5 g protein  1 slice whole-grain toast   1 Tbsp peanut butter    banana    Two- 250 calories, 8 g protein    cup nonfat/lowfat yogurt  1/3rd cup diced no-sugar peaches  1/3rd cup cereal (like Special K, Cheerios, or bran flakes)    Three- 250 calories, 25 g protein  1 egg scrambled with 1 oz skim milk    cup shredded cheddar    whole grain English muffin  1 oz Douglas inman  1 tsp margarine spread    Four- 225 calories, 25 g protein  1/2 cup Kashi Go-Lean cereal    cup skim milk mixed with 1 scoop Bariatric Advantage protein powder    cup no-sugar diced pears    Five- 250 calories, 20 g protein    cup oatmeal prepared with skim milk, 1 scoop protein powder, and sugar-free maple syrup    Six- 200 calories, 5 g protein  1 whole grain waffle, toasted  1 tablespoon creamy peanut or almond butter    Seven-  250 calories, 19 g protein  Breakfast sandwich: 1 slice whole grain toast, cut in half.  Add 1 scrambled egg and one slice cheddar  cheese.    Eight-  250 calories, 15 g protein  2 eggs scrambled with 1/3 cup frozen spinach (heat before adding to eggs) and 2 tablespoons low fat cream cheese.    Nine-  150 calories, 15 g protein  2/3rd cup cottage cheese    cup cantaloupe    Ten- 200 calories, 20 g protein  Fruit smoothie made with 4 oz. nonfat Greek yogurt,   cup berries, 1 scoop protein powder, and 4 oz skim milk.    Ten Lunches Under 250 Calories    Aim for lunch to be around 300-400 calories a day when trying to lose weight and get that protein in!    One-  200 calories, 11 g protein  1/3 cup tuna salad made with light bocanegra on 1 slice whole grain bread  1 small peeled apple    Two- 250 calories, 16 g protein  1/3 cup lowfat cottage cheese    cup cooked green beans    small fruit cocktail (in natural juice)    Three- 200 calories, 11 g protein    grilled cheese sandwich on whole grain bread with lowfat cheese  2/3rd cup of tomato soup    Four- 250 calories, 22 g protein  Deli wrap: 1 oz sliced turkey, 1 oz sliced ham, 1 oz sliced chicken rolled up with 1 slice low-fat cheese  1 small orange    Five- 250 calories, 28 g protein  2/3rd cup chili with 1 oz shredded cheese  4 saltine crackers    Six- 250 calories, 22 g protein  1 cup fresh spinach with 2 oz chicken, 1/3rd cup mandarin oranges, and 2 tablespoons sliced almonds with 1 tablespoon light vinaigrette dressing    Seven- 200 calories, 11 g protein  1 Tbsp sugar-free preserves and 1 Tbsp peanut butter on 1 slice whole grain toast    cup nonfat/lowfat Greek yogurt    Eight- 250 calories, 18 g protein  1 small soft-shell chicken taco with 1 oz shredded cheese, lettuce, tomato, salsa, and 1 Tbsp light sour cream    cup black beans    Nine- 225 calories, 13 g protein  2 ounces baked chicken  1/4 cup mashed potatoes    cup green beans    Ten- 200 calories, 21 g protein  Deli juliane: 2 oz roast beef or other deli meat with 1 tsp Sanjay mayonnaise and sliced tomato, onion, and lettuce  1/3rd cup cottage cheese      Ten Dinners Under 300 calories    If you're eating a large breakfast and medium lunch, keep dinner small.  300-400 calories is ideal for most people depending on their caloric needs.    One- 300 calories, 12 g protein  1-inch thick slice of turkey meatloaf    cup baked butternut squash    Two- 200 calories, 9 g protein  Bread-less BLT: 3 slices turkey inman, sliced tomato, wrapped in a large lettuce leaf    cup peeled fruit    Three- 275 calories, 36 g protein  3 oz roasted chicken    cup cooked broccoli    cup  shredded cheddar cheese    cup unsweetened applesauce    Four- 200 calories, 25 g protein  3 oz baked tilapia  1/3rd cup cooked carrots    cup yogurt    Five- 250 calories, 20 g protein  Grilled ham  n  Swiss: spread 2 tsp light margarine on 1 slice of whole grain bread.  Cut bread in half, layer 2 oz deli ham with 1 piece of Swiss cheese and grill until cheese is melted.    cup cooked vegetables    Six- 250 calories, 18 g protein  Vegetarian cheeseburger: 1 Boca cheeseburger topped with lettuce, onion, tomato, and ketchup/mustard    cup sweet potato fries    Seven- 250 calories, 18 g protein  Pork pot roast: 2 oz roasted pork loin, 1/3rd cup roasted carrots,   medium potato, cooked with   cup gravy    Eight- 300 calories, 25 g protein  2 oz meatballs (about 2 small meatballs)    cup spaghetti sauce  1/2 piece toast topped with 1 tsp light margarine and topped with garlic powder, toasted in oven    Nine- 250 calories, 16 g protein  Mexican pizza: one 8  corn tortilla topped with 2 oz chicken,   cup salsa, 2 tablespoons black beans, 2 tablespoons shredded cheese.  Bake until cheese is melted.    Ten- 250 calories, 22 g protein  Shrimp stir-cameron: 3 oz cooked shrimp, 1/6th onion,   pepper,   cup chopped carrots sautéed in 1 tablespoon olive oil, topped with 2 tablespoons stir cameron sauce and a pinch of sesame seeds  MEDICATIONS FOR WEIGHT LOSS  There are several medications available to assist us in weight loss.  By themselves, without compliance to a change in diet and increase in movement/activity these medications are disappointing in their results. However, combined with a closely monitored program of diet change and exercise they can be very effective in controlling appetite and boosting initial weight loss.  All weight loss medications need continual re-evaluation for efficacy as their side effects and health benefits fail to be worthwhile if a person is not continuing to lose weight or in maintaining their healthy  weight.  Some weight loss medications are scheduled drugs, meaning there is at least a theoretical possibility for developing addiction to them but in practice this is rare.  We do anticipate coming off meds in the future after stabilization of weight loss is assurred.  Finally, a tolerance can develop and people s perceived efficacy of medication can diminish.  In communication with your physician, it may be appropriate to intermittently take a break from these medications and then restart again (few weeks off then restart again) if a plateau is reached that cannot be broken through.  Each person can respond to a medication differently and to be a good option for you, it will need to be affordable, effective and well tolerated with minimal side effects.    In most cases, weight loss progress after one month and three months will be obtained and if a patient is not reaching the satisfactory progress towards weight loss, the medications may be discontinued.  The thought is that if a person is taking a weight loss medication and not receiving the potential health benefit of that drug, the side effects are not worthwhile and use should be discontinued.  On the flip side, there are many people on some weight loss medications for years because it continues to be an effective tool in their weight management and they are tolerating the medication without any long-term side effects.  Each person's response and purpose will be evaluated.      PHENTERMINE (Adipex): approved in 1959 for appetite suppression.  It has stimulant effects and cannot be used with Ritalin, Concerta, or other stimulants.  Although it is not highly addictive, it's chemically related to amphetamines which are addictive.  Occasional dependence can develop, but rarely. The most common side effects are dry mouth, increased energy and concentration, increased pulse, and constipation.  You should not take phentermine if you have glaucoma, hyperthyroidism, or  uncontrolled/untreated hypertension or overly anxious. You should stop if dramatic mood swings, severe insomnia, palpations, chest pains, visual changes or if your Blood Pressure is consistently elevated or any time it's over 160/90.   It's ok to go off the med for a few weeks and restart if efficacy is wearing off.  $24-$30 for 90 tablets at [facility]. Females are required to have reliable birth control to reduce the risk birth defects/miscarriage.      TOPIRAMATE (Topamax): Anti-seizure medication, also used to prevent migraines and sometimes for mood stabilization.  Side effects include paresthesia, glaucoma, altered concentration, attention difficulties, memory and speech problems, metabolic acidosis, depression, increase in body temperature and decrease sweating, risk of kidney stones.  Do not take Topamax while taking Depakote as this can cause high ammonia levels.  You must have reliable birth control as Topamax can cause birth defects.  If prolonged use has occurred it should be tapered off slowly to avoid withdrawal issues.  Insurance usually covers Topiramate.  At higher doses, there may be some confusion/forgetfulness associated with this so we try to limit dose to under 75mg twice daily to reduce this risk. Often covered by insurance as it's used for many reasons.  Topamax will cause carbonated beverages to taste bad. A recheck of your kidney/electrolytes may occur within a few months of starting.    QSYMIA (Phentermine + Topamax):  See above information about phentermine and Topamax.  Most common side effects are paresthesia, dizziness, distortion of taste, insomnia, constipation, and dry mouth.  See above descriptions for the two individual agents.Females are required to have reliable birth control to reduce the risk birth defects/miscarriage.  $150-$220 per month      Liraglutide (Victoza/Saxenda):   Part of the family of Glucagon Like Peptide Agonists, liraglutide directly suppresses appetite  "and is often used by diabetic patients due to decrease liver production of glucose, thereby improving glucose levels.  It also slows how quickly the stomach empties. May be hard to get covered for non diabetics and is an injectable medication delivered via a injector pen. It can be very costly without insurance coverage (over $500/month).  Small risk for pancreatitis and dose should be held if increased mid abdominal pain/burning. It is not to be used if previous Multiple Endocrine Neoplasia. In rodents, may increase risk of thyroid tumors and not indicated for anyone with hx of medullary thyroid cancer as a result.  If changes in voice/swallowing should be discontinued. Reliable birth control required in women.    Contrave (Bupropion/Naltrexone).    Synergistic combination of a mild appetite suppressing anti-depressant (Bupropion) whose effects are increased due to interaction with Naltrexone.  Naltrexone may have some effects on craving and is often used in addiction medicine to help previous opiate addicts be less prone to relapse as it blocks the action of opiates. Should be stopped if any need for opiate pain medication, surgery or planned procedures where you'll be given sedation/anesthesia. If prolonged use recommend stepping down bupropion over 2-3 weeks to limit any risk of withdrawal issues. Side effects may include dry mouth, increased heart rate, mild elevation in Blood pressure;  dizziness, ringing in the ears, anxiety (typically due to bupropion), nausea, constipation, and some get fatigued with naltrexone.  About $210 on [facility] for 120 tabs of \"Contrave\", the brand name without insurance coverage. Generic Bupropion 75mg: $25 for 120 tabs, Naltrexone: $55 for 90 tabs without insurance coverage on [facility]. Cannot be used if pregnant/trying to conceive or breast feeding.      Plenity:   Recently available October 2020, by mail order pharmacy only, but expensive. $98/month.  2-3 capsules taken 20 " "minutes before 2 meals daily provides crystals that expand into a gel that provides a mechanical fullness. Gel mixes with your next meal and increases satisfaction by bulking the meal up to feel bigger than it truly is. Plenity is not absorbed and gets passed through the digestive tract and excreted in stools. May cause some bloating/gas/full feeling as it behaves like a fiber in many ways. Cost not available yet. FDA cleared in March of 2019 but not available in stores as of March of 2020. Clearance safety and efficacy data done under \"Gelesis\" name. Not appropriate for people with stomach or bowel motility issues as requires you to pass it through digestive tract. [email or web address] has more information.      Topiramate for weight loss:    Topiramate (Topamax) is used to prevent seizures and migraines. Although it's not currently FDA approved for weight loss, it has been used safely for a number of years to help people lose weight.    It seems to work on areas of the brain to quiet signals related to eating and decreases appetite and cravings.    Topiramate may make some people feel:  -less interested in eating between meals.  -think less about food/eating.  -easier to push the plate away.  -giving up soda pop easier (generally makes it taste bad).  -have more control with portions.    How to take it:    1. Start at bedtime: one tablet, 25mg, nightly for a week.  2. Increase to 2 tablets (50mg) week 2 each night.  3. Increase to 3 tablets (75mg) the 3rd week and stay on this dose until follow up if tolerated. Back off to next lower dose if not tolerating it. Discontinue if rash/mood swing/dizziness or if feeling unwell.    *Don't stop taking it if you don't think it's doing anything. It's effect can be subtle for some.    *If prolonged use for months, we recommend tapering down over several weeks to reduce the risk of withdrawal/seizures.    Do not take with sleeping pills.    Higher doses are associated with more " sedation/confusion/forgetfulness so we try to limit those side effects by keeping the dose to 75mg twice daily maximum in most cases.    Potential Side Effects:  The most common side effects are:  -Tingling in the hands/feet or face.  -change in concentration.  -attention difficulty  -depression.  -increased body temperature.  -decreased sweating.  -increase risk of kidney stones.  -feeling sleepy/sedated tends to improve after a short time of use.    How to Store Topiramate (Topamax):  -Store in closed container at room temp away from heat/moisture.  -Keep out of the reach of children and pets.      When should I call my medical weight management team?  Call right away if yo notice any of the followin. Memory/speech problems.  2. Confusion/word finding difficulty (about 10% risk).   3. Change in vision as some glaucoma sx may worsen.  4. Nausea/vomiting feeling unwell for unclear reasons.    Contact call center if questions:  [phone].    What should I know before taking this medication?    1. Topiramate works best when you help it work:   -Decrease temptations around the house.   -stay away from situations that may trigger you.

## 2021-06-13 NOTE — TELEPHONE ENCOUNTER
Patient video visit is not working. Please call the patient for a telehealth visit at [phone].

## 2021-06-13 NOTE — PROGRESS NOTES
"Raheem Jeong is a 50 y.o. male who is being evaluated via a billable telephone visit.      The patient has been notified of following:     \"This telephone visit will be conducted via a call between you and your physician/provider. We have found that certain health care needs can be provided without the need for a physical exam.  This service lets us provide the care you need with a short phone conversation.  If a prescription is necessary we can send it directly to your pharmacy.  If lab work is needed we can place an order for that and you can then stop by our lab to have the test done at a later time.    Telephone visits are billed at different rates depending on your insurance coverage. During this emergency period, for some insurers they may be billed the same as an in-person visit.  Please reach out to your insurance provider with any questions.    If during the course of the call the physician/provider feels a telephone visit is not appropriate, you will not be charged for this service.\"    Patient has given verbal consent to a Telephone visit? Yes    What phone number would you like to be contacted at? [phone]    Phone call duration: 25 minutes    Beatriz Orozco RD        Medical Weight Loss Initial Diet Evaluation  Assessment:  Raheem is presenting today for a new weight management nutrition consultation. Pt has had an initial appointment with Dr. Shepherd.  Weight loss medication: topamax.  Personal Goals: would like to get down to 200lb, waiting on dentures so is on a soft food diet   +large portions, works 3rd shift and eats fast  Anthropometrics:  Pt's Initial Weight: 249 lbs  Weight: (!) 249 lb (112.9 kg)  Weight loss from initial: 0  % Weight loss: 0 %    BMI: 39  Ideal body weight: 66.1 kg (145 lb 11.6 oz)  Adjusted ideal body weight: 84.8 kg (187 lb 0.6 oz)  Estimated RMR ([place] Jeor equation):  1952 kcals x 1.2 (sedentary) = 2342 kcals (for weight maintenance)    Recommended Protein Intake: "  grams of protein/day  Medical History:  Patient Active Problem List   Diagnosis     Adjustment disorder with depressed mood     SHIMON (generalized anxiety disorder)     ANTHONY (obstructive sleep apnea)     Primary erectile dysfunction     Obesity (BMI 35.0-39.9) with comorbidity (H)      HbA1c:  No results found for: HGBA1C  Nutrition History:   Food allergies/intolerances/cultural or religous food customs: No  Weight loss history: was down to 190lb about 3 years ago  Vitamins/Mineral Supplementation: MVI and vitamin D  Dietary Recall:  Wakes up 3pm  Eat dinner- pork chop or lemon pepper chicken with veggies and mashed potatoes and something to drink, sometimes having ice cream after  12:30a- tuna and crackers, small bag of chips  3:30a- snack pack of pudding, chips and granola bar  Home at 7a- 4 pieces of toast or yogurt depending on how hungry he is  +weekends- usually going out to eat 1-2 times, typically skips breakfast, frozen pizza  Beverages: diet caffeine free pepsi, will drink 10oz cappuccino during work breaks, gatorade zero, water- 3-4 bottles per day  Exercise: job is more sedentary 7/8 hours of the day and then sedentary at home   Nutrition Diagnosis (PES statement):   Overweight/Obesity (NC 3.3) related to overeating and poor lifestyle habits as evidenced by patient report of large portions, frequent snacking, lack of activity and BMI 39  Nutrition Intervention  1. Food and/or Nutrient Delivery   a. Placed emphasis on importance of developing a healthy meal routine, aiming for 3 meals a day and no snacks.  b. Discussed using a protein supplement as a meal replacement.  2. Nutrition Education   a. Discussed with patient how to build a meal: the importance of including a lean/low fat protein at each meal, include a source of vegetables at a minimum of lunch and dinner and limiting carbohydrate intake  b. Educated on sources of lean protein, portion sizes, the amount of grams found in each source.  Recommend patient to aim for 20-30g protein at each meal.  c. Educated on how to read a food label: keeping total fat <10g and sugar <10g per serving.  d. Discussed the importance of adequate hydration, with emphasis on drinking 64oz of water or zero calorie beverages per day.  3. Nutrition Counseling   a. Encouraged importance of developing routine exercise for health benefits and weight loss.  b. Discussed mindful eating techniques such as eating off smaller places/bowls, taking 20-30 minutes to eat in a calm/relaxed environment without distractions.    Goals established by patient:   1. Work on changing snacks during the day when able to eat more solid foods  2. Increase movement  Handouts provided:  Protein supplement  Protein sources  Assessment/Plan:    Pt will follow up in 1 month(s) with bariatrician and 2-3 month(s) with dietitian.     Beatriz Orozco RD

## 2021-06-14 NOTE — PROGRESS NOTES
"Raheem Jeong is a 50 y.o. male who is being evaluated via a billable telephone visit.      The patient has been notified of following:     \"This telephone visit will be conducted via a call between you and your physician/provider. We have found that certain health care needs can be provided without the need for a physical exam.  This service lets us provide the care you need with a short phone conversation.  If a prescription is necessary we can send it directly to your pharmacy.  If lab work is needed we can place an order for that and you can then stop by our lab to have the test done at a later time.    Telephone visits are billed at different rates depending on your insurance coverage. During this emergency period, for some insurers they may be billed the same as an in-person visit.  Please reach out to your insurance provider with any questions.    If during the course of the call the physician/provider feels a telephone visit is not appropriate, you will not be charged for this service.\"    Patient has given verbal consent to a Telephone visit? Yes    What phone number would you like to be contacted at? [phone]    Patient would like to receive their AVS by AVS Preference: Che.    Additional provider notes: NS follow up phone call from inatroductory visit on 12/16/20 at 249 lbs, BMI of 39. Started on topamax at the visit and today, reports weight down 12 lbs. Didn't tolerate topamax (moodiness). His labs are still not done and he plans to get those done soon. His dental work (multiple teeth were going to be pulled soon after our last visit) is following soft food diet still, surgery went well overall (taken out about a month ago, removed 8 teeth). .  Routine has changed diet, less overall intake. During his nightshift, eating more cottage cheese (at break times).  He was interested in a trial of Plenity and we discussed how to use/side effect profile and I'll go ahead and order a trial for " "him.  Plan:  1. Great work with increase protein and less snack foods at night. Continue working on good balance of protein/vegetable/whole grains for optimal consistency over the next few months. Follow up as planned with dietician.  2. Discontinue topamax due to moodiness while taking it.  3. You were interested in a trial of Plenity: I'll fax the Rx today and they should reach out about shipping to you in the next day or two. Take one \"pod\" (three capsules) with 16 oz of water with 2 meals daily to provide some mechanical satiety. See flow sheet below for getting/stopping shipments. I'll give 2 refills to start.  4. Labs can be done anytime.  5. Aiming for 3 days of exercise besides work duties, each week. More if fine but commit to 3.      Phone call duration: 20 minutes    SRINIVAS Marshall MD      "

## 2021-06-14 NOTE — PATIENT INSTRUCTIONS - HE
"Plan:  1. Great work with increase protein and less snack foods at night. Continue working on good balance of protein/vegetable/whole grains for optimal consistency over the next few months. Follow up as planned with dietician.  2. Discontinue topamax due to moodiness while taking it.  3. You were interested in a trial of Plenity: I'll fax the Rx today and they should reach out about shipping to you in the next day or two. Take one \"pod\" (three capsules) with 16 oz of water with 2 meals daily to provide some mechanical satiety. See flow sheet below for getting/stopping shipments. I'll give 2 refills to start.  4. Labs can be done anytime.  5. Aiming for 3 days of exercise besides work duties, each week. More if fine but commit to 3.        LEAN PROTEIN SOURCES  Getting 20-30 grams of protein, 3 meals daily, is appropriate for most people, some need more but more than about 40 grams per meal is not useful.  General rule is drinking one ounce of water per gram of protein eaten over the course of the day:  70 grams of protein each day, drink 70 oz of water.  Protein Source Portion Calories Grams of Protein                           Nonfat, plain Greek yogurt    (10 grams sugar or less) 3/4 cup (6 oz)  12-17   Light Yogurt (10 grams sugar or less) 3/4 cup (6 oz)  6-8   Protein Shake 1 shake 110-180 15-30   Skim/1% Milk or lactose-free milk 1 cup ( 8 oz)  8   Plain or light, flavored soymilk 1 cup  7-8   Plain or light, hemp milk 1 cup 110 6   Fat Free or 1% Cottage Cheese 1/2 cup 90 15   Part skim ricotta cheese 1/2 cup 100 14   Part skim or reduced fat cheese slices 1 ounce 65-80 8     Mozzarella String Cheese 1 80 8   Canned tuna, chicken, crab or salmon  (canned in water)  1/2 cup 100 15-20   White fish (broiled, grilled, baked) 3 ounces 100 21   [place]/Tuna (broiled, grilled, baked) 3 ounces 150-180 21   Shrimp, Scallops, Lobster, Crab 3 ounces 100 21   Pork loin, Pork Tenderloin 3 ounces 150 " 21   Boneless, skinless chicken /turkey breast                          (broiled, grilled, baked) 3 ounces 120 21   [place], [place], [place], and Venison 3 ounces 120 21   Lean cuts of red meat and pork (sirloin,   round, tenderloin, flank, ground 93%-96%) 3 ounces 170 21   Lean or Extra Lean Ground Turkey 1/2 cup 150 20   90-95% Lean [place]eyo Burger 1 rip 140-180 21   Low-fat casserole with lean meat 3/4 cup 200 17   Luncheon Meats                                                        (turkey, lean ham, roast beef, chicken) 3 ounces 100 21   Egg (boiled, poached, scrambled) 1 Egg 60 7   Egg Substitute 1/2 cup 70 10   Nuts (limit to 1 serving per day)  3 Tbsp. 150 7   Nut [place] (peanut, almond)  Limit to 1 serving or less daily 1 Tbsp. 90 4   Soy Burger (varies) 1  15   Garbanzo, Black, Kirk Beans 1/2 cup 110 7   Refried Beans 1/2 cup 100 7   Kidney and Lima beans 1/2 cup 110 7   Tempeh 3 oz 175 18   Vegan crumbles 1/2 cup 100 14   Tofu 1/2 cup 110 14   Chili (beans and extra lean beef or turkey) 1 cup 200 23   Lentil Stew/Soup 1 cup 150 12   Black Bean Soup 1 cup 175 12           Example Meal Plan for a 7076-6255 Calorie Diet:    In order to fuel your weight loss properly and avoid hunger-induced overeating later in the day, for your height and weight, you will enjoy the most success by following the diet below or similar with adjustments based on your particular tastes and preferences.  Exercise may influence speed, amount of weight loss further.     I recommend getting into a meal routine and keeping it similar day to day in the beginning so you don t have to think too hard about what you re going to make/eat.  Keep snacks healthy, ideally containing protein and some vegetables.  Non-processed food is preferable to packaged items.  Eat at least a few crunchy green vegetables if having a snack, which should be 2-3 hours after your mealtimes(prepare these ahead of time for ease of use).  Drink 64 oz -80 oz of  water daily for most, some of you will need more and we'll discuss it at your visit if that is the case.  When changing our diet and reducing our intake,  we can often mistake thirst for hunger or just have some grazing habits we have to break ('bored/mindless eating') and a glass of water and reconsideration of our hunger is often all that is needed.  Having the urge is not the problem, but watching it pass by without acting on it is the goal.    If you re having hunger problems, add a protein drink/snack to your morning hours or afternoon snack with at least 20grams of protein and not too much sugar (under 10g).  A carton of higher protein/low sugar yogurt can work as well.  If the urge to snack is overwhelming and not satiated, try going for a 10 minute walk/exercise, come home and drink a glass of water and if still hungry, have a  calorie snack (handful of raw/sprouted nuts, veggies and string cheese, protein bar, etc).  Savor it.    It is better to have a large breakfast, a moderate lunch and a smaller dinner to fuel your day.  People lose 10-15% more weight during their weight loss season with this strategy. Optimizing your protein intake at each meal will further keep you more satisfied while eating less food overall.  Getting exercise in early has also been shown to offer the best results (before breakfast ideally but anytime is the right time to exercise if that is not an option for you).    To make sure you re getting adequate vitamins and minerals during weight loss, I recommend one complete multivitamin a day of your choice.  Consider a probiotic and taking some vitamin D 2000 IU daily.    Let supper be your last meal of the day and ideally try to have at least 12 hours between supper and breakfast the next day to tap into some beneficial overnight fasting dynamics.  Water in the evening is fine, unsweetened, non caffeinated herbal tea is helpful as well.  Consolidating your meals within a 8-12  hour period of your day will help tap into these additional metabolic benefits and tends to keep your appetite up for breakfast, further helping to stay on track.  For most of my patients I don't recommend an intermittent fasting style diet (many find it hard to fit in their lifestyle) but an overnight fast is very doable for most patients and helps regulate our hunger drives a little better.  This makes it very important to nail good intake at all three meals to feel satisfied/energized and still lose weight.  If evening snacking desires are high, consider a glass of fiber supplement for some additional fullness (metamucil or similar). Most of us don't get the 25-30 grams per day of fiber that promotes good gut health/satiety.  Benefiber, metamucil, citrucel are reasonable/affordable options for most people.  Inulin, chicory, psyllium husk are reasonable options but start slow and low in the dose to avoid gas/bloating until your gut gets acclimated (ramping up to 5-10 grams per day of supplemental fiber after 3-4 weeks if needed).      Example Meal Plan:  Breakfast: 450-475 Calories  1 egg cooked on low in olive oil:   calories.  5oz Greek Yogurt ([place]age plain classic: ~150 [place])  Handful of Berries of your choice (about a calorie per berry or 20-40cal per handful)    cup(cooked) of  old fashioned oatmeal or 1/2 cup(cooked) steel cut oats. (150 [place])  Sprinkle amount of brown sugar and a pat of butter. (40 [place])  Glass of  Water  Black coffee or unsweetened Tea (0calories).      2-3 hours Later Snack: (195 calories).  Glass of water  One string Cheese (80 calories) or 4 oz creamed cottage cheese (115 calories) with  Crunchy Celery sticks (less than 10 calories per large stalk) 2 stalks. (20 calories)    of a  Large Banana or   of a Large Apple (60 calories):  eat second half at lunch or afternoon snack.     Lunch:300 -350 calories   Chicken Breast  (baked/broiled/roasted/grilled)  4-6 oz.  (125-180 [place]), BBQ  sauce/hot sauce/mustard/seasoning is free. Just use a reasonable amount. Or a can of tuna with 1 tablespoon mayonnaise.  Salad: lettuce, any other veggies (cucumbers, green peppers/celery you like and a small drizzle of dressing to just flavor.  Go as big on the veggies as you like,  as they are practically calorie free.   A whole, 8 inch cucumber is 45 calories, a whole green pepper is 23 calories, a stalk of celery is 9 calories.  [place] Dressing is 60 calories per tablespoon..so moderate your desired dressing or do a drizzle of olive oil and splash of balsamic vinegar on top,  Total calories unlikely to be over 150 even with dressing.  Glass of Water.    Option for lunch is meal replacement protein drink/smoothie.  Need at least 20 grams of protein and eat the rest of your apple/banana from the morning snack.      Afternoon Snack: 150-200 calories   Cheese Stick or cottage cheese again  and a fresh fruit OR  Granola Bar (protein Bar acceptable if under 200 calories OR  Homemade smoothies:  8oz skim milk,  a handful of berries (fresh or frozen and a serving of protein powder such as BiPro or Maura sWhey for example.  If you don't like dairy, make with 8oz water, one small banana, handful of berries and the protein powder, add any veggies you want as well:  roughly 200 calories.   Glass of Water    Dinner: 325 calories  4oz of fresh, [place] salmon.  Broiled (salt/pepper/dill) for about 8-8.5 minutes (200calories) or  4oz filet mignon steak or sirloin steak  Salad or vegetable sautéed lightly in olive oil or   Broccoli 1.5  cups chopped and steamed  or micro-waved in a little water (75 calories)  Glass of Water,    Cup of herbal tea (unsweetened, caffeine free)      Herbs and seasonings are encouraged to flavor your foods/vegetables.  Make your food delicious.      Tips for Success:  1.  Prepare proteins ahead of time (broil chicken breasts in bulk so you can grab and go), steel cut oats/lentils can be  "stored in casserole dish/bowl in the fridge for quick scoop in the morning and rewarm in microwave, make use of crock pot recipes (watch salt content).  Making meals that cover 3-4 future meals is an easy way to stay on track.  2.  Drink a 8-12 oz glass of water every 2-3 hours when awake.  We often mistake hunger for thirst, especially when losing weight.  3. Remember your Reward and Motivation when things get hard.  4.  Weigh yourself every morning and record, you'll stay on track better and learn how our biorhythms, diet and elimination patterns show up on the scale. Don't worry about 1 or 2 day patterns, but when on track you'll notice good trend downward of weight over 3-4 day segments.  Plateaus tend to resolve after 4-8 days in most cases if you stay consistent with your plan.  These are natural and part of weight loss, even if you're perfect with your plan execution.  5. Call if problems/concerns.  [employer] is a great tool to stay in touch and provide weekly outside accountability. Check in with questions or if you want to brag.  6.  Find a handful of meals/foods that keep you on track and feeling good and get into a routine that is sustainable for you.  It's OK to have a routine that works for you.  7.  Consider taking a complete multivitamin just to make sure all micronutrients are adequate during weight loss.  8. If losing hair/brittle nails it usually means you are not taking enough protein.  Minimum goal is 60 grams daily of protein for smaller women, 80 grams a day for men. Consider taking Biotin as supplement or a \"Hair and Nail\" multivitamin.    Process for obtaining Plenity as of Fall of 2020:    Currently, Plenity is only available through a specialty pharmacy and there are a few steps to go through before you can obtain the prescription. Here's the flowsheet provided to me by the supplier, so you can have the proper expectations for how to obtain/refill/cancel your Plenity.    Initial fill    Once " we have received the [employer] fax form from you with your patients' prescription it begins a journey through our workflow process as follows:      The pharmacy technician enters the prescription into our software system.      Our Pharmacists will review the prescription and reach out to you if there are any issues.      Your patient can expect to receive a notification that we have received their prescription within 24 to 48 hours after the prescription has been verified. We will send a text and/or an email to your patient containing a link. To ensure that there is no delay in processing the prescription you must provide an accurate/valid email and/or cell phone number to us via the Plenity fax form.       Please encourage your patient to respond to the text and/or email as soon as possible so that there is no delay in getting their prescription filled and shipped. The link will direct your patient to create an account, review the prescription order, and then continue through the checkout process by providing payment and shipping information.       If the electronic attempt(s) to reach your patient are unsuccessful we will manually call your patient and place their prescription order for them.     Please let your patients know they can contact us at [phone] or email us at [email or web address] if they have any questions or would need any further assistance.    Refills    Your patients will be enrolled in an automatic refill program for their prescription, but they have the option to opt out or be contacted before generating a refill order. We will send a text and/or email reminder letting them know that the refill is being processed and the amount that is being charge to the credit card we have on file. We strongly encourage patients to remain on the automatic refill program through the entirety of their prescription. This will ensure there is no lapse in medication coverage.    Cancellation    Patients can  cancel anytime by calling [phone] or emailing [email or web address]

## 2021-06-14 NOTE — PROGRESS NOTES
Assessment/Plan:     This is an essentially healthy 47-year-old man who presents with intermittent exertional chest pain, history of tobacco and alcohol abuse, and some polyuria and polydipsia.    For the chest pain, recommended patient cease exercising until stress echo can be performed.  This is ordered today.  Is not having chest pain during examination so I feel an EKG would be possibly inappropriately reassuring.    For his polyuria, polydipsia will obtain a fasting CMP, HbA1c will be ordered if glucose is high.  Rectal exam did not demonstrate any significant prostatic hypertrophy or nodularity, suspect his increased urinary frequency is not secondary to prostate dysfunction.    For his erectile dysfunction, recommended that patient determine if he would like to receive his medication here in the [place] or from [place].  He will send me a letter from a [place] pharmacy if he chooses to go that route, or he will let me know if he would like a directed his pharmacy.    For his family history of colon cancer, patient is unclear when the colon cancer was diagnosed in the family member.  He will find out and return to this information.  Will obtain a colonoscopy 10 years prior to that family members initial diagnosis or at age 50, which ever comes first.    Problem List Items Addressed This Visit     None      Visit Diagnoses     Healthcare maintenance    -  Primary    Relevant Orders    Comprehensive Metabolic Panel    Lipid [place] FASTING    Screening cholesterol level        Relevant Orders    Lipid [place] FASTING    Exertional chest pain        Relevant Orders    Echo Stress Exercise          AVS printed and given to patient.  Return to clinic in 4 weeks.    Total time spent with patient was 25 minutes with greater than 50% spent in face-to-face counseling regarding the above plan.    This note has been dictated using voice recognition software. Any grammatical or context distortions are  "unintentional and inherent to the the software.     Genesis Robertson MD  Family Medicine [facility]      Subjective:      Raheem Jeong is a 47 y.o. male who presents to clinic for establishment of care and physical.    Patient has several concerns today.    Patient experiences chest pain with exertion.  This been going on for several months now.  His wife is very into physical fitness and he notes that when he is at the peak of exercising he occasionally gets a stabbing pain that feels like \"someone is grabbing it\" just to the left of the sternum.  He only expresses this with exercising.  It is rated a 7 out of 10 in severity, and there is no radiation.  He expenses no nausea, lightheadedness, or diaphoresis.  Does have a history of tobacco use and alcohol abuse.    In addition patient is a family history of colon cancer.  He is unsure when they were diagnosed.  He is interested in knowing if he qualifies for a colonoscopy at this time.    Patient also complains of increased thirst and increased urination.  He also complains of erectile difficulty which comes and goes but is improved overall from his cessation from antidepressants.  He endorses no difficulty initiating urinary stream.  No pain with urination.    Past Medical History, Family History, and Social History reviewed.     Review of systems is as stated in HPI.  Patient endorses: chest pain, joint pain, sexual dysfunction, and increased urination  The remainder of the 10 system review is otherwise negative.    Objective:     /86  Pulse 66  Ht 5' 8\" (1.727 m)  Wt 209 lb (94.8 kg)  SpO2 98%  BMI 31.78 kg/m2 Body mass index is 31.78 kg/(m^2).    Gen: Alert, NAD, appears stated age, normal hygiene   Eyes: conjunctivae without injection, sclera clear, EOMI  ENT/mouth: nares clear, septum midline, absent rhinorrhea, throat without injection, neck is supple, no thyroid enlargement  CV: RRR, no murmur appreciated, pedal edema absent " bilaterally  Resp: CTAB, no wheezes, rales or ronchi  ABD: normoactive, non-tender to palpation, nondistended  MSK: grossly full range of motion in all joints, no obvious deformity  Neuro: CN II-XII grossly intact, no deficits in coordination  Psych: no apparent hallucinations or delusions, no pressured speech; alert, oriented x3  SKIN: dry and without lesions  Heme/lymph: no pallor, no active bleeding/bruising, no adenopathy appreciated  Rectal: good sphincter tone, normal sized prostate, no nodularity      Current Outpatient Prescriptions on File Prior to Visit   Medication Sig Dispense Refill     cholecalciferol, vitamin D3, 1,000 unit tablet Take 1,000 Units by mouth daily.       loratadine (CLARITIN) 10 mg tablet Take 10 mg by mouth daily.       multivitamin therapeutic (THERAGRAN) tablet Take 1 tablet by mouth daily.       albuterol (PROAIR HFA;PROVENTIL HFA;VENTOLIN HFA) 90 mcg/actuation inhaler Inhale 2 puffs every 4 (four) hours as needed for wheezing. 1 each 0     No current facility-administered medications on file prior to visit.

## 2021-06-15 NOTE — PROGRESS NOTES
"Raheem Jeong is a 50 y.o. male who is being evaluated via a billable telephone visit.      The patient has been notified of following:     \"This telephone visit will be conducted via a call between you and your physician/provider. We have found that certain health care needs can be provided without the need for a physical exam.  This service lets us provide the care you need with a short phone conversation.  If a prescription is necessary we can send it directly to your pharmacy.  If lab work is needed we can place an order for that and you can then stop by our lab to have the test done at a later time.    Telephone visits are billed at different rates depending on your insurance coverage. During this emergency period, for some insurers they may be billed the same as an in-person visit.  Please reach out to your insurance provider with any questions.    If during the course of the call the physician/provider feels a telephone visit is not appropriate, you will not be charged for this service.\"    Patient has given verbal consent to a Telephone visit? Yes    What phone number would you like to be contacted at? [phone]    Patient would like to receive their AVS by AVS Preference: Che.  Plan:  1. Finding consistency in routine/meal intake and protein consumption per meal should be most helpful for seeing those 1-2lbs per week come off. For your needs, aiming for 1500-1600kcal/day (no less than 1450kcal) and getting 3 meals daily with 450-500 kcal and 30-35g of lean protein should produce good weight loss.    2. Plenity timing for best effect is to take with 16 oz of water 20 minutes before 2nd and 3rd meal of the day. Consider setting a timer on your phone/watch.    3. Trial of Naltrexone for crave/appetite reduction.  Start HALF a tablet with last meal of the day for 10 days then increase to full tablet with last meal of the day if tolerated. If too strong you could try half tablet with 2 meals daily (first " and last meal of the day) instead. Stop Naltrexone if any injury/accident or need for procedure where you'd be sedated as Naltrexone is the antidote to opiates and makes them not work very well. We could consider adding bupropion to your regimen as well as the combination with naltrexone/bupropion is known as the appetite suppressant, Contrave (see below).     4. Recheck in 4-6 weeks on progress. Follow up with dietician as planned.  Additional provider notes:   NS follow up weight is plateau, may have lost only a couple of pounds.  Nightshift continued.  No finding the plenity as helpful as he'd hoped.   Missing doses of Plenity, forgets doses so not optimized yet.  Mild gas.   Phone call duration: 25 minutes  MD Laila Norton LPN

## 2021-06-15 NOTE — PROGRESS NOTES
Assessment/Plan:     Patient presents with symptoms consistent with pharyngitis but was recently tested and found to be strep negative.  His biggest concern is the discomfort with the sore throat and the swollen tonsil.  Will prescribe prednisone 20 mg for 5 days to help decrease the swelling.  Recommended symptomatic treatment for the sore throat itself.  No antibiotics prescribed today.  Recommended immediate presentation to the emergency department or calling 911 if patient has any symptoms of shortness of breath secondary to this swelling.     Problem List Items Addressed This Visit     None      Visit Diagnoses     Swelling of tonsil    -  Primary    Relevant Medications    predniSONE (DELTASONE) 20 MG tablet    Sore throat              AVS printed and given to patient.  Return to clinic PRN.    Total time spent with patient was 15 minutes with greater than 50% spent in face-to-face counseling regarding the above plan.    This note has been dictated using voice recognition software. Any grammatical or context distortions are unintentional and inherent to the the software.     Genesis Robertson MD  Family Medicine [facility]      Subjective:      Raheem Jeong is a 47 y.o. male who presents to clinic for enlarged tonsils.    [facility] for sore throat and tested negative for strep.       Patient has been experiencing symptoms for 4 days. It is worsening.  Patient endorses: diarrhea is improving, mild congestion, sore throat, SOB secondary to a feeling of throat closure  Patient denies: chest pain (technically patient has this but it is constant and has already been worked up previously per patient report), fever, ear pain, ear discharge, rash, rhinorrhea, post-nasal drip, sinus pressure, fatigue, muscle aches, cough  Treatment thus far has consisted of mucinex, vit C, advil, patient also takes claritin daily.  Sick contacts include coworkers.  Recent travel  "denied.      Past Medical History, Family History, and Social History reviewed.     Review of systems is as stated in HPI.  Patient endorses: hoarseness, diarrhea, enlarged lymph nodes  The remainder of the 10 system review is otherwise negative.    Objective:     /70  Pulse 82  Temp 97.6  F (36.4  C)  Ht 5' 8\" (1.727 m)  Wt 214 lb (97.1 kg)  SpO2 98%  BMI 32.54 kg/m2 Body mass index is 32.54 kg/(m^2).    Gen: Alert, NAD, appears stated age, normal hygiene   Eyes: conjunctivae without injection, sclera clear, EOMI  ENT/mouth: nares clear, septum midline, absent rhinorrhea, throat without injection, neck is supple, no thyroid enlargement; significant right-sided tonsillar edema with mild left-sided tonsillar edema no uvular deviation  CV: RRR, no murmur appreciated, pedal edema absent bilaterally  Resp: CTAB, no wheezes, rales or ronchi  ABD: normoactive, non-tender to palpation, nondistended  MSK: grossly full range of motion in all joints, no obvious deformity  Neuro: CN II-XII grossly intact, no deficits in coordination  Psych: no apparent hallucinations or delusions, no pressured speech; alert, oriented x3  SKIN: dry and without lesions  Heme/lymph: no pallor, no active bleeding/bruising, no adenopathy appreciated      Current Outpatient Prescriptions on File Prior to Visit   Medication Sig Dispense Refill     albuterol (PROAIR HFA;PROVENTIL HFA;VENTOLIN HFA) 90 mcg/actuation inhaler Inhale 2 puffs every 4 (four) hours as needed for wheezing. 1 each 0     cholecalciferol, vitamin D3, 1,000 unit tablet Take 1,000 Units by mouth daily.       loratadine (CLARITIN) 10 mg tablet Take 10 mg by mouth daily.       multivitamin therapeutic (THERAGRAN) tablet Take 1 tablet by mouth daily.       nitroglycerin (NITROSTAT) 0.3 MG SL tablet Place 1 tablet (0.3 mg total) under the tongue every 5 (five) minutes as needed for chest pain. 5 tablet 0     No current facility-administered medications on file prior to " visit.

## 2021-06-15 NOTE — PROGRESS NOTES
Attempt made to speak with patient via home phone without answer.  Please take to call patient later to day to inform him that CTA coronary was normal including CC score of zero.  There was no enlargement of aortic.  CT of hcest was also normal by radiologist.  Likely cause if pleural or musculoskeletal.  Recommend monitoring symptoms and no further cardiac testing.

## 2021-06-15 NOTE — PROGRESS NOTES
Assessment/Plan:     Patient continues to express exertional chest pain with the description that is concerning for a cardiac origin despite a negative stress echo.  Patient's chest pain was not reproducible on this examination, and thus I feel the reassuring results might be suspect.  Will refer to cardiology for further evaluation, as I have some genuine concern of an ischemic component.  Prescribed 5 tablets of nitroglycerin to be used sublingually as needed in case of chest pain.  Had an extensive discussion with patient that his blood pressure is already borderline low and that this medication would cause hypotension.  Recommended that if he needed to take this medication that he also needed to present to the emergency department.  Patient is overwhelmingly concerned with the financial aspects of this and is reluctant to present to the emergency department or seek additional care.  I have genuine concerns that the patient's cardiac status might not be accurately represented in a stress test which did not induce is classic symptoms of chest pain, and will therefore refer to cardiology at this time.    Problem List Items Addressed This Visit     Other chest pain    Relevant Orders    Ambulatory referral to Cardiology          Total time spent with patient was 20 minutes with greater than 50% spent in face-to-face counseling regarding the above plan.    This note has been dictated using voice recognition software. Any grammatical or context distortions are unintentional and inherent to the the software.     Genesis Robertson MD  Family Medicine [facility]      Subjective:      Raheem Jeong is a 47 y.o. male who presents to clinic for chest pain.    Patient had been experiencing episodes of retrosternal chest pain only during exertion.  This was concerning enough that we sent him for a stress echocardiogram which resulted in no inducible ischemia.  He had a preserved ejection fraction and no changes on both  "the echo or the EKG.  However, after that appointment he began walking around the [facility] [place] and again experienced chest pain.  It feels as though something is \"squeezing his heart\".  He expenses shortness of breath during these episodes although he also attributes it to the exertion.  It lasts for the duration of the exercise and resolved several minutes post exercise.  It is about a 5/10.  It does not radiate.    Past Medical History, Family History, and Social History reviewed.     Review of systems is as stated in HPI.  The remainder of the 10 system review is otherwise negative.    Objective:     /78  Pulse 65  Ht 5' 8\" (1.727 m)  Wt 212 lb (96.2 kg)  SpO2 98%  BMI 32.23 kg/m2 Body mass index is 32.23 kg/(m^2).    Gen: Alert, NAD, appears stated age, normal hygiene   Eyes: conjunctivae without injection, sclera clear, EOMI  ENT/mouth: nares clear, septum midline, absent rhinorrhea, throat without injection, neck is supple, no thyroid enlargement  CV: RRR, no murmur appreciated, pedal edema absent bilaterally  Resp: CTAB, no wheezes, rales or ronchi  MSK: grossly full range of motion in all joints, no obvious deformity  Neuro: CN II-XII grossly intact, no deficits in coordination  Psych: no apparent hallucinations or delusions, no pressured speech; alert, oriented x3  SKIN: dry and without lesions  Heme/lymph: no pallor, no active bleeding/bruising, no adenopathy appreciated      Current Outpatient Prescriptions on File Prior to Visit   Medication Sig Dispense Refill     albuterol (PROAIR HFA;PROVENTIL HFA;VENTOLIN HFA) 90 mcg/actuation inhaler Inhale 2 puffs every 4 (four) hours as needed for wheezing. 1 each 0     cholecalciferol, vitamin D3, 1,000 unit tablet Take 1,000 Units by mouth daily.       loratadine (CLARITIN) 10 mg tablet Take 10 mg by mouth daily.       multivitamin therapeutic (THERAGRAN) tablet Take 1 tablet by mouth daily.       No current facility-administered medications on " file prior to visit.

## 2021-06-15 NOTE — PATIENT INSTRUCTIONS - HE
Plan:  1. Finding consistency in routine/meal intake and protein consumption per meal should be most helpful for seeing those 1-2lbs per week come off. For your needs, aiming for 1500-1600kcal/day (no less than 1450kcal) and getting 3 meals daily with 450-500 kcal and 30-35g of lean protein should produce good weight loss.    2. Plenity timing for best effect is to take with 16 oz of water 20 minutes before 2nd and 3rd meal of the day. Consider setting a timer on your phone/watch.    3. Trial of Naltrexone for crave/appetite reduction.  Start HALF a tablet with last meal of the day for 10 days then increase to full tablet with last meal of the day if tolerated. If too strong you could try half tablet with 2 meals daily (first and last meal of the day) instead. Stop Naltrexone if any injury/accident or need for procedure where you'd be sedated as Naltrexone is the antidote to opiates and makes them not work very well. We could consider adding bupropion to your regimen as well as the combination with naltrexone/bupropion is known as the appetite suppressant, Contrave (see below).     4. Recheck in 4-6 weeks on progress. Follow up with dietician as planned.        LEAN PROTEIN SOURCES  Getting 20-30 grams of protein, 3 meals daily, is appropriate for most people, some need more but more than about 40 grams per meal is not useful.  General rule is drinking one ounce of water per gram of protein eaten over the course of the day:  70 grams of protein each day, drink 70 oz of water.  Protein Source Portion Calories Grams of Protein                           Nonfat, plain Greek yogurt    (10 grams sugar or less) 3/4 cup (6 oz)  12-17   Light Yogurt (10 grams sugar or less) 3/4 cup (6 oz)  6-8   Protein Shake 1 shake 110-180 15-30   Skim/1% Milk or lactose-free milk 1 cup ( 8 oz)  8   Plain or light, flavored soymilk 1 cup  7-8   Plain or light, hemp milk 1 cup 110 6   Fat Free or 1% Cottage Cheese  1/2 cup 90 15   Part skim ricotta cheese 1/2 cup 100 14   Part skim or reduced fat cheese slices 1 ounce 65-80 8     Mozzarella String Cheese 1 80 8   Canned tuna, chicken, crab or salmon  (canned in water)  1/2 cup 100 15-20   White fish (broiled, grilled, baked) 3 ounces 100 21   [place]/Tuna (broiled, grilled, baked) 3 ounces 150-180 21   Shrimp, Scallops, Lobster, Crab 3 ounces 100 21   Pork loin, Pork Tenderloin 3 ounces 150 21   Boneless, skinless chicken /turkey breast                          (broiled, grilled, baked) 3 ounces 120 21   [place], [place], [place], and Venison 3 ounces 120 21   Lean cuts of red meat and pork (sirloin,   round, tenderloin, flank, ground 93%-96%) 3 ounces 170 21   Lean or Extra Lean Ground Turkey 1/2 cup 150 20   90-95% Lean [place] Burger 1 rip 140-180 21   Low-fat casserole with lean meat 3/4 cup 200 17   Luncheon Meats                                                        (turkey, lean ham, roast beef, chicken) 3 ounces 100 21   Egg (boiled, poached, scrambled) 1 Egg 60 7   Egg Substitute 1/2 cup 70 10   Nuts (limit to 1 serving per day)  3 Tbsp. 150 7   Nut [place] (peanut, almond)  Limit to 1 serving or less daily 1 Tbsp. 90 4   Soy Burger (varies) 1  15   Garbanzo, Black, [place] Beans 1/2 cup 110 7   Refried Beans 1/2 cup 100 7   Kidney and Lima beans 1/2 cup 110 7   Tempeh 3 oz 175 18   Vegan crumbles 1/2 cup 100 14   Tofu 1/2 cup 110 14   Chili (beans and extra lean beef or turkey) 1 cup 200 23   Lentil Stew/Soup 1 cup 150 12   Black Bean Soup 1 cup 175 12             MEDICATIONS FOR WEIGHT LOSS  There are several medications available to assist us in weight loss.  By themselves, without compliance to a change in diet and increase in movement/activity these medications are disappointing in their results. However, combined with a closely monitored program of diet change and exercise they can be very effective in controlling appetite and boosting initial weight loss.  All  weight loss medications need continual re-evaluation for efficacy as their side effects and health benefits fail to be worthwhile if a person is not continuing to lose weight or in maintaining their healthy weight.  Some weight loss medications are scheduled drugs, meaning there is at least a theoretical possibility for developing addiction to them but in practice this is rare.  We do anticipate coming off meds in the future after stabilization of weight loss is assurred.  Finally, a tolerance can develop and people s perceived efficacy of medication can diminish.  In communication with your physician, it may be appropriate to intermittently take a break from these medications and then restart again (few weeks off then restart again) if a plateau is reached that cannot be broken through.  Each person can respond to a medication differently and to be a good option for you, it will need to be affordable, effective and well tolerated with minimal side effects.    In most cases, weight loss progress after one month and three months will be obtained and if a patient is not reaching the satisfactory progress towards weight loss, the medications may be discontinued.  The thought is that if a person is taking a weight loss medication and not receiving the potential health benefit of that drug, the side effects are not worthwhile and use should be discontinued.  On the flip side, there are many people on some weight loss medications for years because it continues to be an effective tool in their weight management and they are tolerating the medication without any long-term side effects.  Each person's response and purpose will be evaluated.      PHENTERMINE (Adipex): approved in 1959 for appetite suppression.  It has stimulant effects and cannot be used with Ritalin, Concerta, or other stimulants.  Although it is not highly addictive, it's chemically related to amphetamines which are addictive.  Occasional dependence can  develop, but rarely. The most common side effects are dry mouth, increased energy and concentration, increased pulse, and constipation.  You should not take phentermine if you have glaucoma, hyperthyroidism, or uncontrolled/untreated hypertension or overly anxious. You should stop if dramatic mood swings, severe insomnia, palpations, chest pains, visual changes or if your Blood Pressure is consistently elevated or any time it's over 160/90.   It's ok to go off the med for a few weeks and restart if efficacy is wearing off.  $24-$30 for 90 tablets at [facility]. Females are required to have reliable birth control to reduce the risk birth defects/miscarriage.      TOPIRAMATE (Topamax): Anti-seizure medication, also used to prevent migraines and sometimes for mood stabilization.  Side effects include paresthesia, glaucoma, altered concentration, attention difficulties, memory and speech problems, metabolic acidosis, depression, increase in body temperature and decrease sweating, risk of kidney stones.  Do not take Topamax while taking Depakote as this can cause high ammonia levels.  You must have reliable birth control as Topamax can cause birth defects.  If prolonged use has occurred it should be tapered off slowly to avoid withdrawal issues.  Insurance usually covers Topiramate.  At higher doses, there may be some confusion/forgetfulness associated with this so we try to limit dose to under 75mg twice daily to reduce this risk. Often covered by insurance as it's used for many reasons.  Topamax will cause carbonated beverages to taste bad. A recheck of your kidney/electrolytes may occur within a few months of starting.    QSYMIA (Phentermine + Topamax):  See above information about phentermine and Topamax.  Most common side effects are paresthesia, dizziness, distortion of taste, insomnia, constipation, and dry mouth.  See above descriptions for the two individual agents.Females are required to have reliable  "birth control to reduce the risk birth defects/miscarriage.  $150-$220 per month      Liraglutide (Victoza/Saxenda):   Part of the family of Glucagon Like Peptide Agonists, liraglutide directly suppresses appetite and is often used by diabetic patients due to decrease liver production of glucose, thereby improving glucose levels.  It also slows how quickly the stomach empties. May be hard to get covered for non diabetics and is an injectable medication delivered via a injector pen. It can be very costly without insurance coverage (over $500/month).  Small risk for pancreatitis and dose should be held if increased mid abdominal pain/burning. It is not to be used if previous Multiple Endocrine Neoplasia. In rodents, may increase risk of thyroid tumors and not indicated for anyone with hx of medullary thyroid cancer as a result.  If changes in voice/swallowing should be discontinued. Reliable birth control required in women.    Contrave (Bupropion/Naltrexone).    Synergistic combination of a mild appetite suppressing anti-depressant (Bupropion) whose effects are increased due to interaction with Naltrexone.  Naltrexone may have some effects on craving and is often used in addiction medicine to help previous opiate addicts be less prone to relapse as it blocks the action of opiates. Should be stopped if any need for opiate pain medication, surgery or planned procedures where you'll be given sedation/anesthesia. If prolonged use recommend stepping down bupropion over 2-3 weeks to limit any risk of withdrawal issues. Side effects may include dry mouth, increased heart rate, mild elevation in Blood pressure;  dizziness, ringing in the ears, anxiety (typically due to bupropion), nausea, constipation, and some get fatigued with naltrexone.  About $210 on [facility] for 120 tabs of \"Contrave\", the brand name without insurance coverage. Generic Bupropion 75mg: $25 for 120 tabs, Naltrexone: $55 for 90 tabs without insurance " "coverage on [facility]. Cannot be used if pregnant/trying to conceive or breast feeding.      Plenity:   Recently available October 2020, by mail order pharmacy only, but expensive. $98/month.  2-3 capsules taken 20 minutes before 2 meals daily provides crystals that expand into a gel that provides a mechanical fullness. Gel mixes with your next meal and increases satisfaction by bulking the meal up to feel bigger than it truly is. Plenity is not absorbed and gets passed through the digestive tract and excreted in stools. May cause some bloating/gas/full feeling as it behaves like a fiber in many ways. Cost not available yet. FDA cleared in March of 2019 but not available in stores as of March of 2020. Clearance safety and efficacy data done under \"Gelesis\" name. Not appropriate for people with stomach or bowel motility issues as requires you to pass it through digestive tract. [email or web address].com has more information.      Bupropion/Naltrexone Patient Information (trade name, CONTRAVE)    This medication is used for weight loss.  In studies people lost an averaged 5-8% of their starting weight compared to placebo (0-1%).    Often, this medication will be written as 2 separate prescriptions to improve cost to the patient. The trade name drug CONTRAVE comes as a combination of 8mg naltrexone/90mg bupropion and a 3 week escalating dose regimen going from one tablet daily up to a max of 2 tabs twice daily:  Week 1: one tablet in the daytime.  Week 2: one tablet A.M.  And One tablet in the PM.  Week 3: 2 tabs AM  And One tablet in the PM.  Week 4: 2 tabs AM and 2 tabs PM.     The generic Rx I write for is as follows:  I recommend starting One 75 mg bupropion and 1/2 a tablet of naltrexone (25mg) daily for 10 days and then moving up to One 75 mg bupropion tablet twice daily and half a naltrexone tablet twice daily.  Depending on your results/tolerance, we may increase the Bupropion up to a maximum of 150 mg twice daily of the " immediate release medication or one Bupropion  mg-300 mg daily. Sometimes we'll have to go slower with the dose escalation.    Like any weight loss medication, showing results and having tolerable or no side effects is vital to continuing therapy and if either no significant weight loss after 8-12 weeks or too many side effects then we would discontinue therapy and look for alternative options/assistance.    AVOID taking with high fat meals as this increases bupropion levels, but some food in the stomach can help decrease nausea side effects from the Naltrexone.    People who take Metoprolol may need to decrease their dose of metoprolol as the bupropion increases the effect of metoprolol 2-4 fold in some cases and low blood pressure/low heart rate could occur.    Patients should STOP CONTRAVE if they have a severe allergic reaction to CONTRAVE.  Patients should be told that CONTRAVE should be discontinued and not restarted if they experience a seizure while on treatment.    Patients should be advised that the excessive use or abrupt discontinuation of alcohol,benzodiazepines, antiepileptic drugs, or sedatives/hypnotics can increase the risk of seizure.    Patients should be advised to minimize or avoid use of alcohol.    Patients should be advised that if they previously used opioids, they may be more sensitive to lower doses of opioids and at risk of accidental overdose should they use opioids after CONTRAVE treatment is discontinued or temporarily interrupted.  Patients should be advised that because naltrexone, a component of CONTRAVE, can block the effects of opioids, they will not perceive any effect if they attempt to self-administer anyopioid drug in small doses while on CONTRAVE. Further advise patients that the attempt to administer large doses of any opioid or to bypass the blockade while on CONTRAVE may lead to serious injury, coma, or death.    Patients should be off all opioids for a minimum of 7  to 10 days before starting CONTRAVE in order to avoid precipitation of withdrawal. Advise patients they should not take CONTRAVE if they have any symptoms of opioid withdrawal.   Patients should be advised to call their healthcare provider if they experience increased blood pressure or heart rate.  Patients should be advised to notify their healthcare provider if they are taking, or plan to take, any prescription or over-the-counter drugs. Concern is warranted because CONTRAVE and other drugs may affect each other s metabolism.  Patients should be advised to notify their healthcare provider if they become pregnant, intend to become pregnant, or are breastfeeding during therapy.  CONTRAVE should NOT be taken if pregnant or nursing.    Patients with type 2 diabetes mellitus on antidiabetic therapy should be advised to monitor their blood glucose levels and report symptoms of hypoglycemia to their healthcare provider(s).    Patients should be advised to swallow CONTRAVE tablets whole so that the release rate is not altered. Do not chew, divide, or crush tablets if using the Trade drug Contrave, dividing the generic naltrexone is fine.    As always, if any questions/concerns, don't hesitate to call us at the [facility] clinic.    Landen Shepherd MD  [phone].  3/1/2021

## 2021-06-16 NOTE — PROGRESS NOTES
"Raheem Jeong is a 50 y.o. male who is being evaluated via a billable telephone visit.      The patient has been notified of following:     \"This telephone visit will be conducted via a call between you and your physician/provider. We have found that certain health care needs can be provided without the need for a physical exam.  This service lets us provide the care you need with a short phone conversation.  If a prescription is necessary we can send it directly to your pharmacy.  If lab work is needed we can place an order for that and you can then stop by our lab to have the test done at a later time.    Telephone visits are billed at different rates depending on your insurance coverage. During this emergency period, for some insurers they may be billed the same as an in-person visit.  Please reach out to your insurance provider with any questions.    If during the course of the call the physician/provider feels a telephone visit is not appropriate, you will not be charged for this service.\"    Patient has given verbal consent to a Telephone visit? Yes    What phone number would you like to be contacted at? [phone]    Patient would like to receive their AVS by AVS Preference: Mail a copy.    Additional provider notes: 8:11 AM  NS follow up visit for weight loss. Intake visit 12/16/20 at 249 lbs, BMI 39. In the beginning, topamax wasn't tolerated. Started using some intermittent plenity and at our last visit started Naltrexone 3/21. Today, he reports continued weight loss, now down 23 lbs total and feeling good about the changes as he approaches.  RMR datat of 1952 previously w/ protein goal of 85-115g reviewed last visit and reports meals/intake going much better. Protein intake is not tracked closely but stocking house w/ good options, watching his simple starches. Cottage cheese. . Naltrexone is going well, no cravings anymore.  Finds night eating at work is much less.  Getting meals more regularly now " "works well for him and monitoring his schedule much better. No ill effects from Naltrexone. . Plenity is no longer being used as it wasn't particularly helpful.  Feels like his body shape is improving and back/knee pain is improving.     On a separate note, he requested a colonoscopy as his brother had colon cancer at age 55 and he's never had screening previously. His wife recommended Dr. Ambriz and requests a referral to him for screening which I placed today.  Height: 5' 7\" (1.702 m) (12/16/2020 10:00 AM)  Initial Weight: 249 lbs (4/26/2021  7:00 AM)  Weight: (!) 226 lb (102.5 kg) (4/26/2021  7:00 AM)  Weight loss from initial: 23 (4/26/2021  7:00 AM)  % Weight loss: 9.24 % (4/26/2021  7:00 AM)  BMI (Calculated): 39 (12/16/2020 10:00 AM)      Plan:  1. Great work approaching 10% weight reduction.  Continued mindful meals through the day, about every 5-5.5 hours with 30g of lean protein per meal should continue to provide good satisfaction/with continued weight loss.  1600-1700kcal/day is a good goal for you.    2. Naltrexone well tolerated and helpful for crave reduction. Refill sent today X 90 tabs. STOP naltrexone 3 days prior to your upcoming colonoscopy (minimum). You may restart the day after your procedure.    3. Down the road for maintenance of weight loss, averaging 200kcal of exercise/activity per day for the sake of exercise will be required to help maintain a 10% weight reduction. Now is the time to think of the activities/adventures/exercise that you may enjoy through the 4 seasons.    4. You have discontinued Plenity due to minimal help/value.  8:37 AM    Phone call duration: 20 minutes    Landen Shepherd MD      "

## 2021-06-16 NOTE — TELEPHONE ENCOUNTER
Left message to call back for: pt  Information to relay to patient:  Left message to call and schedule physical with dr enamorado

## 2021-06-16 NOTE — TELEPHONE ENCOUNTER
RN cannot approve Refill Request    RN can NOT refill this medication PCP messaged that patient is overdue for Office Visit. Last office visit: 9/16/2019 Genesis Robertson MD Last Physical: 11/25/2019 Last MTM visit: Visit date not found Last visit same specialty: 9/16/2019 Genesis Robertson MD.  Next visit within 3 mo: Visit date not found  Next physical within 3 mo: Visit date not found      Margaret Campbell, [facility] Triage/Med Refill 3/27/2021    Requested Prescriptions   Pending Prescriptions Disp Refills     sildenafiL (VIAGRA) 50 MG tablet [Pharmacy Med Name: SILDENAFIL 50 MG TABLET] 18 tablet 3     Sig: TAKE 1/2 TO 1 TABLET BY MOUTH 1 HOUR PRIOR TO SEX.       Medications for Impotence Refill Protocol Failed - 3/27/2021  9:34 AM        Failed - PCP or prescribing provider visit in last year     Last office visit with prescriber/PCP: 9/16/2019 Genesis Robertson MD OR same dept: Visit date not found OR same specialty: 9/16/2019 Genesis Robertson MD  Last physical: 11/25/2019 Last MTM visit: Visit date not found   Next visit within 3 mo: Visit date not found  Next physical within 3 mo: Visit date not found  Prescriber OR PCP: Genesis Robertson MD  Last diagnosis associated with med order: 1. Primary erectile dysfunction  - sildenafiL (VIAGRA) 50 MG tablet [Pharmacy Med Name: SILDENAFIL 50 MG TABLET]; TAKE 1/2 TO 1 TABLET BY MOUTH 1 HOUR PRIOR TO SEX.  Dispense: 18 tablet; Refill: 3    If protocol passes may refill for 12 months if within 3 months of last provider visit (or a total of 15 months).

## 2021-06-17 NOTE — ANESTHESIA POSTPROCEDURE EVALUATION
Patient: Raheem Jeong  Procedure(s):  COLONOSCOPY  Anesthesia type: MAC    Patient location: Phase II Recovery  Last vitals:   Vitals Value Taken Time   /67 05/25/21 0916   Temp 36.7  C (98.1  F) 05/25/21 0856   Pulse 55 05/25/21 0916   Resp 16 05/25/21 0856   SpO2 95 % 05/25/21 0916   Vitals shown include unvalidated device data.  Post vital signs: stable  Level of consciousness: awake and responds to simple questions  Post-anesthesia pain: pain controlled  Post-anesthesia nausea and vomiting: no  Pulmonary: unassisted, return to baseline  Cardiovascular: stable and blood pressure at baseline  Hydration: adequate  Anesthetic events: no    QCDR Measures:  ASA# 11 - Dasia-op Cardiac Arrest: ASA11B - Patient did NOT experience unanticipated cardiac arrest  ASA# 12 - Dasia-op Mortality Rate: ASA12B - Patient did NOT die  ASA# 13 - PACU Re-Intubation Rate: NA - No ETT / LMA used for case  ASA# 10 - Composite Anes Safety: ASA10A - No serious adverse event    Additional Notes:

## 2021-06-17 NOTE — ANESTHESIA CARE TRANSFER NOTE
Last vitals:   Vitals:    05/25/21 0659   BP: 121/80   Pulse: 78   Resp: 16   Temp: 36.3  C (97.4  F)   SpO2: 97%     Patient's level of consciousness is drowsy  Spontaneous respirations: yes  Maintains airway independently: yes  Dentition unchanged: yes  Oropharynx: oropharynx clear of all foreign objects    QCDR Measures:  ASA# 20 - Surgical Safety Checklist: WHO surgical safety checklist completed prior to induction    PQRS# 430 - Adult PONV Prevention: 4558F - Pt received => 2 anti-emetic agents (different classes) preop & intraop  ASA# 8 - Peds PONV Prevention: NA - Not pediatric patient, not GA or 2 or more risk factors NOT present  PQRS# 424 - Dasia-op Temp Management: 4559F - At least one body temp DOCUMENTED => 35.5C or 95.9F within required timeframe  PQRS# 426 - PACU Transfer Protocol: - Transfer of care checklist used  ASA# 14 - Acute Post-op Pain: ASA14B - Patient did NOT experience pain >= 7 out of 10

## 2021-06-17 NOTE — PROGRESS NOTES
Assessment/Plan:     Patient presents today for routine physical examination.    Healthcare Maintenance: USPSTF recommendations for age 50;  Patient has been counseled on/screened for:  - intimate partner violence and there are no concerns at this time  - a healthful diet and physical activity for CVD prevention  - Diabetes and hyperlipidemia: screening was performed.   - Hep C, negative in 2019  - Asprin use: patient chose to consider  - history of smoking, patient is not eligible for the low dose chest CT today  Sexually transmitted infections: Patient would not like to be screened for chlamydia, gonorrhea, syphilis, HIV, and hepatitis  Colorectal Cancer: Colonoscopy scheduled for later this month  Immunizations: up to date except for ppsv23 and shingles which was recommended      Additional concerns are as detailed below:    1. Cough  Lungs are clear to auscultation bilaterally and duration of cough indicates it is possible that he is experiencing a bronchitis.  The symptoms are mild and I recommended symptomatic treatment.    2. Primary erectile dysfunction  3. Decreased urine stream  4. Nocturia  Patient has symptoms of benign prostatic hypertrophy but I could not appreciate the prostate on exam today.  Would have patient refer back to urology to see if Flomax or additional treatment options might be more worthwhile.  No PSA performed today, normal in 2019.  - Ambulatory referral to Urology - [facility]    5. Skin lesion  Pearly skin lesion concerning for basal cell carcinoma.  Would recommend excision.  Referral placed to derm.      AVS printed and given to patient.  Return to clinic in 4-8 weeks.    I have had an Advance Directives discussion with the patient.    This note has been dictated using voice recognition software. Any grammatical or context distortions are unintentional and inherent to the the software.     Genesis Robertson MD  Family Medicine [facility]    Subjective:     Raheem  "TRISH Jeong is a 50 y.o. male who presents to clinic for routine physical.    Additional concerns include:    1. Phlegm cough for the past few months. It's brown, not green. It lasts for about an hour. He just finished ampicillin for a denture. His wife has the same symptoms. He uses humidifiers.   2. Skin lesion on shin, hard, doesn't go away, new from last year  3. Chronic nocturia, worsened with caffeine.  Difficulty with erections, better with sildenafil.  Decreased urinary stream strength.    PHQ-2 Score:  0    Health Care Directive: discussed    Patient Care Team:   PCP: Genesis Robertson     Past Medical History, Family History, and Social History reviewed.     Review of systems:  Patient endorses: weight changes, allergy sx, joint pain, arthritis, back pain, sexual dysfunction, increased urination  The remainder of the 10 system review is otherwise negative.    Objective:     /78   Pulse 86   Ht 5' 7.75\" (1.721 m)   Wt 219 lb (99.3 kg)   SpO2 97%   BMI 33.55 kg/m    Gen: Alert, NAD, appears stated age, normal hygiene   Eyes: conjunctivae without injection, sclera clear, EOMI  ENT/mouth: nares clear, septum midline, absent rhinorrhea,absent pharyngeal injection, neck is supple, no thyroid enlargement  CV: RRR, no murmur appreciated, pedal edema absent bilaterally  Resp: CTAB, no wheezes, rales or ronchi  ABD: normoactive, non-tender to palpation, nondistended  MSK: grossly full range of motion in all joints, no obvious deformity  Neuro: CN II-XII grossly intact, no deficits in coordination  Psych: no apparent hallucinations or delusions, no pressured speech; alert, oriented x3  SKIN: dry and without lesions, left shin with a subcentimeter pearly raised hard lesion that is flesh-colored  Heme/lymph: no pallor, no active bleeding/bruising, no adenopathy appreciated  : Normal rectal sphincter tone, could not appreciate the prostate secondary to digital length    Medications:  Current Outpatient " Medications   Medication Sig     cholecalciferol, vitamin D3, 1,000 unit tablet Take 1,000 Units by mouth daily.     fluticasone propionate (FLONASE) 50 mcg/actuation nasal spray SPRAY 2 SPRAYS INTO EACH NOSTRIL EVERY DAY AS DIRECTED     loratadine (CLARITIN) 10 mg tablet TAKE 1 TABLET BY MOUTH EVERY DAY     multivitamin therapeutic (THERAGRAN) tablet Take 1 tablet by mouth daily.     naltrexone (DEPADE) 50 mg tablet half tablet twice daily (breakfast and supper).     sildenafiL (VIAGRA) 50 MG tablet TAKE 1/2 TO 1 TABLET BY MOUTH 1 HOUR PRIOR TO SEX.       Allergies:  No Known Allergies    PMH:  Past Medical History:   Diagnosis Date     Anxiety      History of ETOH abuse      ANTHONY (obstructive sleep apnea)        PSH:  Past Surgical History:   Procedure Laterality Date     EYE SURGERY       HERNIA REPAIR       knuckle surgery Left     middle digit on left hand       Family Hx:  Family History   Problem Relation Age of Onset     Diabetes Father      Cancer Brother         prostate       Social History:  Social History     Socioeconomic History     Marital status:      Spouse name: Not on file     Number of children: Not on file     Years of education: Not on file     Highest education level: Not on file   Occupational History     Not on file   Social Needs     Financial resource strain: Not on file     Food insecurity     Worry: Not on file     Inability: Not on file     Transportation needs     Medical: Not on file     Non-medical: Not on file   Tobacco Use     Smoking status: Former Smoker     Quit date: 1998     Years since quittin.3     Smokeless tobacco: Never Used   Substance and Sexual Activity     Alcohol use: No     Comment: previous hx of etoh abuse     Drug use: No     Sexual activity: Yes     Partners: Female   Lifestyle     Physical activity     Days per week: Not on file     Minutes per session: Not on file     Stress: Not on file   Relationships     Social connections     Talks on  phone: Not on file     Gets together: Not on file     Attends Sabianism service: Not on file     Active member of club or organization: Not on file     Attends meetings of clubs or organizations: Not on file     Relationship status: Not on file     Intimate partner violence     Fear of current or ex partner: Not on file     Emotionally abused: Not on file     Physically abused: Not on file     Forced sexual activity: Not on file   Other Topics Concern     Not on file   Social History Narrative     Not on file       LDL Calculated (mg/dL)   Date Value   02/06/2019 101   12/04/2017 109        Immunization History   Administered Date(s) Administered     COVID-19,PF,Pfizer 03/19/2021, 04/09/2021     INFLUENZA,SEASONAL QUAD, PF, =/> 6months 09/19/2014, 09/15/2015, 10/08/2016, 09/30/2020     Influenza O1x3-29, 02/11/2010     Influenza, inj, historic,unspecified 10/01/2017, 12/26/2018     Influenza, seasonal,quad inj 6-35 mos 08/27/2017     Influenza,seasonal, Inj IIV3 10/31/2012, 11/21/2013     Influenza,seasonal,quad inj =/> 6months 09/05/2019     Tdap 12/20/2012     There are no preventive care reminders to display for this patient.

## 2021-06-17 NOTE — ANESTHESIA PREPROCEDURE EVALUATION
Anesthesia Evaluation      Patient summary reviewed   History of anesthetic complications (PONV)     Airway   Mallampati: II  Neck ROM: full   Pulmonary - normal exam   (-) sleep apnea                         Cardiovascular - negative ROS and normal exam   Neuro/Psych    (+) anxiety/panic attacks,     Endo/Other    (+) obesity,      GI/Hepatic/Renal - negative ROS           Dental - normal exam                        Anesthesia Plan  Planned anesthetic: MAC    ASA 2     Anesthetic plan and risks discussed with: patient  Anesthesia plan special considerations: antiemetics,   Post-op plan: routine recovery

## 2021-06-18 NOTE — PATIENT INSTRUCTIONS - HE
Patient Instructions by Landen Shepherd MD at 4/26/2021  8:15 AM     Author: Landen Shepherd MD Service: -- Author Type: Physician    Filed: 4/26/2021  8:38 AM Encounter Date: 4/26/2021 Status: Signed    : Landen Shepherd MD (Physician)       Plan:  1. Great work approaching 10% weight reduction.  Continued mindful meals through the day, about every 5-5.5 hours with 30g of lean protein per meal should continue to provide good satisfaction/with continued weight loss.  1600-1700kcal/day is a good goal for you.    2. Naltrexone well tolerated and helpful for crave reduction. Refill sent today X 90 tabs. STOP naltrexone 3 days prior to your upcoming colonoscopy (minimum). You may restart the day after your procedure.    3. Down the road for maintenance of weight loss, averaging 200kcal of exercise/activity per day for the sake of exercise will be required to help maintain a 10% weight reduction. Now is the time to think of the activities/adventures/exercise that you may enjoy through the 4 seasons.    4. You have discontinued Plenity due to minimal help/value.            LEAN PROTEIN SOURCES  Getting 20-30 grams of protein, 3 meals daily, is appropriate for most people, some need more but more than about 40 grams per meal is not useful.  General rule is drinking one ounce of water per gram of protein eaten over the course of the day:  70 grams of protein each day, drink 70 oz of water.  Protein Source Portion Calories Grams of Protein                           Nonfat, plain Greek yogurt    (10 grams sugar or less) 3/4 cup (6 oz)  12-17   Light Yogurt (10 grams sugar or less) 3/4 cup (6 oz)  6-8   Protein Shake 1 shake 110-180 15-30   Skim/1% Milk or lactose-free milk 1 cup ( 8 oz)  8   Plain or light, flavored soymilk 1 cup  7-8   Plain or light, hemp milk 1 cup 110 6   Fat Free or 1% Cottage Cheese 1/2 cup 90 15   Part skim ricotta cheese 1/2 cup 100 14   Part skim or reduced fat cheese  slices 1 ounce 65-80 8     Mozzarella String Cheese 1 80 8   Canned tuna, chicken, crab or salmon  (canned in water)  1/2 cup 100 15-20   White fish (broiled, grilled, baked) 3 ounces 100 21   [place]/Tuna (broiled, grilled, baked) 3 ounces 150-180 21   Shrimp, Scallops, Lobster, Crab 3 ounces 100 21   Pork loin, Pork Tenderloin 3 ounces 150 21   Boneless, skinless chicken /turkey breast                          (broiled, grilled, baked) 3 ounces 120 21   [place], [place], [place], and Venison 3 ounces 120 21   Lean cuts of red meat and pork (sirloin,   round, tenderloin, flank, ground 93%-96%) 3 ounces 170 21   Lean or Extra Lean Ground Turkey 1/2 cup 150 20   90-95% Lean Johnston Burger 1 rip 140-180 21   Low-fat casserole with lean meat 3/4 cup 200 17   Luncheon Meats                                                        (turkey, lean ham, roast beef, chicken) 3 ounces 100 21   Egg (boiled, poached, scrambled) 1 Egg 60 7   Egg Substitute 1/2 cup 70 10   Nuts (limit to 1 serving per day)  3 Tbsp. 150 7   Nut Melbeta (peanut, almond)  Limit to 1 serving or less daily 1 Tbsp. 90 4   Soy Burger (varies) 1  15   Garbanzo, Black, Kirk Beans 1/2 cup 110 7   Refried Beans 1/2 cup 100 7   Kidney and Lima beans 1/2 cup 110 7   Tempeh 3 oz 175 18   Vegan crumbles 1/2 cup 100 14   Tofu 1/2 cup 110 14   Chili (beans and extra lean beef or turkey) 1 cup 200 23   Lentil Stew/Soup 1 cup 150 12   Black Bean Soup 1 cup 175 12             Exercise Guidance    Nearly everything that bothers us gets better when the proper amount of exercise can be done in the proper amounts.  Getting to that level safely and without injury is the key.  When it comes to weight loss, exercise is especially important in maintaining the weight loss.  Unfortunately, one of the harsh realities is that substantial weight loss slows our metabolism, often anywhere from 5-20%.    Our brain always remembers our heaviest weight and we can return to that if  we're not mindful and moving regularly.  Our biology doesn't understand the concept of having too much energy, only not having enough.  As such, when we lose weight, it's thought that the brain interprets this as we're ill or in a famine and dials back our metabolism to limit further weight loss.  This is why exercise is so important in keeping the weight off and is the main reason people have some weight regain from their low weight point after weight loss.  We have to make up that 10-20% of calories not being burned.Since we can restrict our intake for only so long, exercise becomes very important in our long term healthy weigh maintenance to balance out the occasional indiscretion with our diet.    Generally, for every 5% body weight reduction in a weight loss season, a person needs to add  kilocalories of exercise in their daily routine to keep that weight off for the long term.  This is why it's vital to be starting your fitness regimen during weight loss season, so that routine is well established as you move into your maintenance period.    Additionally, all sorts of good enzymes and genes turn on with exercise and our stress, sleep, mood and bodies feel better when we can get to the point of making ourselves a little sweaty and short of breath 35-50 minutes most days of the week. But we have to start with what we can do first and give ourselves permission to work our way up to this goal.    Who isn't ready for exercise? Well, if you get severe dizziness/palpitations, chest pain or short of breath/faint with even minimal activity like walking across a room or you're having to pause while going up a flight of stairs, then getting your heart and/or lungs fully evaluated prior to starting an exercise regimen is recommended. Everyone else can probably start a program, but everyone may start at a different point:  Some can set a 5-10 minute walking goal and others will be able to ride their bike for an hour.  "     Start with where you're at and look to add 10% more each week until you're at that 150 minutes or more a week (or 75 minutes/week or more of vigorous exercise). Moderate exercise can be estimated as the pace you can carry on a conversation and vigorous is the pace at which you can get 3-5 words out before having to take a breath.  If you're using heart rate monitoring, Moderate is about 60% of your maximum heart rate and vigorous about 75%. (Max heart rate estimated as 220 beats minus your age:  Example: 220-age of 44 =176 Beats per minute (BPM) maximum. 0.6X 176= 105 BPM (moderate), 132 BMP(vigorous)).    If you like to count steps, the 10,000 steps per day does correlate well with weight maintenance but try to make at least 20-25% of those steps at a brisk pace (like you are about to miss your bus).    Finally, if you are pressed for time, it's important to know that some exercise is better than none.  High Intensity Interval training (HIIT) is a good way to get as much out of a short period of working out. If you can't walk, use the stairs, bike or swim; you could use a punching/arm workout regimen for your activity.  The idea with HIIT is to have a 3-6 minute warm up period of low intensity and the 3-6 \"intervals\" where you push the intensity up and then recover and start the next interval. One study showed that 3 intervals of 20 seconds at \"Maximum Effort\" while either biking on a stationary bike or going up stairs and then having 100 seconds recovery time before the next Maximum Effort was equally as beneficial on cardiovascular fitness development as doing 30 minutes of moderately paced walking 3 days weekly over a 6 week period of time.  So intensity matters. You just need to be able to safely do your desired exercise without injury. There are many great HIIT exercises/routines out there. IF you're not doing much exercise currently, I recommend giving your self 2-3 weeks of moderate exercise, 3 days " weekly minimum to get your bones/tendons/muscles used to exercise before going for High Intensity workouts.    If you like to use Apps on the phone, the couch to 5k sree and 7 minute workout apps are nice places to start if you are reasonably healthy.  There are hundreds of other options out there.  Consider viewing Re[employer] if gentler exercise/movement is desired. Videos on [employer] and chair yoga for seniors exist and are free. Check them out and let's get that 3-4 days a week routine going.    Let's move!  Landen Shepherd MD.

## 2021-06-19 NOTE — PROGRESS NOTES
Preoperative Exam    Scheduled Procedure: left wrist tendon repair  Surgery Date:  7/25/18  Surgery Location: [facility] in [place]    Surgeon:  Dr Rangel    Assessment/Plan:     1. Preop general physical exam  - HM1(CBC and Differential)  - Potassium  - HM1 (CBC with Diff)     Surgical Procedure Risk: Low (reported cardiac risk generally < 1%)  Have you had prior anesthesia?: Yes  Have you or any family members had a previous anesthesia reaction:  Yes: nausea  Do you or any family members have a history of a clotting or bleeding disorder?: No  Cardiac Risk Assessment: no increased risk for major cardiac complications    Patient approved for surgery with general or local anesthesia.        Functional Status: Independent  Patient plans to recover at home with family.     Subjective:      Raheem Jeong is a 47 y.o. male who presents for a preoperative consultation.  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  Patient went to [facility] where he was seen and evaluated laceration was closed apparently at that time there was no indication that a tendon had become completely lacerated.  It appears it was 1 of the flexor carpi ulnaris tendons.  Patient subsequently on his own sought an opinion from [facility] specifically Dr. Min Andrews.  Patient is scheduled for tendon repair open surgery at [facility] in the near future here.  Patient has no history chronic kidney disease angina stroke or invasion of any body cavities and therefore satisfies the Benitez criteria for preoperative clearance.  Patient denies any visual disturbances or disturbances of cranial nerves shortness of breath dyspnea chest pain angina abdominal pain diarrhea constipation urgency frequency dysuria.  Patient has been approved for the anticipated surgery all medical questions that were  asked were answered    All other systems reviewed and are negative, other than those listed in the HPI.    Pertinent History  Do you have difficulty breathing or chest pain after walking up a flight of stairs: No  History of obstructive sleep apnea: No  Steroid use in the last 6 months: Yes: prednisone  Frequent Aspirin/NSAID use: No  Prior Blood Transfusion: No  Prior Blood Transfusion Reaction: No  If for some reason prior to, during or after the procedure, if it is medically indicated, would you be willing to have a blood transfusion?:  There is no transfusion refusal.    Current Outpatient Prescriptions   Medication Sig Dispense Refill     cholecalciferol, vitamin D3, 1,000 unit tablet Take 1,000 Units by mouth daily.       loratadine (CLARITIN) 10 mg tablet Take 1 tablet (10 mg total) by mouth daily. 90 tablet 3     multivitamin therapeutic (THERAGRAN) tablet Take 1 tablet by mouth daily.       albuterol (PROAIR HFA;PROVENTIL HFA;VENTOLIN HFA) 90 mcg/actuation inhaler Inhale 2 puffs every 4 (four) hours as needed for wheezing. 1 each 0     aspirin 81 MG EC tablet Take 1 tablet (81 mg total) by mouth daily. 150 tablet 2     atorvastatin (LIPITOR) 40 MG tablet Take 1 tablet (40 mg total) by mouth at bedtime. 90 tablet 3     nitroglycerin (NITROSTAT) 0.3 MG SL tablet Place 1 tablet (0.3 mg total) under the tongue every 5 (five) minutes as needed for chest pain. 5 tablet 0     predniSONE (DELTASONE) 20 MG tablet Take 1 tablet (20 mg total) by mouth daily. 5 tablet 0     No current facility-administered medications for this visit.         No Known Allergies    Patient Active Problem List   Diagnosis     Other chest pain     Unstable angina pectoris (H)       Past Medical History:   Diagnosis Date     Anxiety        Past Surgical History:   Procedure Laterality Date     EYE SURGERY       HERNIA REPAIR         Social History     Social History     Marital status:      Spouse name: N/A     Number of children:  "N/A     Years of education: N/A     Occupational History     Not on file.     Social History Main Topics     Smoking status: Former Smoker     Quit date: 1/1/1998     Smokeless tobacco: Never Used     Alcohol use No      Comment: previous hx of etoh abuse     Drug use: Not on file     Sexual activity: Not on file     Other Topics Concern     Not on file     Social History Narrative       Patient Care Team:  Genesis Robertson MD as PCP - General (Family Medicine)          Objective:     Vitals:    07/23/18 1032   BP: 132/70   Pulse: 93   Temp: 97.5  F (36.4  C)   TempSrc: Oral   SpO2: 95%   Weight: 222 lb 1.6 oz (100.7 kg)   Height: 5' 8.5\" (1.74 m)         Physical Exam:  General Appearance:  Alert, cooperative, no distress  Head:  Normocephalic, no obvious abnormality  Ears: TM anatomy normal  Eyes:  PERRL, EOM's intact, conjunctiva and corneas clear  Nose:  Nares symmetrical, septum midline, mucosa pink, no sinus tenderness  Throat:  Lips, tongue, and mucosa are moist, pink, and intact  Neck:  Supple, symmetrical, trachea midline, no adenopathy; thyroid: no enlargement, symmetric,no tenderness/mass/nodules; no carotid bruit, no JVD  Back:  Symmetrical, no curvature, ROM normal, no CVA tenderness  Chest/Breast:  No mass or tenderness  Lungs:  Clear to auscultation bilaterally, respirations unlabored   Heart:  Normal PMI, regular rate & rhythm, S1 and S2 normal, no murmurs, rubs, or gallops  Abdomen:  Soft, non-tender, bowel sounds active all four quadrants, no mass, or organomegaly  Musculoskeletal:  Tone and strength strong and symmetrical, all extremities patient is wearing a wrist immobilizer on his left wrist  Lymphatic:  No adenopathy  Skin/Hair/Nails:  Skin warm, dry, and intact, no rashes  Neurologic:  Alert and oriented x3, no cranial nerve deficits, normal strength and tone, gait steady  Extremities:  No edema.  Danielle's sign negative.    Genitourinary: deferred  Pulses:  Equal bilaterally    There are " no Patient Instructions on file for this visit.    EKG: Not indicated    Labs:  Labs pending at this time.  Results will be reviewed when available.    Immunization History   Administered Date(s) Administered     Influenza [id number], 02/11/2010     Influenza, inj, historic,unspecified 10/01/2017     Influenza,seasonal quad, PF, 36+MOS 10/08/2016     Tdap 12/20/2012           Electronically signed by Octaviano Hermosillo MD 07/23/18 10:34 AM

## 2021-06-20 NOTE — LETTER
Letter by mAelia Ward PA-C at      Author: Amelia Ward PA-C Service: -- Author Type: --    Filed:  Encounter Date: 2/11/2020 Status: (Other)         February 11, 2020     Patient: Raheem Jeong   YOB: 1970   Date of Visit: 2/11/2020       To Whom It May Concern:    It is my medical opinion that Raheem Jeong may return to work when symptoms begin to resolve. May take 1-4 days.    If you have any questions or concerns, please don't hesitate to call.    Sincerely,        Electronically signed by Amelia Ward PA-C

## 2021-06-21 NOTE — PROGRESS NOTES
"  Assessment/Plan:     1. Acute non-recurrent sinusitis, unspecified location  Discussed that currently this is most likely viral in nature.  If symptoms persist beyond the next 2-3 days, then he may initiate Augmentin.  Advised initiation of fluticasone nasal spray.  Continue over-the-counter treatments.    2. Acute viral conjunctivitis of both eyes  Advised symptomatic treatments.  No indications for antibiotic eyedrops at this time.  May use natural tears or antiallergy eyedrops to assist in management of symptoms.    Patient Instructions   Begin fluticasone (Flonase) nasal spray.  Continue Mucinex and symptomatic treatments.  You may use natural tears or anti-allergy eyedrops to assist in management of eye symptoms.  A cool wet washcloth can be soothing as well.  If your general symptoms do not improve significantly over the next 2-3 days, then you may initiate Augmentin antibiotic as prescribed.    Return in about 9 months (around 8/14/2019) for Annual physical.       Subjective:      Raheem Jeong is a 48 y.o. male presented to clinic today for evaluation of cold symptoms of have been progressing over the past week.  Onset about 8 days ago initially with head cold with nasal congestion, intermittent sore throat, and cough productive of phlegm.  Describes a lot of nasal congestion and drainage is yellow-green in color, seems to be worsening.  No fevers or headaches.  No facial pressure.  Left eye became red 2 days ago, now right eye also.  Describes frequent \"gooping shot\" of the left eye when sleeping, frequent wiping during the daytime.  Denies any nausea or vomiting.  No rashes.  No known exposures.    Current Outpatient Medications   Medication Sig Dispense Refill     atorvastatin (LIPITOR) 40 MG tablet Take 1 tablet (40 mg total) by mouth at bedtime. 90 tablet 3     cholecalciferol, vitamin D3, 1,000 unit tablet Take 1,000 Units by mouth daily.       diazePAM (VALIUM) 5 MG tablet Take 1 tablet (5 mg total) " by mouth at bedtime as needed for muscle spasms. 5 tablet 0     guaiFENesin ER (MUCINEX) 600 mg 12 hr tablet Take 1,200 mg by mouth 2 (two) times a day.       ibuprofen (ADVIL,MOTRIN) 800 MG tablet Take 1 tablet (800 mg total) by mouth 3 (three) times a day. 21 tablet 0     loratadine (CLARITIN) 10 mg tablet Take 1 tablet (10 mg total) by mouth daily. 90 tablet 3     multivitamin therapeutic (THERAGRAN) tablet Take 1 tablet by mouth daily.       albuterol (PROAIR HFA;PROVENTIL HFA;VENTOLIN HFA) 90 mcg/actuation inhaler Inhale 2 puffs every 4 (four) hours as needed for wheezing. 1 each 0     amoxicillin-clavulanate (AUGMENTIN) 875-125 mg per tablet Take 1 tablet by mouth every 12 (twelve) hours for 10 days. 20 tablet 0     aspirin 81 MG EC tablet Take 1 tablet (81 mg total) by mouth daily. 150 tablet 2     fluticasone (FLONASE ALLERGY RELIEF) 50 mcg/actuation nasal spray 2 sprays each side of nose once daily until illness resolved.. 16 g 1     nitroglycerin (NITROSTAT) 0.3 MG SL tablet Place 1 tablet (0.3 mg total) under the tongue every 5 (five) minutes as needed for chest pain. 5 tablet 0     predniSONE (DELTASONE) 20 MG tablet Take 1 tablet (20 mg total) by mouth daily. 5 tablet 0     No current facility-administered medications for this visit.        Past Medical History, Family History, and Social History reviewed.  Past Medical History:   Diagnosis Date     Anxiety      Past Surgical History:   Procedure Laterality Date     EYE SURGERY       HERNIA REPAIR       Patient has no known allergies.  Family History   Problem Relation Age of Onset     Diabetes Father      Social History     Socioeconomic History     Marital status:      Spouse name: Not on file     Number of children: Not on file     Years of education: Not on file     Highest education level: Not on file   Social Needs     Financial resource strain: Not on file     Food insecurity - worry: Not on file     Food insecurity - inability: Not on  "file     Transportation needs - medical: Not on file     Transportation needs - non-medical: Not on file   Occupational History     Not on file   Tobacco Use     Smoking status: Former Smoker     Last attempt to quit: 1998     Years since quittin.8     Smokeless tobacco: Never Used   Substance and Sexual Activity     Alcohol use: No     Comment: previous hx of etoh abuse     Drug use: Not on file     Sexual activity: Not on file   Other Topics Concern     Not on file   Social History Narrative     Not on file         Review of systems is as stated in HPI, and the remainder of the 10 system review is otherwise negative.    Objective:     Vitals:    18 0938   BP: 122/84   Patient Site: Right Arm   Patient Position: Sitting   Cuff Size: Adult Large   Pulse: 88   Resp: 20   Temp: 98.2  F (36.8  C)   TempSrc: Oral   SpO2: 95%   Weight: (!) 238 lb (108 kg)   Height: 5' 8\" (1.727 m)    Body mass index is 36.19 kg/m .    Alert male.  Significant erythema of the conjunctiva and sclera of the left more than right eye.  Tympanic memories erythematous with clear effusions bilaterally.  Nasal mucosa is with moderate erythema congestion.  Oropharynx is with 2+ sized tonsils, mild erythema on the right side.  Neck supple without lymphadenopathy.  Heart with regular rate and rhythm.  Lungs clear and well aerated throughout.  No wheezes or crackles.  Extremities without edema or rashes.    This note has been dictated using voice recognition software. Any grammatical or context distortions are unintentional and inherent to the the software.   "

## 2021-06-21 NOTE — LETTER
Letter by Genesis Robertson MD at      Author: Genesis Robertson MD Service: -- Author Type: --    Filed:  Encounter Date: 11/17/2020 Status: (Other)         November 17, 2020     Patient: Raheem Jeong   YOB: 1970           To Whom It May Concern:    It is my medical opinion that Raheem Jeong may return to work on 11/23/2020.    If you have any questions or concerns, please don't hesitate to call.    Sincerely,        Electronically signed by Genesis Robertson MD

## 2021-06-23 NOTE — PROGRESS NOTES
Assessment/Plan:     Patient presents today for routine physical examination.    Healthcare Maintenance: USPSTF recommendations for age 48;  Patient has been counseled on/screened for:  - intimate partner violence and there are no concerns at this time  - a healthful diet and physical activity for CVD disease prevention  - Diabetes and hyperlipidemia: screening was performed.   Sexually transmitted infections: Patient would like to be screened for chlamydia, gonorrhea, syphilis, HIV, and hepatitis  Immunizations: up to date        Additional concerns are as detailed below:    1. Primary erectile dysfunction  Patient would like his testosterone checked for his primary erectile dysfunction, fatigue, and low energy.  -Pt would prefer a referral to urology, this was placed  - Testosterone, Free and Total    2. Adjustment disorder with depressed mood  This is being managed by psychology or psychiatry, currently patient is on no medication.  - PHQ9 Depression Screen    3. Unstable angina pectoris (H)  -No recurrent episodes, cleared by cardiology per patient report, followed by his chiropractor, return precautions discussed    4. Family history of prostate cancer  We discussed that the PSA is not a perfect screening tool the patient would still like to be checked, prostate exam was limited by patient's inability to relax but demonstrated no significant nodularity or irregularities.  - PSA, Annual Screen (Prostatic-Specific Antigen)    5.  Arthritis  -Recommended cessation of daily ibuprofen use which patient had been using since May, topical diclofenac prescribed        Return to clinic in 1 year.    I have had an Advance Directives discussion with the patient.  The following are part of a depression follow up plan for the patient:  mental health care assessment and mental health care management  The following high BMI interventions were performed this visit: weight loss from baseline weight    Total time spent with  "patient was 40 minutes with greater than 50% spent in face-to-face counseling regarding the above plan.    This note has been dictated using voice recognition software. Any grammatical or context distortions are unintentional and inherent to the the software.     Genesis Robertson MD  [facility]    Subjective:     Raheem Jeong is a 48 y.o. male who presents to clinic for routine physical.    Additional concerns include:    1.  Patient's brothers recently diagnosed prostate cancer.  He would like to be checked for this.    2.  Patient endorses some fatigue and trouble sleeping.  He would like his testosterone checked.  We discussed that this could have other etiologies but he would specifically like testosterone.    3.  Patient's mood has been within normal limits for him, but it is not as good as he would hope.  He has been followed by psychology.  If he needs medication he believes he can obtain them from this office.    4.  Patient was previously seen for concerning exertional chest pain.  He was cleared by cardiology status post a negative stress test.  His chiropractor has been helping with muscular skeletal pain.  He is not having any recurrent issues.    Diet: eats a lot of meat and vegatables but eats out a lot; states it is \"so-so\"  Physical Activity: The patient engages in little, if any physical activity.    PHQ-9 Score:  10    Health Care Directive: not on file but handout provided    Patient Care Team:   PCP: Genesis Robertson     Past Medical History, Family History, and Social History reviewed.     Review of systems is as stated in HPI.  Patient endorses: back pain, joint pain, increased urination and fatigue  The remainder of the 10 system review is otherwise negative.    Objective:     /88   Pulse 97   Ht 5' 8\" (1.727 m)   Wt (!) 231 lb (104.8 kg)   SpO2 96%   BMI 35.12 kg/m    Gen: Alert, NAD, appears stated age, normal hygiene   Eyes: conjunctivae without injection, " sclera clear, EOMI  ENT/mouth: nares clear, septum midline, absent rhinorrhea,absent pharyngeal injection, neck is supple, no thyroid enlargement  CV: RRR, no murmur appreciated, pedal edema absent bilaterally  Resp: CTAB, no wheezes, rales or ronchi  ABD: normoactive, non-tender to palpation, nondistended  MSK: grossly full range of motion in all joints, no obvious deformity  Neuro: CN II-XII grossly intact, no deficits in coordination  Psych: no apparent hallucinations or delusions, no pressured speech; alert, oriented x3  SKIN: dry and without lesions  Heme/lymph: no pallor, no active bleeding/bruising, no adenopathy appreciated  : Technically challenging exam secondary to patient's inability to relax, digital rectal exam demonstrated that the bottom half of the prostate was smooth and without nodularity    Medications:  Current Outpatient Medications   Medication Sig     cholecalciferol, vitamin D3, 1,000 unit tablet Take 1,000 Units by mouth daily.     ibuprofen (ADVIL,MOTRIN) 800 MG tablet Take 1 tablet (800 mg total) by mouth 3 (three) times a day.     loratadine (CLARITIN) 10 mg tablet Take 1 tablet (10 mg total) by mouth daily.     multivitamin therapeutic (THERAGRAN) tablet Take 1 tablet by mouth daily.     diclofenac sodium (VOLTAREN) 1 % Gel Apply twice daily 1 gram to hands as needed for pain       Allergies:  No Known Allergies    PMH:  Past Medical History:   Diagnosis Date     Anxiety      History of ETOH abuse        PSH:  Past Surgical History:   Procedure Laterality Date     EYE SURGERY       HERNIA REPAIR         Family Hx:  Family History   Problem Relation Age of Onset     Diabetes Father      Cancer Brother         prostate       Social History:  Social History     Socioeconomic History     Marital status:      Spouse name: Not on file     Number of children: Not on file     Years of education: Not on file     Highest education level: Not on file   Social Needs     Financial resource  strain: Not on file     Food insecurity - worry: Not on file     Food insecurity - inability: Not on file     Transportation needs - medical: Not on file     Transportation needs - non-medical: Not on file   Occupational History     Not on file   Tobacco Use     Smoking status: Former Smoker     Last attempt to quit: 1998     Years since quittin.1     Smokeless tobacco: Never Used   Substance and Sexual Activity     Alcohol use: No     Comment: previous hx of etoh abuse     Drug use: No     Sexual activity: Yes     Partners: Female   Other Topics Concern     Not on file   Social History Narrative     Not on file       LDL Calculated (mg/dL)   Date Value   2017 109        Immunization History   Administered Date(s) Administered     Influenza [id number], 2010     Influenza, inj, historic,unspecified 10/01/2017, 2018     Influenza, seasonal,quad inj 6-35 mos 2017     Influenza,seasonal quad, PF, 36+MOS 2014, 09/15/2015, 10/08/2016     Influenza,seasonal, Inj IIV3 10/31/2012, 2013     Tdap 2012     There are no preventive care reminders to display for this patient.

## 2021-06-24 ENCOUNTER — COMMUNICATION - HEALTHEAST (OUTPATIENT)
Dept: SURGERY | Facility: CLINIC | Age: 51
End: 2021-06-24
Payer: COMMERCIAL

## 2021-06-24 ENCOUNTER — COMMUNICATION - HEALTHEAST (OUTPATIENT)
Dept: FAMILY MEDICINE | Facility: CLINIC | Age: 51
End: 2021-06-24

## 2021-06-24 DIAGNOSIS — J31.0 CHRONIC RHINITIS: ICD-10-CM

## 2021-06-24 NOTE — PROGRESS NOTES
Assessment/Plan:    1. Sore throat  Symptoms concerning for possible streptococcal sore throat versus viral illness.  Rapid strep came back negative today, culture pending.  Discussed waiting for culture results before initiating antibiotics.  Discussed supportive cares including: Tylenol/ibuprofen as needed, cough syrup/drops, adequate hydration, adequate rest, gargling salt water, Mucinex as needed, Benadryl as needed to help with sleep.  Discussed typical duration of viral illness as 7-10 days, if symptoms are persisting through next week patient will call or return to clinic for reevaluation.  - Rapid Strep A Screen- Throat Swab  - Group A Strep, RNA Direct Detection, Throat      Follow up: as needed for persistent or worsening symptoms    Giovanna Clark MD  [facility]    Subjective:    Patient ID: Raheem Jeong is a 48 y.o. male is here today for sore throat    Sore thoat  -started last Thursday (7 days)  -associated with sinus pressure  -no significant cough, though has noticed some increased phlegm  -works nights, having a hard time sleeping d/t tonsils (feels like tonsils is blocking airway)  -no subjective fever  -no vomiting, diarrhea, rashes  -no known sick contacts  -no specific OTC meds used except cough drops    Requesting calcium check  -pt here on 2/6/19, labs done and showed mildly elevated calcium level  -pt worried about this and wondering if it could be rechecked today  -PCP had recommended rechecking in 1 month - discussed waiting longer before rechecking level      Patient Active Problem List   Diagnosis     Unstable angina pectoris (H)     Adjustment disorder with depressed mood     SHIMON (generalized anxiety disorder)     ANTHONY (obstructive sleep apnea)     Primary erectile dysfunction     Past Medical History:   Diagnosis Date     Anxiety      History of ETOH abuse      Past Surgical History:   Procedure Laterality Date     EYE SURGERY       HERNIA REPAIR       Current  Outpatient Medications on File Prior to Visit   Medication Sig Dispense Refill     cholecalciferol, vitamin D3, 1,000 unit tablet Take 1,000 Units by mouth daily.       diclofenac sodium (VOLTAREN) 1 % Gel Apply twice daily 1 gram to hands as needed for pain 1 Tube 3     ibuprofen (ADVIL,MOTRIN) 800 MG tablet Take 1 tablet (800 mg total) by mouth 3 (three) times a day. 21 tablet 0     loratadine (CLARITIN) 10 mg tablet Take 1 tablet (10 mg total) by mouth daily. 90 tablet 3     multivitamin therapeutic (THERAGRAN) tablet Take 1 tablet by mouth daily.       No current facility-administered medications on file prior to visit.      No Known Allergies  Social History     Socioeconomic History     Marital status:      Spouse name: Not on file     Number of children: Not on file     Years of education: Not on file     Highest education level: Not on file   Social Needs     Financial resource strain: Not on file     Food insecurity - worry: Not on file     Food insecurity - inability: Not on file     Transportation needs - medical: Not on file     Transportation needs - non-medical: Not on file   Occupational History     Not on file   Tobacco Use     Smoking status: Former Smoker     Last attempt to quit: 1998     Years since quittin.1     Smokeless tobacco: Never Used   Substance and Sexual Activity     Alcohol use: No     Comment: previous hx of etoh abuse     Drug use: No     Sexual activity: Yes     Partners: Female   Other Topics Concern     Not on file   Social History Narrative     Not on file     Review of systems is as stated in HPI, and the remainder of system review is otherwise negative.    Objective:      /70   Pulse 86   Temp 97.7  F (36.5  C)   Wt (!) 234 lb (106.1 kg)   SpO2 96%   BMI 35.58 kg/m      General appearance: awake, NAD  HEENT: atraumatic, normocephalic, PERRL, EOMI, no scleral icterus or injection, TMs normal bilaterally without erythema or effusion, nose grossly  normal, nasal turbinates swollen and erythematous bilaterally, clear rhinorrhea bilaterally, mild maxillary sinus pressure bilaterally, mild erythema of posterior oropharynx, tonsils are enlarged, moist mucous membranes  Neck: supple, no lymphadenopathy, normal ROM  CV: RRR, no murmurs/rubs/gallops, normal S1 and S2  Lungs: CTAB, no wheezes or crackles, breathing comfortably on room air, no cough observed  Neuro: alert, oriented x3, CNs grossly intact, no focal deficits appreciated  Psych: normal mood/affect/behavior, answering questions appropriately, linear thought process

## 2021-06-24 NOTE — TELEPHONE ENCOUNTER
RN cannot approve Refill Request    RN can NOT refill this medication med is not covered by policy/route to provider. Last office visit: 2/5/2018 Genesis Robertson MD Last Physical: 2/6/2019 Last MTM visit: Visit date not found Last visit same specialty: 2/14/2019 Giovanna Clark MD.  Next visit within 3 mo: Visit date not found  Next physical within 3 mo: Visit date not found      Danita Marroquin, [facility] Triage/Med Refill 3/6/2019    Requested Prescriptions   Pending Prescriptions Disp Refills     diclofenac sodium (VOLTAREN) 1 % Gel 1 Tube 3     Sig: Apply twice daily 1 gram to hands as needed for pain    There is no refill protocol information for this order

## 2021-06-24 NOTE — PATIENT INSTRUCTIONS - HE
Likely bad virus  -tylenol and/or ibuprofen as needed for pain/headache/fever  -cough syrup or drops   -lots of water  -rest  -gargling salt water to help with tonsils  -take 50mg benadryl to help with sleep and dry out mucus  -can try mucinex (mucinex DM is fine to use as well)  -should know by tomorrow throat culture results - if positive for strep will call in antibiotic to your pharmacy

## 2021-06-24 NOTE — TELEPHONE ENCOUNTER
Refill Request  Did you contact pharmacy: No  Medication name:   Requested Prescriptions     Pending Prescriptions Disp Refills     diclofenac sodium (VOLTAREN) 1 % Gel 1 Tube 3     Sig: Apply twice daily 1 gram to hands as needed for pain     Who prescribed the medication: Dr Robertson  Pharmacy Name and Location: [facility].  Fax request received from pharmacy states: patient is requesting a 90 day supply.    Okay to leave a detailed message: yes

## 2021-06-26 NOTE — PROGRESS NOTES
Progress Notes by Ammon Galeana DO at 1/10/2018  9:10 AM     Author: Ammon Galeana DO Service: -- Author Type: Physician    Filed: 1/10/2018  9:59 AM Encounter Date: 1/10/2018 Status: Signed    : Ammon Galeana DO (Physician)           Click to link to [facility] HEART CARE NOTE    Thank you, Dr. Robertson, for asking the [facility] Care team to see Mr. Raheem Jeong to evaluate recurrent anginal chest pain.      Assessment/Recommendations   Assessment:    1.  Recurrent anginal chest pain.  Patient states he has a pressure sensation across his chest with moderate to strenuous activity which resolves after 5 minutes of rest.    Plan:  1.  Coronary CT a 2 rule out the possibility of a false negative echo stress test and aortic disease.  Patient has classic anginal symptoms.  Only other explanation could be adhesion after her pneumonia a year ago.  2.  Aspirin 81 mg daily  3.  As needed sublingual nitroglycerin       History of Present Illness    Mr. Raheem Jeong is a 47 y.o. male with prior cardiac history who presents in cardiology clinic with anginal chest pain.    According to the patient over the past year he is noticed progressive chest discomfort when walking at a rapid pace or performing moderate to strenuous activity.  He states his chest pain is left-sided pressure-like which he occurs during exercise and resolves after 5 minutes of rest.  It is not associated with lightheadedness or near syncope.  He has not noted any shortness of breath symptoms.    Recently had an exercise echo stress test which demonstrated no inducible wall motion abnormality.  His EKG had some nonspecific ST depressions which were 1 mm horizontal upright ST segment changes with exercise.  He had mild chest pain per the patient with exercise (not reported in results).      Given the patient has ongoing anginal chest pain despite normal echo stress test with no other  explanation for his chest pain symptoms I think further testing via CT angiogram is warranted.  CT angiogram and chest were also rule out the possibility of aortic disease.  If his CT angiogram demonstrates normal coronary artery flow his symptoms are likely from an adhesion from a pneumonia last year.      His chest pain symptoms do not occur with eating.  His chest pain symptoms are not worse with positional changes.    Patient has no prior history of reactive surgery.  He has no prior history of coronary intervention.  There is no family history of premature coronary artery disease.    STRESS ECHO (personnaly reviewed): 12/18/2017    The patient's exercise tolerance was normal. No exercise-induced chest pain    The stress electrocardiogram is negative for inducible ischemic EKG changes and arrhythmias.    Left ventricle ejection fraction is normal. The estimated left ventricular ejection fraction is 60%. No significant valvular abnormalities.    Stress echocardiogram is negative for inducible ischemia.     Physical Examination Review of Systems   Vitals:    01/10/18 0907   BP: 100/66   Pulse: 62   Resp: 18     Body mass index is 32.23 kg/(m^2).  Wt Readings from Last 3 Encounters:   01/10/18 212 lb (96.2 kg)   01/08/18 212 lb (96.2 kg)   12/04/17 209 lb (94.8 kg)       General Appearance:   no distress, normal body habitus   ENT/Mouth: membranes moist, no oral lesions or bleeding gums.      EYES:  no scleral icterus, normal conjunctivae   Neck: no carotid bruits or thyromegaly   Chest/Lungs:   lungs are clear to auscultation, no rales or wheezing, no sternal scar, equal chest wall expansion    Cardiovascular:   Regular. Normal first and second heart sounds with no murmurs, rubs, or gallops; the carotid, radial and posterior tibial pulses are intact, Jugular venous pressure normal, no edema bilaterally    Abdomen:  no organomegaly, masses, bruits, or tenderness; bowel sounds are present   Extremities: no cyanosis  or clubbing   Skin: no xanthelasma, warm.    Neurologic: normal gait, normal  bilateral, no tremors     Psychiatric: alert and oriented x3, calm     General: WNL  Eyes: WNL  Ears/Nose/Throat: WNL  Lungs: Snoring  Heart: Chest Pain  Stomach: WNL  Bladder: WNL  Muscle/Joints: WNL  Skin: WNL  Nervous System: Daytime Sleepiness  Mental Health: WNL     Blood: WNL     Medical History  Surgical History Family History Social History   Past Medical History:   Diagnosis Date   ? Anxiety     Past Surgical History:   Procedure Laterality Date   ? EYE SURGERY     ? HERNIA REPAIR      Family History   Problem Relation Age of Onset   ? Diabetes Father     Social History     Social History   ? Marital status:      Spouse name: N/A   ? Number of children: N/A   ? Years of education: N/A     Occupational History   ? Not on file.     Social History Main Topics   ? Smoking status: Former Smoker     Quit date: 1/1/1998   ? Smokeless tobacco: Never Used   ? Alcohol use No      Comment: previous hx of etoh abuse   ? Drug use: Not on file   ? Sexual activity: Not on file     Other Topics Concern   ? Not on file     Social History Narrative          Medications  Allergies   Current Outpatient Prescriptions   Medication Sig Dispense Refill   ? cholecalciferol, vitamin D3, 1,000 unit tablet Take 1,000 Units by mouth daily.     ? loratadine (CLARITIN) 10 mg tablet Take 10 mg by mouth daily.     ? multivitamin therapeutic (THERAGRAN) tablet Take 1 tablet by mouth daily.     ? albuterol (PROAIR HFA;PROVENTIL HFA;VENTOLIN HFA) 90 mcg/actuation inhaler Inhale 2 puffs every 4 (four) hours as needed for wheezing. 1 each 0   ? nitroglycerin (NITROSTAT) 0.3 MG SL tablet Place 1 tablet (0.3 mg total) under the tongue every 5 (five) minutes as needed for chest pain. 5 tablet 0     No current facility-administered medications for this visit.       No Known Allergies      Lab Results    Chemistry/lipid CBC Cardiac Enzymes/BNP/TSH/INR   Lab  Results   Component Value Date    CHOL 164 12/04/2017    HDL 45 12/04/2017    LDLCALC 109 12/04/2017    TRIG 50 12/04/2017    CREATININE 0.83 12/04/2017    BUN 10 12/04/2017    K 4.2 12/04/2017     12/04/2017     12/04/2017    CO2 25 12/04/2017    Lab Results   Component Value Date    WBC 8.3 05/22/2017    HGB 13.8 (L) 05/22/2017    HCT 40.0 05/22/2017    MCV 90 05/22/2017     05/22/2017    No results found for: CKTOTAL, CKMB, CKMBINDEX, TROPONINI, BNP, TSH, INR

## 2021-06-26 NOTE — TELEPHONE ENCOUNTER
RN cannot approve Refill Request    RN can NOT refill this medication Medication was discontinued today by provider, routing to pcp to advise on. Last office visit: Visit date not found Last Physical: Visit date not found Last MTM visit: Visit date not found Last visit same specialty: 9/16/2019 Genesis Robertson MD.  Next visit within 3 mo: Visit date not found  Next physical within 3 mo: Visit date not found      Miriam De Guzman, [facility] Triage/Med Refill 6/24/2021    Requested Prescriptions   Pending Prescriptions Disp Refills     loratadine (CLARITIN) 10 mg tablet [Pharmacy Med Name: LORATADINE 10 MG TABLET] 90 tablet 1     Sig: TAKE 1 TABLET BY MOUTH EVERY DAY       Antihistamine Refill Protocol Passed - 6/23/2021  8:35 AM        Passed - Patient has had office visit/physical in last year     Last office visit with prescriber/PCP: Visit date not found OR same dept: Visit date not found OR same specialty: 9/16/2019 Genesis Robertson MD  Last physical: Visit date not found Last MTM visit: Visit date not found   Next visit within 3 mo: Visit date not found  Next physical within 3 mo: Visit date not found  Prescriber OR PCP: Stacy Mendoza MD  Last diagnosis associated with med order: 1. Chronic rhinitis  - loratadine (CLARITIN) 10 mg tablet [Pharmacy Med Name: LORATADINE 10 MG TABLET]; TAKE 1 TABLET BY MOUTH EVERY DAY  Dispense: 90 tablet; Refill: 1    If protocol passes may refill for 12 months if within 3 months of last provider visit (or a total of 15 months).

## 2021-07-03 NOTE — ADDENDUM NOTE
Addendum Note by Genesis Becker MD at 2/6/2019  9:50 AM     Author: Genesis Becker MD Service: -- Author Type: Physician    Filed: 2/6/2019 10:34 AM Encounter Date: 2/6/2019 Status: Signed    : Genesis Becker MD (Physician)    Addended by: GENESIS BECKER on: 2/6/2019 10:34 AM        Modules accepted: Orders

## 2021-07-04 NOTE — LETTER
Letter by Therese Dubois RN at      Author: Therese Dubois RN Service: -- Author Type: --    Filed:  Encounter Date: 5/28/2021 Status: (Other)       5/28/2021    Raheem Jeong  [address] [place] [address] [address]  [facility] [id number]    Re: Recent Colonoscopy    Dear Raheem Jeong.    We are writing with results from your recent colonoscopy which showed:     The polyps removed from your colonoscopy do not show any cancer.  They are serrated adenomas which do present a higher risk of converting to cancer if left unchecked.  For this reason I am glad you came in for your colonoscopy and I would recommend the next colonoscopy to be in 3 years or the year 2024.     If any new concerns arise in the meantime, please contact your primary care provider for evaluation so your provider can help you decide if you need a colonscopy sooner. Some things to watch for include blood in your stool, stomach pain or cramping that does not resolve, or unexplained weight loss.    We sincerely appreciate the opportunity to provide your care. If you have any questions please feel free to contact us at [phone].    Sincerely,     Octaviano Ambriz MD

## 2021-07-04 NOTE — ADDENDUM NOTE
Addendum Note by Stepan Marks MD at 4/28/2021 10:09 AM     Author: Stepan Marks MD Service: -- Author Type: Physician    Filed: 4/30/2021  3:57 PM Encounter Date: 4/28/2021 Status: Signed    : Stepan Marks MD (Physician)    Addended by: STEPAN MARKS on: 4/30/2021 03:57 PM        Modules accepted: Orders

## 2021-07-06 VITALS
BODY MASS INDEX: 32.93 KG/M2 | BODY MASS INDEX: 32.93 KG/M2 | BODY MASS INDEX: 33.67 KG/M2 | WEIGHT: 215 LBS | HEIGHT: 67 IN | HEIGHT: 67 IN | WEIGHT: 215 LBS | BODY MASS INDEX: 33.67 KG/M2

## 2021-07-12 ENCOUNTER — ALLIED HEALTH/NURSE VISIT (OUTPATIENT)
Dept: FAMILY MEDICINE | Facility: CLINIC | Age: 51
End: 2021-07-12
Payer: COMMERCIAL

## 2021-07-12 ENCOUNTER — TELEPHONE (OUTPATIENT)
Dept: FAMILY MEDICINE | Facility: CLINIC | Age: 51
End: 2021-07-12

## 2021-07-12 DIAGNOSIS — Z00.00 HEALTHCARE MAINTENANCE: Primary | ICD-10-CM

## 2021-07-12 DIAGNOSIS — Z00.00 HEALTHCARE MAINTENANCE: ICD-10-CM

## 2021-07-12 PROCEDURE — 90471 IMMUNIZATION ADMIN: CPT | Performed by: FAMILY MEDICINE

## 2021-07-12 PROCEDURE — 90750 HZV VACC RECOMBINANT IM: CPT | Performed by: FAMILY MEDICINE

## 2021-07-14 PROBLEM — I20.0 UNSTABLE ANGINA PECTORIS (H): Status: RESOLVED | Noted: 2018-01-10 | Resolved: 2019-09-05

## 2021-08-03 PROBLEM — E66.01 MORBID OBESITY (H): Status: RESOLVED | Noted: 2020-12-16 | Resolved: 2021-05-03

## 2021-09-01 ENCOUNTER — VIRTUAL VISIT (OUTPATIENT)
Dept: SURGERY | Facility: CLINIC | Age: 51
End: 2021-09-01
Payer: COMMERCIAL

## 2021-09-01 ENCOUNTER — TRANSFERRED RECORDS (OUTPATIENT)
Dept: HEALTH INFORMATION MANAGEMENT | Facility: CLINIC | Age: 51
End: 2021-09-01

## 2021-09-01 VITALS — BODY MASS INDEX: 33.27 KG/M2 | WEIGHT: 212 LBS | HEIGHT: 67 IN

## 2021-09-01 DIAGNOSIS — E66.811 OBESITY, CLASS I, BMI 30-34.9: Primary | ICD-10-CM

## 2021-09-01 PROCEDURE — 99213 OFFICE O/P EST LOW 20 MIN: CPT | Mod: 95 | Performed by: EMERGENCY MEDICINE

## 2021-09-01 RX ORDER — NALTREXONE HYDROCHLORIDE 50 MG/1
50 TABLET, FILM COATED ORAL DAILY
Qty: 90 TABLET | Refills: 1 | Status: SHIPPED | OUTPATIENT
Start: 2021-09-01 | End: 2022-11-03

## 2021-09-01 RX ORDER — NALTREXONE HYDROCHLORIDE 50 MG/1
1 TABLET, FILM COATED ORAL DAILY
COMMUNITY
Start: 2021-06-24 | End: 2021-09-01

## 2021-09-01 RX ORDER — TADALAFIL 5 MG/1
1 TABLET ORAL DAILY
COMMUNITY
End: 2022-08-08

## 2021-09-01 RX ORDER — FEXOFENADINE HCL 180 MG/1
180 TABLET ORAL DAILY
COMMUNITY
End: 2022-05-05

## 2021-09-01 RX ORDER — SOLIFENACIN SUCCINATE 5 MG/1
1 TABLET, FILM COATED ORAL EVERY 24 HOURS
COMMUNITY
End: 2022-05-05

## 2021-09-01 ASSESSMENT — MIFFLIN-ST. JEOR: SCORE: 1775.26

## 2021-09-01 NOTE — LETTER
"    9/1/2021         RE: Raheem Jeong  [address] [place] [address] [address]  [facility] [address]        Dear Colleague,    Thank you for referring your patient, Raheem Jeong, to the [facility] [place]. Please see a copy of my visit note below.      The patient has been notified of following:     \"This telephone visit will be conducted via a call between you and your physician/provider. We have found that certain health care needs can be provided without the need for a physical exam.  This service lets us provide the care you need with a short phone conversation.  If a prescription is necessary we can send it directly to your pharmacy.  If lab work is needed we can place an order for that and you can then stop by our lab to have the test done at a later time.    Telephone visits are billed at different rates depending on your insurance coverage. During this emergency period, for some insurers they may be billed the same as an in-person visit.  Please reach out to your insurance provider with any questions.    If during the course of the call the physician/provider feels a telephone visit is not appropriate, you will not be charged for this service.\"    Patient has given verbal consent to a Telephone visit? Yes    What phone number would you like to be contacted at? [phone]    Patient would like to receive their AVS by Che    Additional provider notes:   Bariatric Clinic Follow-Up Visit:    Raheem Jeong is a 51 year old  male with Body mass index is 33.2 kg/m .  presenting here today for follow-up on non-surgical efforts for weight loss. Original Intake visit occurred on 12/16/20 with a weight of 249 lbs and BMI of 39.  Along with diet and behavior changes, he has been using naltrexone and bupropion (added bupropion at last visit in June) to assist his weight loss goals.  See his intake visit notes for details on identified contributors to weight gain in the past. Chart " "review shows Dietician calculated RMR of 1952 and protein intake goal of 87-116g/day.    Weight:   Wt Readings from Last 3 Encounters:   09/01/21 96.2 kg (212 lb)   06/24/21 96.2 kg (212 lb)   05/25/21 97.5 kg (215 lb)    pounds      Comorbidities:  Patient Active Problem List   Diagnosis     CARDIOVASCULAR SCREENING; LDL GOAL LESS THAN 160     Podagra     Adjustment disorder with depressed mood     ANTHONY (obstructive sleep apnea)       Current Outpatient Medications:      cholecalciferol 25 MCG (1000 UT) TABS, Take 1,000 Units by mouth daily, Disp: , Rfl:      fexofenadine (ALLEGRA) 180 MG tablet, Take 180 mg by mouth daily, Disp: , Rfl:      loratadine (CLARITIN) 10 MG tablet, Take 10 mg by mouth daily, Disp: , Rfl:      multivitamin, therapeutic with minerals (MULTI-VITAMIN) TABS tablet, Take 1 tablet by mouth daily, Disp: , Rfl:      naltrexone (DEPADE/REVIA) 50 MG tablet, Take 1 tablet by mouth daily, Disp: , Rfl:      solifenacin (VESICARE) 5 MG tablet, Take 1 tablet by mouth every 24 hours, Disp: , Rfl:      tadalafil (CIALIS) 5 MG tablet, Take 1 tablet by mouth daily, Disp: , Rfl:       Interim: Since our last visit, he has reported stable weight since June at 212 lbs.  Maintaining 37 lb reduction in weight. \"hard time w/ diet\", more \"cheating\". Not eating much extra at work and avoiding regular pizza/ice cream. Reduced to once daily weekly from multiple  Didn't tolerate bupropion due to sleep effects. No etoh or nicotine.  Exercise: weekends will do fairs/walking.   Some walking at work.   Reviewed metabolic slowing and need for exercise averaging about 2000kcal/week given his nearly 15% total body weight reduction. Pretty sedentary currently at home and work.   Finds naltrexone very helpful, no ill effects so refill given. Didn't tolerate bupropion. In the past didn't find Plenity helpful.    Plan:   1.  Diet: aiming to maintain a weight around 210-215 lbs the next 6 months. Continue mindful eating/protein " focus and avoiding sweet 5-6 days/week.  2. Exercise: to maintain your 15% body weight reduction, aiming for 2000kcal/week of activity/exercise/active hobbies will reduce risk of large weight gain over the next year. See below for links to calorie calculators  3. Medication: refilled naltrexone. Discontinued bupropion due to poor sleep while using.  4.  Stress Reduction:  Finding active hobbies/exerise can help  5. Goals: Maintaining 210-215 lbs. If going well over the next 3-6 months we'll follow up on an as needed bases and recommend refocused efforts if weight gets above 220lbs.  Semaglutide (Wegovy) could be a tool for future use if needed.  6. Recheck 10-12 weeks.    We discussed [facility] Bariatric Basics including:  -eating 3 meals daily  -reviewed metabolic needs for weight loss based on Resting Metabolic Rate  -protein goals supportive of healthy weight loss  -avoiding/limiting calorie containing beverages  -We discussed the importance of restorative sleep and stress management in maintaining a healthy weight.  -We discussed the National Weight Control Registry healthy weight maintenance strategies and ways to optimize metabolism.  -We discussed the importance of physical activity including cardiovascular and strength training in maintaining a healthier weight and explored viable options.    Most recent labs:  Lab Results   Component Value Date    WBC 6.3 01/20/2021    HGB 14.5 01/20/2021    HCT 41.8 01/20/2021    MCV 88 01/20/2021     01/20/2021     Lab Results   Component Value Date    CHOL 152 05/03/2021     Lab Results   Component Value Date    HDL 37 (L) 05/03/2021     No components found for: LDLCALC  Lab Results   Component Value Date    TRIG 66 05/03/2021     No components found for: CHOLHDL  Lab Results   Component Value Date    ALT 17 05/03/2021    AST 18 05/03/2021    ALKPHOS 78 05/03/2021     No results found for: HGBA1C  No components found for: QGBCFVUR15  No components found for:  "[id number]  No components found for: FERRITIN  No components found for: PTH  No components found for: 25443  No components found for: 7597  Lab Results   Component Value Date    TSH 0.33 01/20/2021     No results found for: TESTOSTERONE    DIETARY HISTORY    Positive Changes Since Last Visit: maintaining  Struggling With: lack of exercise    Knowledgeable in [employer]: yes  Getting Adequate Protein: often  Sleeping 7-8 hours/day often, works 3rd shift  Stress management sometimes getting together with friends    PHYSICAL ACTIVITY PATTERNS:  Cardiovascular: minimal  Strength Training: minimal    REVIEW OF SYSTEMS  Feels well..  PHYSICAL EXAM:  Vitals: Ht 1.702 m (5' 7\")   Wt 96.2 kg (212 lb)   BMI 33.20 kg/m    Weight:   Wt Readings from Last 3 Encounters:   09/01/21 96.2 kg (212 lb)   06/24/21 96.2 kg (212 lb)   05/25/21 97.5 kg (215 lb)         GEN: Pleasant, mildly fatigued sounding on phone. No cough.  Medical Decision Making:  Interim study results: none..      25 minutes spent on the date of the encounter doing chart review, history and exam, documentation and further activities per the note   Landen Shepherd MD  [facility]  8:15 AM  9/1/2021      8:38 AM        Again, thank you for allowing me to participate in the care of your patient.        Sincerely,        Landen Shepherd MD    "

## 2021-09-01 NOTE — PATIENT INSTRUCTIONS
"Plan:   1.  Diet: aiming to maintain a weight around 210-215 lbs the next 6 months. Continue mindful eating/protein focus and avoiding sweet 5-6 days/week.  2. Exercise: to maintain your 15% body weight reduction, aiming for 2000kcal/week of activity/exercise/active hobbies will reduce risk of large weight gain over the next year. See below for links to calorie calculators  3. Medication: refilled naltrexone. Discontinued bupropion due to poor sleep while using.  4.  Stress Reduction:  Finding active hobbies/exerise can help  5. Goals: Maintaining 210-215 lbs. If going well over the next 3-6 months we'll follow up on an as needed bases and recommend refocused efforts if weight gets above 220lbs.  Semaglutide (Wegovy) could be a tool for future use if needed.      How much activity does it take to burn off the occasional treat?  occasional treat/snack or indulgence doesn't have to set back your weight loss season goals. But if the occasional treat turns into the daily chocolate habit or the chips after dinner, can of soda then your progress willslow dramatically unless your activity and exercise can compensate for those empty, low nutrition/high calorie foods.  Here's some estimations of walking off your snacks depending on general weights.  You can use \"calorieburn calculators\" if you search in [employer], more accurate estimations should ask height/weight/gender and if you have a smart watch/heart rate monitor fitness watch then your personal burn rate will be much more accurate thenthese general numbers, but these are in the ballpark.  http://[email or web address] Videoloriecalculator.com/glrbppel-getqbh-zcyzldxqep/ is a nice reference for many different activities with just entering weight and time spent doing anactivity.    Most prepackaged snacks/treats or 12 oz cans of soda come in around 150 kcal (small bag of chips (11-12 chips), 2 Tollhouse Cookies, 12 oz Coke, 5 oz of wine(125 kcal), 2 oz Vodka (130kcal),etc).  To burnoff this " "snack/indulgence walking at a \"walking the dog\" pace or for most people that aren't in training/fitness mode, about 2.5 MPH. If you move slower than this, you'll burn less calories. If you move faster than this,you'll burn more.     Calories burned are for a 30 minute walk, at 2.5 MPH (traveling 1.25 miles in 30 minutes):    Body Weight Calories Burned   175 lbs 120 kcal   200 lbs 137 kcal   225 lbs 153 kcal   250 lbs 171 kcal   300 lbs 204 kcal       Speed, intensity, hills and activity type (walk/bike/swim/chores/yardwork/etc) will determine how much energy you burn per hour.  As you can see, for most people, a small snack of 150 kcal is a 30 minute commitment to moving at a modest pace.  A quick stroll. In comparison, most people moving at a \"missing the bus\" walk pace of about 4 mph (or the pace that a fit person may  walkin a marathon race if they can't run anymore).    30 minutes at 4 MPH speedy walk/slow jog on level ground burn rate (2 miles covered in 30 minutes):    Body Weight Calories Burned   175 lbs 198 kcal   200 lbs 227 kcal   225 lbs 255 kcal   250 lbs 283 kcal   300 lbs 340 kcal.     Finding loops in your neighborhood is easy to map out distances by using sites like, MapMyRun.com, MapMyRide.com, or Strava.com.    Get out there and earn it, burn it, or pay it forward.  Banking calories is always a good idea if you know an occasion is coming up where you plan to indulge. The goal for all adults around the work is at [id number] minutes weekly of moderate aerobic activity like those listed above (keeping heart rate at about 50-65% of your maximum heart rate calculation (roughly 220-Age in years (as long as not on heart slowing medicationslike Beta Blockers)). Hopefully, this drives home the point that snacks need to be intentional during weight loss season as most of us struggle with finding 30-60minutes most days a week to exercise and it takes more thanthat in many cases to make up for the " "sweet/treats and indulgences that may have contributed to your weight gain over the years (roughly, a can of soda daily for a year will put a person at risk for a 10-15 lbweight gain each year).  WEIGHT MAINTENANCE STRATEGIES AFTER WEIGHT LOSS SEASON    According to the National Weight Control Registry there are several things that people who have lost weight and kept it off have in common. Some of them are...    1. 3 MEALS A DAY:  Make sure you are eating 3 meals each day.  No skipping meals.  80% of people who skip meals are overweight or obese.  Missing meals slows the metabolism, making it harder to maintain a healthy weight.  Starting the day with some protein (10-20 Grams minimum like an egg and some yogurt) is crucial for avoiding afternoon hunger.     2. FOOD DIARY:  Much like keeping a ledger for your checkbook, keeping a food and exercise diary helps you \"keep track\" of the balance of energy (calories) in and energy out. It also helps you recognize potential unhealthy deviations from healthy patterns before they become habit. It's a nice way to monitor whether you are getting the protein, fiber, and other nutrients that your body needs.  It also provides a reference for figuring out why things are getting off track and learning how YOUR body loses/hold on to it's weight.  This tool is cheap and research has proven it to be one of the biggest helps when making a diet/behavior change.    3. FOLLOW-UP:  Studies show that those who follow up with their health professional regularly maintained their weight loss and those who are \"lost to follow-up \"are at risk for regain.  Moreover, it is essential to monitor vitamin levels with laboratory studies for life following gastric bypass surgery. If your frustrated or feeling defeated that is the time to work with us rather than stop the program.    4.  HIGH FIBER/LOW FAT:  Lean sources of protein (skim milk, skinless, baked or broiled chicken breast, fish, etc.)  " "will help you meet your protein needs while fruits, vegetables, and whole grains will help you get the fiber that your body needs.  This is heart healthy eating and helps to keep calorie levels in balance.  Some fat is important in maintaining good so 2-3 tablespoons of olive oil, 1 oz of unflavored nuts (almonds, brazil nuts, walnuts) daily is a good way to get the good stuff or taking a teaspoon of fish oil daily ([employer] is a good brand, lemon flavored isn't too fishy).    5.  8,000 STEPS PER DAY AND AT LEAST TWICE WEEKLY STRENGTH TRAINING:  This is a \"weight maintenance dose. \"  It is essential to get your steps in every day, 7 days a week.  You don't have to \"work out \" 7 days a week, but throughout the day, getting 8000 steps will help you maintain the weight you have lost. Trying to make at least 5516-3879 steps daily at a moderate to brisk pace (about to miss the bus pace). Parking far away, taking the stairs instead of the elevator, and pacing while on the phone are some ways to help achieve this goal.  A good rule of thumb is that it takes about 100 kilocalories of exercise each day for every 5% reduction in weight during weight loss season to off set the slowed metabolism and reduce the risk of weight regain.  Since we can usually only restrict our diet so much for so long, exercise makes it easier to maintain on the days that we're not perfect with our diet.    6.  Eat at home 90% OF THE TIME:  People who maintain a healthy weight eat at home, or meal prepared at home, 90% of the time.  Studies show that people consume an average of 770 winston when eating out at a restaurant and 440 winston when eating a meal prepared at home.  This equates to almost 35 pounds of excess weight for a person who eats out once a day for 1 year.      7.  Have a reward.  People that are working towards a major habit/behavior change like weight loss do better and complete the program when they have a reward they are working " "towards.  It should be special, something you wouldn't otherwise allow your self and be a non-food related reward. For example: a trip, a piece of jewelry, a special outfit, an adventure or outing (ideally one that uses your fitter/new body), a new bicycle...Ideally, it celebrates your weight loss and promotes a healthy life style.    8.  Eat Breakfast, bigger is better.  A study last year showed that people lose more weight if they have a large Breakfast, medium Lunch and smaller Dinner rather than vice versa.  When fueled well during the day, we tend to move more and snack less in the evening hours.  Try it!    OTHER HELPFUL HABITS    -Minimize liquid calories.  Water is indira, unsweetened or brewed tea is also minimal calorie.   Try to minimize reliance on artificial sweeteners, less or none is probably better.    -Avoiding \"mindless\" eating, i.e., eating at the TV, and the car, in front of the computer.  Eating should have a purpose of nourishment rather than something you do when bored.   By avoiding distracted eating, this generally cuts portions/servings/snacks 10-15%.      -For treats, stop eating when you no longer taste the treat.  Think about each bite and enjoy each bite rather than racing to the finish.    -Protect your sleep and Manage your stress.  Getting out for 20-60 minutes of walking/exercise/cycling/gardening/yardwork etc is a great way to shed the frustrations of the day and improve stress reduction and bedtime relaxation.    -Weigh Yourself every day if possible when trying to lose weight. Do it at the same time everyday, ideally in the morning before or after getting out of the shower.  Weight loss bounces up and down but over a 5-6 day period a downtrend should occur and if not, go back to the food and activity tracking and give me a call if any mysteries as to why things aren't going well and we'll look at your serving size estimations or other factors that might be impeding weight " loss.    -Let supper be your last food of the day and stick to water in the evenings at least 5 days out of the week.  Having a 12-14 hour period of fasting from supper to breakfast is quite beneficial for health/longevity/blood sugar levels and weight management as long as you're adequately fueled the other 12 hours of the day.    -For people with anxiety, stress or relaxation problems, I recommend the following breathing technique:    4,7,8 breathing is a non medication based way to decrease stress and anxiety symptoms, improve sleeping issues.  It can be done as often as necessary but I recommend at to perform it idealy at bedtime and first thing in the morning to keep your stress response in a reasonable range.  To perform 4, 7, 8 breathin.  Place the tip of your tongue lightly behind the gum of your front teeth.   2.  Breath in through your mouth for 4 seconds.  3.  Hold your breath for 7 seconds.  4.  Keeping the tongue in place, breath out through your mouth with a slight hissing noise for 8 seconds.    Repeat 4 times.  Do this when going to bed and waking up each day, or if any high stress situation arises and your stress hormones will come down.          OPTIMIZING YOUR METABOLISM FOR LIFE    1.  MUSCLE MAINTENANCE: Muscle burns calories up to 70% better than fat.  As we age our body composition changes. We lose muscle mass.  Weight training can help us keep and even build muscle mass.  Dumbbells, pushups, rubber band training, weight machines are all examples of ways to keep and/or build muscle mass.    2.  MOVE: 8000 steps daily has been shown to be a weight maintenance dose.  Aim for 10,000 steps each day. Helpfull habits include taking the stairs instead of the elevator when possible, parking at the far end of the parking lot, pacing while on the phone, and taking the dog for a walk.  Of course using a treadmill, stair climber, elliptical , and bicycle are all ways of getting 10,000 steps.   "If you track activity it helps motivate further activity. It's recommended to make 20-25% of your steps each day at a brisk pace (about to miss the bus pace).  If you've lost a lot of weight, we need about  kcal of exercise most days of the week for every 5% total body weight reduction achieved to reduce the risk of regaining significant weight.  There are many activity/calorie counters available on the Internet for free.    3.  3 MEALS EACH DAY: Make sure to get your 3 meals each day.  Skipping breakfast, working through your lunch, or not eating dinner will lead to a slowing of your metabolism, increased hunger and difficulty w/ portion control/cravings. Studies show that 80% of people who skip meals are overweight or obese. Using food as medicine throughout the day to balance hunger drives/urges helps greatly.    4.  ADEQUATE PROTEIN INTAKE: Getting adequate protein is beneficial for a number of reasons: to aid the healing process, to blunt cravings immediately after eating and for a period of time after eating, to help keep blood sugars level, and to help you maintain your muscle mass.  See #1 above.  Eggs/meat/yogurt (greek/low carb) in the a.m.  Tuna/meat/fish/yogurt at lunch.  Meat, fish, beans, lentils are all good dinner options or other meals.  Women should get 60-90 grams of protein daily, Men  grams depending on size/muscle mass and goals as well as health history/kidney function.      10 Rules to Live By    1. Come back to earth   Try to choose the least processed forms of food--fruits, vegetables, whole grains, and high-fiber carbohydrates. Learn about \"the dirty dozen and clean fifteen\" regarding which foods may be best to spend extra on organic sourced foods.    2. Eat a rainbow often   Eat fruits or vegetables with each meal. Choose a wide variety of colors for the biggest benefit.    3. Choose lean protein  Include a lean protein source (15-20gm) with each meal. Snacks: pair a protein " source with carbohydrate ie: sting cheese with a pear; peanut butter with an apple; or tuna with whole grain crackers.  If you can afford it, grass fed animal proteins have a healthier fat content than grain fed animals.    4. Pick fats that give you something back   Include healthy fats in your diet, such as olive oil, tree nuts such as almonds, walnuts, brazil nuts, cashews, pecans and pistachios; seeds (sunflower/pumpkin/dylan),  avocado, and fish (salmon,sardines, tuna)  It's hard to overeat these if in their natural forms. Generally, the more you eat, the healthier you are and the chery you feel.    5. Eat breakfast every day!  Start your day out right. Include a protein source, whole grains, with fruit or vegetable.     6. Choose 3 foods group with each meal  Aim for all three nutrients (whole carbs, lean protein, and healthy fat).  Example: Turkey sandwich (Whole wheat bread, 2oz turkey breast, 1 slice reduced fat cheese) with small side salad.    7. Stay hydrated   Dehydration means decreased performance (0.5-1.0 fluid ounces [fl oz]×body weight=fl oz of water/day). Start drinking fluids early and often!    8. Do not waste your workout   Have a pre workout snack, if you didn t just eat a balanced meal. After the workout or game, if it s longer than 60 minutes of activity, have a carbohydrate/protein recovery snack, such as 16-24 fl oz of low-fat chocolate milk or greek yogurt, followed by a balanced meal of protein and vegetable and whole grains. Don't overestimate your needs.    9. Supplement wisely  Fuel first and supplement second. If you are not getting what you need through food; and before you take any supplement, check with a registered dietitian or physician for further evaluation. He or she can recommend additional supplementation if needed, based upon your individual intake and lab results. Supplements are not for everyone and can do more harm than good without proper medical advisement.    10.   Sleep   The body recovers and repairs best when at rest! Aim for 7-8 hours of sleep.  If you snore loudly, wake but don't feel refreshed or fall asleep during the day, need many naps, you may have a sleep disorder or sleep apnea and a sleep study may be helpful.      The 80/20 rule for Maintenance  Each meal and snack is an opportunity to fuel your body optimally. Choose foods that are best for you 80% of the time and incorporate some of those foods that are maybe not the best, but may maybe your favorite, 20% of the time once you've reached your weight goals!

## 2021-09-01 NOTE — PROGRESS NOTES
"  The patient has been notified of following:     \"This telephone visit will be conducted via a call between you and your physician/provider. We have found that certain health care needs can be provided without the need for a physical exam.  This service lets us provide the care you need with a short phone conversation.  If a prescription is necessary we can send it directly to your pharmacy.  If lab work is needed we can place an order for that and you can then stop by our lab to have the test done at a later time.    Telephone visits are billed at different rates depending on your insurance coverage. During this emergency period, for some insurers they may be billed the same as an in-person visit.  Please reach out to your insurance provider with any questions.    If during the course of the call the physician/provider feels a telephone visit is not appropriate, you will not be charged for this service.\"    Patient has given verbal consent to a Telephone visit? Yes    What phone number would you like to be contacted at? [phone]    Patient would like to receive their AVS by Che    Additional provider notes:   Bariatric Clinic Follow-Up Visit:    Raheem Jeong is a 51 year old  male with Body mass index is 33.2 kg/m .  presenting here today for follow-up on non-surgical efforts for weight loss. Original Intake visit occurred on 12/16/20 with a weight of 249 lbs and BMI of 39.  Along with diet and behavior changes, he has been using naltrexone and bupropion (added bupropion at last visit in June) to assist his weight loss goals.  See his intake visit notes for details on identified contributors to weight gain in the past. Chart review shows Dietician calculated RMR of 1952 and protein intake goal of 87-116g/day.    Weight:   Wt Readings from Last 3 Encounters:   09/01/21 96.2 kg (212 lb)   06/24/21 96.2 kg (212 lb)   05/25/21 97.5 kg (215 lb)    pounds      Comorbidities:  Patient Active Problem List " "  Diagnosis     CARDIOVASCULAR SCREENING; LDL GOAL LESS THAN 160     Podagra     Adjustment disorder with depressed mood     ANTHONY (obstructive sleep apnea)       Current Outpatient Medications:      cholecalciferol 25 MCG (1000 UT) TABS, Take 1,000 Units by mouth daily, Disp: , Rfl:      fexofenadine (ALLEGRA) 180 MG tablet, Take 180 mg by mouth daily, Disp: , Rfl:      loratadine (CLARITIN) 10 MG tablet, Take 10 mg by mouth daily, Disp: , Rfl:      multivitamin, therapeutic with minerals (MULTI-VITAMIN) TABS tablet, Take 1 tablet by mouth daily, Disp: , Rfl:      naltrexone (DEPADE/REVIA) 50 MG tablet, Take 1 tablet by mouth daily, Disp: , Rfl:      solifenacin (VESICARE) 5 MG tablet, Take 1 tablet by mouth every 24 hours, Disp: , Rfl:      tadalafil (CIALIS) 5 MG tablet, Take 1 tablet by mouth daily, Disp: , Rfl:       Interim: Since our last visit, he has reported stable weight since June at 212 lbs.  Maintaining 37 lb reduction in weight. \"hard time w/ diet\", more \"cheating\". Not eating much extra at work and avoiding regular pizza/ice cream. Reduced to once daily weekly from multiple  Didn't tolerate bupropion due to sleep effects. No etoh or nicotine.  Exercise: weekends will do fairs/walking.   Some walking at work.   Reviewed metabolic slowing and need for exercise averaging about 2000kcal/week given his nearly 15% total body weight reduction. Pretty sedentary currently at home and work.   Finds naltrexone very helpful, no ill effects so refill given. Didn't tolerate bupropion. In the past didn't find Plenity helpful.    Plan:   1.  Diet: aiming to maintain a weight around 210-215 lbs the next 6 months. Continue mindful eating/protein focus and avoiding sweet 5-6 days/week.  2. Exercise: to maintain your 15% body weight reduction, aiming for 2000kcal/week of activity/exercise/active hobbies will reduce risk of large weight gain over the next year. See below for links to calorie calculators  3. Medication: " refilled naltrexone. Discontinued bupropion due to poor sleep while using.  4.  Stress Reduction:  Finding active hobbies/exerise can help  5. Goals: Maintaining 210-215 lbs. If going well over the next 3-6 months we'll follow up on an as needed bases and recommend refocused efforts if weight gets above 220lbs.  Semaglutide (Wegovy) could be a tool for future use if needed.  6. Recheck 10-12 weeks.    We discussed [facility] including:  -eating 3 meals daily  -reviewed metabolic needs for weight loss based on Resting Metabolic Rate  -protein goals supportive of healthy weight loss  -avoiding/limiting calorie containing beverages  -We discussed the importance of restorative sleep and stress management in maintaining a healthy weight.  -We discussed the National Weight Control Registry healthy weight maintenance strategies and ways to optimize metabolism.  -We discussed the importance of physical activity including cardiovascular and strength training in maintaining a healthier weight and explored viable options.    Most recent labs:  Lab Results   Component Value Date    WBC 6.3 01/20/2021    HGB 14.5 01/20/2021    HCT 41.8 01/20/2021    MCV 88 01/20/2021     01/20/2021     Lab Results   Component Value Date    CHOL 152 05/03/2021     Lab Results   Component Value Date    HDL 37 (L) 05/03/2021     No components found for: LDLCALC  Lab Results   Component Value Date    TRIG 66 05/03/2021     No components found for: CHOLHDL  Lab Results   Component Value Date    ALT 17 05/03/2021    AST 18 05/03/2021    ALKPHOS 78 05/03/2021     No results found for: HGBA1C  No components found for: [id number]  No components found for: [id number]  No components found for: FERRITIN  No components found for: PTH  No components found for: 70849  No components found for: 7597  Lab Results   Component Value Date    TSH 0.33 01/20/2021     No results found for: TESTOSTERONE    DIETARY HISTORY    Positive Changes  "Since Last Visit: maintaining  Struggling With: lack of exercise    Knowledgeable in Reading Food Labels: yes  Getting Adequate Protein: often  Sleeping 7-8 hours/day often, works 3rd shift  Stress management sometimes getting together with friends    PHYSICAL ACTIVITY PATTERNS:  Cardiovascular: minimal  Strength Training: minimal    REVIEW OF SYSTEMS  Feels well..  PHYSICAL EXAM:  Vitals: Ht 1.702 m (5' 7\")   Wt 96.2 kg (212 lb)   BMI 33.20 kg/m    Weight:   Wt Readings from Last 3 Encounters:   09/01/21 96.2 kg (212 lb)   06/24/21 96.2 kg (212 lb)   05/25/21 97.5 kg (215 lb)         GEN: Pleasant, mildly fatigued sounding on phone. No cough.  Medical Decision Making:  Interim study results: none..      25 minutes spent on the date of the encounter doing chart review, history and exam, documentation and further activities per the note   Landen Shepherd MD  [facility]  8:15 AM  9/1/2021      8:38 AM    "

## 2021-09-12 ENCOUNTER — MYC MEDICAL ADVICE (OUTPATIENT)
Dept: FAMILY MEDICINE | Facility: CLINIC | Age: 51
End: 2021-09-12

## 2021-09-12 DIAGNOSIS — J31.0 CHRONIC RHINITIS: Primary | ICD-10-CM

## 2021-09-13 RX ORDER — FLUTICASONE PROPIONATE 50 MCG
1 SPRAY, SUSPENSION (ML) NASAL DAILY
Qty: 16 G | Refills: 3 | Status: SHIPPED | OUTPATIENT
Start: 2021-09-13 | End: 2022-08-08

## 2021-10-23 ENCOUNTER — HEALTH MAINTENANCE LETTER (OUTPATIENT)
Age: 51
End: 2021-10-23

## 2021-10-27 ENCOUNTER — TRANSFERRED RECORDS (OUTPATIENT)
Dept: HEALTH INFORMATION MANAGEMENT | Facility: CLINIC | Age: 51
End: 2021-10-27
Payer: COMMERCIAL

## 2021-11-15 DIAGNOSIS — R11.0 NAUSEA: Primary | ICD-10-CM

## 2021-11-15 RX ORDER — SCOLOPAMINE TRANSDERMAL SYSTEM 1 MG/1
1 PATCH, EXTENDED RELEASE TRANSDERMAL
Qty: 12 PATCH | Refills: 1 | Status: SHIPPED | OUTPATIENT
Start: 2021-11-15 | End: 2022-05-05

## 2022-01-11 ENCOUNTER — IMMUNIZATION (OUTPATIENT)
Dept: NURSING | Facility: CLINIC | Age: 52
End: 2022-01-11
Payer: COMMERCIAL

## 2022-01-11 PROCEDURE — 0054A COVID-19,PF,PFIZER (12+ YRS): CPT

## 2022-01-11 PROCEDURE — 91305 COVID-19,PF,PFIZER (12+ YRS): CPT

## 2022-01-12 VITALS — HEIGHT: 67 IN | WEIGHT: 215 LBS | BODY MASS INDEX: 33.74 KG/M2

## 2022-01-18 VITALS — BODY MASS INDEX: 39.08 KG/M2 | WEIGHT: 249 LBS | HEIGHT: 67 IN

## 2022-01-18 VITALS
SYSTOLIC BLOOD PRESSURE: 117 MMHG | DIASTOLIC BLOOD PRESSURE: 82 MMHG | RESPIRATION RATE: 14 BRPM | OXYGEN SATURATION: 93 % | HEART RATE: 101 BPM | BODY MASS INDEX: 38.17 KG/M2 | TEMPERATURE: 99.1 F | WEIGHT: 243.7 LBS

## 2022-01-18 VITALS — BODY MASS INDEX: 37.12 KG/M2 | WEIGHT: 237 LBS

## 2022-01-18 VITALS
WEIGHT: 219 LBS | DIASTOLIC BLOOD PRESSURE: 78 MMHG | SYSTOLIC BLOOD PRESSURE: 110 MMHG | HEART RATE: 86 BPM | OXYGEN SATURATION: 97 % | BODY MASS INDEX: 33.19 KG/M2 | HEIGHT: 68 IN

## 2022-01-18 VITALS — BODY MASS INDEX: 35.4 KG/M2 | WEIGHT: 226 LBS

## 2022-05-05 ENCOUNTER — OFFICE VISIT (OUTPATIENT)
Dept: FAMILY MEDICINE | Facility: CLINIC | Age: 52
End: 2022-05-05
Payer: COMMERCIAL

## 2022-05-05 VITALS
HEIGHT: 67 IN | SYSTOLIC BLOOD PRESSURE: 112 MMHG | OXYGEN SATURATION: 94 % | HEART RATE: 68 BPM | TEMPERATURE: 98 F | DIASTOLIC BLOOD PRESSURE: 80 MMHG | WEIGHT: 236.5 LBS | BODY MASS INDEX: 37.12 KG/M2 | RESPIRATION RATE: 20 BRPM

## 2022-05-05 DIAGNOSIS — R19.5 MUCOUS IN STOOLS: Primary | ICD-10-CM

## 2022-05-05 DIAGNOSIS — J31.0 CHRONIC RHINITIS: ICD-10-CM

## 2022-05-05 LAB
ALBUMIN SERPL-MCNC: 4.3 G/DL (ref 3.5–5)
ALP SERPL-CCNC: 60 U/L (ref 45–120)
ALT SERPL W P-5'-P-CCNC: 14 U/L (ref 0–45)
ANION GAP SERPL CALCULATED.3IONS-SCNC: 13 MMOL/L (ref 5–18)
AST SERPL W P-5'-P-CCNC: 19 U/L (ref 0–40)
BILIRUB SERPL-MCNC: 0.4 MG/DL (ref 0–1)
BUN SERPL-MCNC: 6 MG/DL (ref 8–22)
CALCIUM SERPL-MCNC: 10.1 MG/DL (ref 8.5–10.5)
CHLORIDE BLD-SCNC: 102 MMOL/L (ref 98–107)
CO2 SERPL-SCNC: 26 MMOL/L (ref 22–31)
CREAT SERPL-MCNC: 0.86 MG/DL (ref 0.7–1.3)
ERYTHROCYTE [DISTWIDTH] IN BLOOD BY AUTOMATED COUNT: 12.4 % (ref 10–15)
GFR SERPL CREATININE-BSD FRML MDRD: >90 ML/MIN/1.73M2
GLUCOSE BLD-MCNC: 99 MG/DL (ref 70–125)
HCT VFR BLD AUTO: 41.9 % (ref 40–53)
HGB BLD-MCNC: 14.9 G/DL (ref 13.3–17.7)
MCH RBC QN AUTO: 30.9 PG (ref 26.5–33)
MCHC RBC AUTO-ENTMCNC: 35.6 G/DL (ref 31.5–36.5)
MCV RBC AUTO: 87 FL (ref 78–100)
PLATELET # BLD AUTO: 276 10E3/UL (ref 150–450)
POTASSIUM BLD-SCNC: 4.2 MMOL/L (ref 3.5–5)
PROT SERPL-MCNC: 8.1 G/DL (ref 6–8)
RBC # BLD AUTO: 4.82 10E6/UL (ref 4.4–5.9)
SODIUM SERPL-SCNC: 141 MMOL/L (ref 136–145)
TSH SERPL DL<=0.005 MIU/L-ACNC: 0.99 UIU/ML (ref 0.3–5)
WBC # BLD AUTO: 6.7 10E3/UL (ref 4–11)

## 2022-05-05 PROCEDURE — 84443 ASSAY THYROID STIM HORMONE: CPT | Performed by: FAMILY MEDICINE

## 2022-05-05 PROCEDURE — 85027 COMPLETE CBC AUTOMATED: CPT | Performed by: FAMILY MEDICINE

## 2022-05-05 PROCEDURE — 99213 OFFICE O/P EST LOW 20 MIN: CPT | Performed by: FAMILY MEDICINE

## 2022-05-05 PROCEDURE — 36415 COLL VENOUS BLD VENIPUNCTURE: CPT | Performed by: FAMILY MEDICINE

## 2022-05-05 PROCEDURE — 80053 COMPREHEN METABOLIC PANEL: CPT | Performed by: FAMILY MEDICINE

## 2022-05-05 RX ORDER — FEXOFENADINE HCL 180 MG/1
180 TABLET ORAL DAILY
Qty: 90 TABLET | Refills: 3 | Status: SHIPPED | OUTPATIENT
Start: 2022-05-05 | End: 2022-08-08

## 2022-05-05 NOTE — PROGRESS NOTES
"  Assessment & Plan     Mucous in stools  We discussed broad differential diagnosis.  No symptoms of significant or severe inflammation to suggest underlying inflammatory bowel syndrome though this remains in the differential.  Unremarkable colonoscopy suggest against this as well.  No underlying cause identified.  Will check for thyroid imbalance, kidney or liver changes, inflammation with CBC, will check stool studies both for lactoferrin suggestive white blood cells and inflammation and for O&P.  Advised initiation of probiotic, addition of fiber supplement.  Keep appointment with gastroenterology as scheduled.  - TSH with free T4 reflex; Future  - Comprehensive metabolic panel; Future  - CBC with platelets; Future  - STOOL CX O&P INFORMATION  - Fecal Lactoferrin; Future  - TSH with free T4 reflex  - Comprehensive metabolic panel  - CBC with platelets  - Ova and Parasite Exam Routine; Future    Chronic rhinitis  Refill provided of fexofenadine.  - fexofenadine (ALLEGRA) 180 MG tablet; Take 1 tablet (180 mg) by mouth daily             BMI:   Estimated body mass index is 37.04 kg/m  as calculated from the following:    Height as of this encounter: 1.702 m (5' 7\").    Weight as of this encounter: 107.3 kg (236 lb 8 oz).           Return in about 2 months (around 7/5/2022) for Annual Preventive Care Visit with Dr. Robertson.    Emma Hdz MD  [facility] [place] Maciel is a 51 year old who presents for the following health issues     Evaluated in clinic due to mucus in his stool, sometimes 2-3 clumps of mucus that are equal in size in diameter, present over the last 3 to 4 months.  Nothing that seem to bring it out.  No antibiotics preceding.  No change in diet to though about a year ago had dentures and so has been eating a little less healthy than previously.  Denies constipation or diarrhea, stools always formed.  Sometimes having urgency.  Describes 5-6 stools per day.  They are " "perhaps a little narrower a bit about an inch in diameter.  Minimal cramping.  Perhaps mild increase in heartburn over the last 2 months.  He describes weight gain of about 8 pounds over the last 2 months as well.  Has appointment with gastroenterology scheduled in July.  He had colonoscopy 1 year ago with adenomatous polyps with follow-up recommended in 3 years.  Increase in refill of fexofenadine.             Review of Systems         Objective    /80   Pulse 68   Temp 98  F (36.7  C) (Oral)   Resp 20   Ht 1.702 m (5' 7\")   Wt 107.3 kg (236 lb 8 oz)   SpO2 94%   BMI 37.04 kg/m    Body mass index is 37.04 kg/m .  Physical Exam   Alert and very pleasant male.  Abdomen is soft, nontender, nondistended.  Extremities without edema.  Neck supple without lymphadenopathy or thyromegaly.                "

## 2022-05-05 NOTE — PATIENT INSTRUCTIONS
Add a fiber supplement such as Metamucil, Citrucel, or psyllium.  Follow instructions on the bottle.  Consider adding a probiotic such as Culturelle or Florastor.  We will assess for inflammation and infection in your stool.  We will check your thyroid, blood counts, and kidney and liver function to see if there are underlying causes.  Keep your appointment with gastroenterology in July as scheduled.

## 2022-05-06 ENCOUNTER — LAB (OUTPATIENT)
Dept: LAB | Facility: HOSPITAL | Age: 52
End: 2022-05-06
Payer: COMMERCIAL

## 2022-05-06 DIAGNOSIS — R19.5 MUCOUS IN STOOLS: ICD-10-CM

## 2022-05-06 LAB — LACTOFERRIN STL QL IA: NEGATIVE

## 2022-05-06 PROCEDURE — 83630 LACTOFERRIN FECAL (QUAL): CPT

## 2022-05-06 PROCEDURE — 87209 SMEAR COMPLEX STAIN: CPT

## 2022-05-09 LAB — O+P STL MICRO: NEGATIVE

## 2022-06-04 ENCOUNTER — HEALTH MAINTENANCE LETTER (OUTPATIENT)
Age: 52
End: 2022-06-04

## 2022-08-08 DIAGNOSIS — J31.0 CHRONIC RHINITIS: ICD-10-CM

## 2022-08-08 DIAGNOSIS — N52.9 PRIMARY ERECTILE DYSFUNCTION: Primary | ICD-10-CM

## 2022-08-08 RX ORDER — FLUTICASONE PROPIONATE 50 MCG
1 SPRAY, SUSPENSION (ML) NASAL DAILY
Qty: 16 G | Refills: 3 | Status: SHIPPED | OUTPATIENT
Start: 2022-08-08 | End: 2023-11-12

## 2022-08-08 RX ORDER — FEXOFENADINE HCL 180 MG/1
180 TABLET ORAL DAILY
Qty: 90 TABLET | Refills: 3 | Status: SHIPPED | OUTPATIENT
Start: 2022-08-08 | End: 2023-11-12

## 2022-08-08 RX ORDER — TADALAFIL 5 MG/1
5 TABLET ORAL DAILY
Qty: 20 TABLET | Refills: 10 | Status: SHIPPED | OUTPATIENT
Start: 2022-08-08 | End: 2022-11-03

## 2022-08-08 NOTE — TELEPHONE ENCOUNTER
Last clinic visit: 5/5/2022    Clinic visit frequency required: Q 6  months    Next clinic visit: 11/03/2022- establish care w/ Eduardo    Pending Prescriptions:                       Disp   Refills    fexofenadine (ALLEGRA) 180 MG tablet      90 tab*3            Sig: Take 1 tablet (180 mg) by mouth daily    fluticasone (FLONASE) 50 MCG/ACT nasal sp*16 g   3            Sig: Spray 1 spray into both nostrils daily    tadalafil (CIALIS) 5 MG tablet                                Sig: Take 1 tablet (5 mg) by mouth daily

## 2022-08-08 NOTE — TELEPHONE ENCOUNTER
Reason for Call:  Medication or medication refill:    Do you use a [facility]?  Name of the pharmacy and phone number for the current request:  Pharmacy Pended    Name of the medication requested:  -Fexofenadine (ALLEGRA) 180 MG tablet  -fluticasone 50 mcg  -Tadalafil 5 mg     Other request: Patient has enough for the month. Will need refills until his physical in Nov with Dr. Clark. Was a patient of [facility].    Can we leave a detailed message on this number? YES    Phone number patient can be reached at: Cell number on file:    Telephone Information:   Mobile [phone]       Best Time: anytime    Call taken on 8/8/2022 at 8:51 AM by Neftaly Lomas

## 2022-09-23 NOTE — TELEPHONE ENCOUNTER
Left message to call back for: pt  Information to relay to patient:  2nd message to schedule physical with pcp.   3 = A little assistance

## 2022-10-05 ENCOUNTER — TRANSFERRED RECORDS (OUTPATIENT)
Dept: HEALTH INFORMATION MANAGEMENT | Facility: CLINIC | Age: 52
End: 2022-10-05

## 2022-10-09 ENCOUNTER — HEALTH MAINTENANCE LETTER (OUTPATIENT)
Age: 52
End: 2022-10-09

## 2022-11-03 ENCOUNTER — OFFICE VISIT (OUTPATIENT)
Dept: FAMILY MEDICINE | Facility: CLINIC | Age: 52
End: 2022-11-03
Payer: COMMERCIAL

## 2022-11-03 VITALS
WEIGHT: 233 LBS | HEART RATE: 100 BPM | SYSTOLIC BLOOD PRESSURE: 120 MMHG | BODY MASS INDEX: 35.31 KG/M2 | HEIGHT: 68 IN | DIASTOLIC BLOOD PRESSURE: 72 MMHG | OXYGEN SATURATION: 96 %

## 2022-11-03 DIAGNOSIS — N52.9 PRIMARY ERECTILE DYSFUNCTION: ICD-10-CM

## 2022-11-03 DIAGNOSIS — Z13.21 ENCOUNTER FOR VITAMIN DEFICIENCY SCREENING: ICD-10-CM

## 2022-11-03 DIAGNOSIS — T75.3XXD MOTION SICKNESS, SUBSEQUENT ENCOUNTER: ICD-10-CM

## 2022-11-03 DIAGNOSIS — E66.01 MORBID OBESITY (H): ICD-10-CM

## 2022-11-03 DIAGNOSIS — Z13.1 DIABETES MELLITUS SCREENING: ICD-10-CM

## 2022-11-03 DIAGNOSIS — Z12.5 SCREENING FOR PROSTATE CANCER: ICD-10-CM

## 2022-11-03 DIAGNOSIS — Z00.00 ROUTINE ADULT HEALTH MAINTENANCE: Primary | ICD-10-CM

## 2022-11-03 DIAGNOSIS — Z13.220 LIPID SCREENING: ICD-10-CM

## 2022-11-03 PROBLEM — R19.8 IRREGULAR BOWEL HABITS: Status: ACTIVE | Noted: 2022-10-05

## 2022-11-03 LAB
CHOLEST SERPL-MCNC: 162 MG/DL
DEPRECATED CALCIDIOL+CALCIFEROL SERPL-MC: 38 UG/L (ref 20–75)
HBA1C MFR BLD: 5.4 % (ref 0–5.6)
HDLC SERPL-MCNC: 40 MG/DL
LDLC SERPL CALC-MCNC: 89 MG/DL
NONHDLC SERPL-MCNC: 122 MG/DL
PSA SERPL-MCNC: 0.73 NG/ML (ref 0–3.5)
TRIGL SERPL-MCNC: 167 MG/DL

## 2022-11-03 PROCEDURE — 83036 HEMOGLOBIN GLYCOSYLATED A1C: CPT | Performed by: FAMILY MEDICINE

## 2022-11-03 PROCEDURE — 99213 OFFICE O/P EST LOW 20 MIN: CPT | Mod: 25 | Performed by: FAMILY MEDICINE

## 2022-11-03 PROCEDURE — 90471 IMMUNIZATION ADMIN: CPT | Performed by: FAMILY MEDICINE

## 2022-11-03 PROCEDURE — G0103 PSA SCREENING: HCPCS | Performed by: FAMILY MEDICINE

## 2022-11-03 PROCEDURE — 99396 PREV VISIT EST AGE 40-64: CPT | Mod: 25 | Performed by: FAMILY MEDICINE

## 2022-11-03 PROCEDURE — 90682 RIV4 VACC RECOMBINANT DNA IM: CPT | Performed by: FAMILY MEDICINE

## 2022-11-03 PROCEDURE — 82306 VITAMIN D 25 HYDROXY: CPT | Performed by: FAMILY MEDICINE

## 2022-11-03 PROCEDURE — 36415 COLL VENOUS BLD VENIPUNCTURE: CPT | Performed by: FAMILY MEDICINE

## 2022-11-03 PROCEDURE — 80061 LIPID PANEL: CPT | Performed by: FAMILY MEDICINE

## 2022-11-03 RX ORDER — SCOLOPAMINE TRANSDERMAL SYSTEM 1 MG/1
1 PATCH, EXTENDED RELEASE TRANSDERMAL
Qty: 4 PATCH | Refills: 0 | Status: SHIPPED | OUTPATIENT
Start: 2022-11-03 | End: 2023-11-14

## 2022-11-03 RX ORDER — NALTREXONE HYDROCHLORIDE 50 MG/1
50 TABLET, FILM COATED ORAL DAILY
Qty: 90 TABLET | Refills: 3 | Status: SHIPPED | OUTPATIENT
Start: 2022-11-03 | End: 2023-11-15

## 2022-11-03 RX ORDER — TADALAFIL 5 MG/1
5 TABLET ORAL DAILY
Qty: 90 TABLET | Refills: 3
Start: 2022-11-03 | End: 2023-02-07

## 2022-11-03 ASSESSMENT — ENCOUNTER SYMPTOMS
NERVOUS/ANXIOUS: 0
MYALGIAS: 0
WEAKNESS: 0
HEMATOCHEZIA: 0
HEARTBURN: 0
JOINT SWELLING: 0
FEVER: 0
ABDOMINAL PAIN: 0
COUGH: 0
CHILLS: 0
DIZZINESS: 0
NAUSEA: 0
CONSTIPATION: 0
ARTHRALGIAS: 1
SORE THROAT: 0
EYE PAIN: 0
FREQUENCY: 1
DYSURIA: 0
HEMATURIA: 0
PALPITATIONS: 0
DIARRHEA: 0
PARESTHESIAS: 0
HEADACHES: 0
SHORTNESS OF BREATH: 0

## 2022-11-03 NOTE — PROGRESS NOTES
Assessment/Plan:   Raheem is a 52 year old male here for physical with additional concerns.    Routine adult health maintenance  Physical completed today.  Labs as below.  Flu shot given today.  Up-to-date with colonoscopy.  - REVIEW OF HEALTH MAINTENANCE PROTOCOL ORDERS  - INFLUENZA QUAD, RECOMBINANT, P-FREE (RIV4) (FLUBLOK) AGE 50-64 [OCI640]    Lipid screening  Given age and weight, due for lipid screening today.  - Lipid panel reflex to direct LDL Non-fasting    Diabetes mellitus screening  Given age and weight, due for diabetes screening.  - Hemoglobin A1c    Screening for prostate cancer  Due for screening PSA, previously normal number of years ago.  - PSA, screen    Encounter for vitamin deficiency screening  We will screen for vitamin D deficiency today, patient is taking daily vitamin D.  - Vitamin D deficiency screening    Morbid obesity (H)  BMI 35.43 today.  Comorbid conditions include sleep apnea.  Previously worked with weight management clinic and is on naltrexone, he ran out of this medication but did feel like it was helpful, refill provided today.  - naltrexone (DEPADE/REVIA) 50 MG tablet  Dispense: 90 tablet; Refill: 3    Primary erectile dysfunction  Has seen [facility] for erectile and urinary issues, has been recommended to take Cialis daily, mentions possible need for surgery in the future, CALE signed today to obtain records.  - tadalafil (CIALIS) 5 MG tablet  Dispense: 90 tablet; Refill: 3    Motion sickness, subsequent encounter  Patient has upcoming cruise, reports issues with motion sickness, scopolamine patch has worked for him in the past, refilled today.  - scopolamine (TRANSDERM) 1 MG/3DAYS 72 hr patch  Dispense: 4 patch; Refill: 0       I have had an Advance Directives discussion with the patient.  The following high BMI interventions were performed this visit: encouragement to exercise and lifestyle education regarding diet    Follow up: 1 year for physical, sooner as  needed    Giovanna Clark MD  [facility]      Subjective:     Raheem Jeong is a 52 year old male who presents for an annual exam.     Answers for HPI/ROS submitted by the patient on 11/3/2022  Frequency of exercise:: None  Getting at least 3 servings of Calcium per day:: Yes  Diet:: Regular (no restrictions)  Taking medications regularly:: Yes  Medication side effects:: None  Bi-annual eye exam:: Yes  Dental care twice a year:: Yes  Sleep apnea or symptoms of sleep apnea:: None  abdominal pain: No  Blood in stool: No  Blood in urine: No  chest pain: No  chills: No  congestion: No  constipation: No  cough: No  diarrhea: No  dizziness: No  ear pain: No  eye pain: No  nervous/anxious: No  fever: No  frequency: Yes  genital sores: No  headaches: No  hearing loss: Yes  heartburn: No  arthralgias: Yes  joint swelling: No  peripheral edema: No  mood changes: No  myalgias: No  nausea: No  dysuria: No  palpitations: No  Skin sensation changes: No  sore throat: No  urgency: Yes  rash: No  shortness of breath: No  visual disturbance: No  weakness: No  impotence: Yes  penile discharge: No  Additional concerns today:: Yes    [facility] on 10/5 for irregular bowel habits - has been doing just fiber now per their recommendation but felt like bowels were better on just probiotic, going to all and update him    Saw urology - using cialis daily - helping with ED but also urine flow - doing better now on this regimen    Had been working on weight loss, saw Dr Shepherd, was taking naltrexone (ran out)    Upcoming cruise - gets motion sickness    Health Maintenance reviewed:  Lipid Profile: needs  Glucose Screen: needs  Colonoscopy: up to date    Immunization History   Administered Date(s) Administered     COVID-19,PF,Pfizer (12+ Yrs) 03/19/2021, 04/10/2021     COVID-19,PF,Pfizer 12+ Yrs (2022 and After) 01/11/2022     Flu, Unspecified 10/01/2017, 12/26/2018, 10/20/2020     Influenza (H1N1) 02/11/2010     Influenza (IIV3) PF  10/31/2012, 11/21/2013     Influenza Vaccine IM > 6 months Valent IIV4 (Alfuria,Fluzone) 09/19/2014, 09/15/2015, 10/08/2016, 09/30/2020, 11/15/2021     Pneumococcal 23 valent 05/03/2021     Pneumococcal, Unspecified 04/15/2021     TDAP Vaccine (Boostrix) 12/20/2012     Tdap (Adacel,Boostrix) 12/20/2012     Zoster vaccine recombinant adjuvanted (SHINGRIX) 05/03/2021, 07/12/2021     Immunization status: flu shot today.    Current Outpatient Medications   Medication Sig Dispense Refill     cholecalciferol 25 MCG (1000 UT) TABS Take 1,000 Units by mouth daily       fexofenadine (ALLEGRA) 180 MG tablet Take 1 tablet (180 mg) by mouth daily 90 tablet 3     fluticasone (FLONASE) 50 MCG/ACT nasal spray Spray 1 spray into both nostrils daily 16 g 3     multivitamin w/minerals (THERA-VIT-M) tablet Take 1 tablet by mouth daily       naltrexone (DEPADE/REVIA) 50 MG tablet Take 1 tablet (50 mg) by mouth daily 90 tablet 3     scopolamine (TRANSDERM) 1 MG/3DAYS 72 hr patch Place 1 patch onto the skin every 72 hours 4 patch 0     tadalafil (CIALIS) 5 MG tablet Take 1 tablet (5 mg) by mouth daily 90 tablet 3     Past Medical History:   Diagnosis Date     Anxiety      CARDIOVASCULAR SCREENING; LDL GOAL LESS THAN 160 11/4/2011     History of ETOH abuse      ANTHONY (obstructive sleep apnea)     States he lost weight and no longer has     PONV (postoperative nausea and vomiting)      Sleep apnea      Past Surgical History:   Procedure Laterality Date     AS MUSC/TENDON REPAIR EACH; ARM/ELBOW Left     tendon injury - L arm     EYE SURGERY      lasik     HERNIA REPAIR Right 01/01/1988     OTHER SURGICAL HISTORY Left     knuckle surgerymiddle digit on left hand - due to OA     ZZHC COLONOSCOPY W/WO BRUSH/WASH N/A 05/25/2021    Procedure: COLONOSCOPY;  Surgeon: Octaviano Ambriz MD;  [facility];  Service: General     Patient has no known allergies.  Family History   Problem Relation Age of Onset     Pacemaker Mother       "Hypertension Mother      Diabetes Father      Mental Illness Sister         overdose     Cancer Brother         prostate     Diabetes Brother      Pacemaker Maternal Grandmother      Arthritis Maternal Grandfather         hip issues     No Known Problems Paternal Grandmother      No Known Problems Paternal Grandfather      Colon Cancer Maternal Aunt      Colon Cancer Maternal Uncle      Social History     Socioeconomic History     Marital status:      Spouse name: Not on file     Number of children: Not on file     Years of education: Not on file     Highest education level: Not on file   Occupational History     Occupation:      Employer: [employer]   Tobacco Use     Smoking status: Former     Types: Cigarettes     Quit date: 1998     Years since quittin.8     Smokeless tobacco: Never   Substance and Sexual Activity     Alcohol use: No     Comment: quit 8 years ()     Drug use: No     Sexual activity: Yes     Partners: Female   Other Topics Concern     Parent/sibling w/ CABG, MI or angioplasty before 65F 55M? Not Asked   Social History Narrative     Not on file     Social Determinants of Health     Financial Resource Strain: Not on file   Food Insecurity: Not on file   Transportation Needs: Not on file   Physical Activity: Not on file   Stress: Not on file   Social Connections: Not on file   Intimate Partner Violence: Not on file   Housing Stability: Not on file       Review of Systems negative unless noted    Objective:        Vitals:    22 0755   BP: 120/72   Pulse: 100   SpO2: 96%   Weight: 105.7 kg (233 lb)   Height: 1.727 m (5' 8\")     Body mass index is 35.43 kg/m .    Physical Exam:  General Appearance: Alert, pleasant, appears stated age, obese  Head: Normocephalic, without obvious abnormality  Eyes: PERRL, conjunctiva/corneas clear, EOM's intact  Ears: Normal TM's and external ear canals, both ears  Neck: Supple,without lymphadenopathy, no thyromegaly or nodules " noted  Lungs: Clear to auscultation bilaterally, respirations unlabored, no wheezing or crackles  Heart: Regular rate and rhythm, no murmur   Abdomen: Soft, non-tender, no masses, no organomegaly  Extremities: Extremities with strong and symmetric pulses, no cyanosis or edema  Skin: Skin color, texture normal, no rashes or lesions  Neurologic: Grossly normal, no focal deficits

## 2023-02-06 DIAGNOSIS — N52.9 PRIMARY ERECTILE DYSFUNCTION: ICD-10-CM

## 2023-02-06 NOTE — TELEPHONE ENCOUNTER
"Refill request being routed to PCP due to last script indicated as a 'No Print' - therefore uncertain of actual quantity used to date or intended quantity for continuation.  Thank you-    Last Written Prescription Date:  11/3/2022  Last Fill Quantity: 90,  # refills: 3   Last office visit provider:  11/3/2022     Requested Prescriptions   Pending Prescriptions Disp Refills     tadalafil (CIALIS) 5 MG tablet 90 tablet 3     Sig: Take 1 tablet (5 mg) by mouth daily       Erectile Dysfuction Protocol Passed - 2/6/2023 12:09 PM        Passed - Absence of nitrates on medication list        Passed - Absence of Alpha Blockers on Med list        Passed - Recent (12 mo) or future (30 days) visit within the authorizing provider's specialty     Patient has had an office visit with the authorizing provider or a provider within the authorizing providers department within the previous 12 mos or has a future within next 30 days. See \"Patient Info\" tab in inbasket, or \"Choose Columns\" in Meds & Orders section of the refill encounter.              Passed - Medication is active on med list        Passed - Patient is age 18 or older             Hellen Mendoza RN 02/06/23 12:53 PM  "

## 2023-02-07 RX ORDER — TADALAFIL 5 MG/1
5 TABLET ORAL DAILY
Qty: 90 TABLET | Refills: 2 | Status: SHIPPED | OUTPATIENT
Start: 2023-02-07 | End: 2023-11-13

## 2023-02-07 NOTE — TELEPHONE ENCOUNTER
This is a pharmacy request. They called and requested the medication on behalf of the patient because they do not have refills on file.

## 2023-08-29 ENCOUNTER — APPOINTMENT (OUTPATIENT)
Dept: RADIOLOGY | Facility: HOSPITAL | Age: 53
End: 2023-08-29
Attending: EMERGENCY MEDICINE
Payer: COMMERCIAL

## 2023-08-29 ENCOUNTER — HOSPITAL ENCOUNTER (EMERGENCY)
Facility: HOSPITAL | Age: 53
Discharge: HOME | End: 2023-08-29
Attending: EMERGENCY MEDICINE | Admitting: EMERGENCY MEDICINE
Payer: COMMERCIAL

## 2023-08-29 VITALS
RESPIRATION RATE: 16 BRPM | HEART RATE: 74 BPM | WEIGHT: 230 LBS | OXYGEN SATURATION: 95 % | DIASTOLIC BLOOD PRESSURE: 84 MMHG | BODY MASS INDEX: 36.96 KG/M2 | HEIGHT: 66 IN | SYSTOLIC BLOOD PRESSURE: 128 MMHG | TEMPERATURE: 98.3 F

## 2023-08-29 DIAGNOSIS — R07.9 CHEST PAIN, UNSPECIFIED TYPE: ICD-10-CM

## 2023-08-29 PROBLEM — N52.01 ERECTILE DYSFUNCTION DUE TO ARTERIAL INSUFFICIENCY: Status: ACTIVE | Noted: 2019-02-24

## 2023-08-29 LAB
ANION GAP SERPL CALCULATED.3IONS-SCNC: 11 MMOL/L (ref 7–15)
BASOPHILS # BLD AUTO: 0 10E3/UL (ref 0–0.2)
BASOPHILS NFR BLD AUTO: 0 %
BUN SERPL-MCNC: 8.8 MG/DL (ref 6–20)
CALCIUM SERPL-MCNC: 9.6 MG/DL (ref 8.6–10)
CHLORIDE SERPL-SCNC: 105 MMOL/L (ref 98–107)
CREAT SERPL-MCNC: 0.78 MG/DL (ref 0.67–1.17)
DEPRECATED HCO3 PLAS-SCNC: 25 MMOL/L (ref 22–29)
EOSINOPHIL # BLD AUTO: 0.2 10E3/UL (ref 0–0.7)
EOSINOPHIL NFR BLD AUTO: 3 %
ERYTHROCYTE [DISTWIDTH] IN BLOOD BY AUTOMATED COUNT: 12.1 % (ref 10–15)
GFR SERPL CREATININE-BSD FRML MDRD: >90 ML/MIN/1.73M2
GLUCOSE SERPL-MCNC: 94 MG/DL (ref 70–99)
HCT VFR BLD AUTO: 41.6 % (ref 40–53)
HGB BLD-MCNC: 14.2 G/DL (ref 13.3–17.7)
IMM GRANULOCYTES # BLD: 0 10E3/UL
IMM GRANULOCYTES NFR BLD: 1 %
LYMPHOCYTES # BLD AUTO: 2.6 10E3/UL (ref 0.8–5.3)
LYMPHOCYTES NFR BLD AUTO: 44 %
MCH RBC QN AUTO: 29.9 PG (ref 26.5–33)
MCHC RBC AUTO-ENTMCNC: 34.1 G/DL (ref 31.5–36.5)
MCV RBC AUTO: 88 FL (ref 78–100)
MONOCYTES # BLD AUTO: 0.4 10E3/UL (ref 0–1.3)
MONOCYTES NFR BLD AUTO: 7 %
NEUTROPHILS # BLD AUTO: 2.8 10E3/UL (ref 1.6–8.3)
NEUTROPHILS NFR BLD AUTO: 45 %
NRBC # BLD AUTO: 0 10E3/UL
NRBC BLD AUTO-RTO: 0 /100
PLATELET # BLD AUTO: 263 10E3/UL (ref 150–450)
POTASSIUM SERPL-SCNC: 3.8 MMOL/L (ref 3.4–5.3)
RBC # BLD AUTO: 4.75 10E6/UL (ref 4.4–5.9)
SODIUM SERPL-SCNC: 141 MMOL/L (ref 136–145)
TROPONIN T SERPL HS-MCNC: 6 NG/L
TROPONIN T SERPL HS-MCNC: 7 NG/L
WBC # BLD AUTO: 6 10E3/UL (ref 4–11)

## 2023-08-29 PROCEDURE — 93005 ELECTROCARDIOGRAM TRACING: CPT | Performed by: EMERGENCY MEDICINE

## 2023-08-29 PROCEDURE — 71046 X-RAY EXAM CHEST 2 VIEWS: CPT

## 2023-08-29 PROCEDURE — 80048 BASIC METABOLIC PNL TOTAL CA: CPT | Performed by: EMERGENCY MEDICINE

## 2023-08-29 PROCEDURE — 36415 COLL VENOUS BLD VENIPUNCTURE: CPT | Performed by: EMERGENCY MEDICINE

## 2023-08-29 PROCEDURE — 250N000013 HC RX MED GY IP 250 OP 250 PS 637: Performed by: EMERGENCY MEDICINE

## 2023-08-29 PROCEDURE — 84484 ASSAY OF TROPONIN QUANT: CPT | Performed by: EMERGENCY MEDICINE

## 2023-08-29 PROCEDURE — 99285 EMERGENCY DEPT VISIT HI MDM: CPT | Mod: 25

## 2023-08-29 PROCEDURE — 85025 COMPLETE CBC W/AUTO DIFF WBC: CPT | Performed by: EMERGENCY MEDICINE

## 2023-08-29 RX ORDER — ASPIRIN 81 MG/1
324 TABLET, CHEWABLE ORAL ONCE
Status: COMPLETED | OUTPATIENT
Start: 2023-08-29 | End: 2023-08-29

## 2023-08-29 RX ADMIN — ASPIRIN 81 MG CHEWABLE TABLET 324 MG: 81 TABLET CHEWABLE at 07:21

## 2023-08-29 ASSESSMENT — ACTIVITIES OF DAILY LIVING (ADL)
ADLS_ACUITY_SCORE: 33
ADLS_ACUITY_SCORE: 35

## 2023-08-29 NOTE — ED PROVIDER NOTES
EMERGENCY DEPARTMENT ENCOUNTER      NAME: Raheem Jeong  AGE: 53 year old male  YOB: 1970  MRN: [id number]  EVALUATION DATE & TIME: 8/29/2023  6:59 AM    PCP: Giovanna Clark    ED PROVIDER: Palak Guerrero MD    Chief Complaint   Patient presents with    Dizziness    Chest Pain    Shoulder Pain         FINAL IMPRESSION:  1. Chest pain, unspecified type          ED COURSE & MEDICAL DECISION MAKING:    Pertinent Labs & Imaging studies reviewed. (See chart for details)  53 year old male with history of obesity, ANTHONY who presents to the Emergency Department for evaluation of dizziness and chest/shoulder pain.  Patient describes dizziness as lightheadedness, this was associated with pain in his left shoulder.  The pain is somewhat reproducible with movement of the shoulder on examination.  The shoulder pain may be musculoskeletal.  However he has a brother with history of MI in his mid 50s, and given the associated dizziness certainly concern for ACS, atypical chest pain.  No associated shortness of breath, pleuritic component nor DVT/PE risk factors to warrant work-up for pulmonary embolism.  Patient describes radiation to the left anterior shoulder, no radiation to the back.  Patient is not significantly hypertensive and I do not think warrants work-up for aortic dissection.    Patient placed on monitor, IV established and blood obtained.  Twelve-lead EKG shows sinus rhythm.  No ischemic changes.  Patient was given aspirin.  CBC, BMP, troponin unremarkable.  Chest x-ray unremarkable.  Hospital admission considered, but with reassuring initial cardiac enzymes plan for delta troponin here.  Patient agreeable with plan.  Repeat troponin essentially unchanged.  Safe for discharge to home with outpatient provocative testing.  Warning signs return to ED discussed.       ED Course as of 08/29/23 1018   Tue Aug 29, 2023   0710 I met with the patient, obtained history, performed an initial exam, and  discussed options and plan for diagnostics and treatment here in the ED.    0816 Troponin T, High Sensitivity: 6   0829 I rechecked and updated the patient    0847 XR Chest 2 Views  Chest x-ray independently interpreted by myself.  No cardiomegaly infiltrate or effusion       Medical Decision Making    History:  Supplemental history from: Documented in chart, if applicable  External Record(s) reviewed: Outpatient Record: PCP visit 11/3/2022    Work Up:  Chart documentation includes differential considered and any EKGs or imaging independently interpreted by provider, see MDM  In additional to work up documented, I considered the following work up: see MDM    External consultation:  Discussion of management with another provider: N/A    Complicating factors:  Care impacted by chronic illness: Other: ANTHONY  Care affected by social determinants of health: Access to Medical Care night shift no access to PCP    Disposition considerations: Discharge. No recommendations on prescription strength medication(s). See documentation for any additional details.        At the conclusion of the encounter I discussed the results of all of the tests and the disposition. The questions were answered. The patient or family acknowledged understanding and was agreeable with the care plan.      MEDICATIONS GIVEN IN THE EMERGENCY:  Medications   aspirin (ASA) chewable tablet 324 mg (324 mg Oral $Given 8/29/23 0721)       NEW PRESCRIPTIONS STARTED AT TODAY'S ER VISIT  New Prescriptions    No medications on file          =================================================================    HPI    Patient information was obtained from: Patient    Use of Intrepreter: N/A         Raheem Jeong is a 53 year old male with a pertinent history of morbid obesity, ETOH abuse, and ANTHONY, who presents to this ED via walk-in for evaluation of lightheadedness, chest pain, and shoulder pain.     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 He reports that the shoulder pain started milder on Sunday, and he had set up an appointment with a chiropractor. He denies it is worse with movement, it is more of an achy pain. Patient reports that the pain had improved on the drive to the ED, and only has mild lightheadedness now with 2/10 shoulder pain. He describes the chest pain as a dull pain. Patient denies skipped heart beats or fast rhythm. No black or bloody stool. He denies a history of heart disease. Patient reports his brother had a heart attack 4-5 years ago around age 55. He has not taken Advil for the pain. Patient denies any other current complaints.      PAST MEDICAL HISTORY:  Past Medical History:   Diagnosis Date    Anxiety     CARDIOVASCULAR SCREENING; LDL GOAL LESS THAN 160 11/4/2011    History of ETOH abuse     ANTHONY (obstructive sleep apnea)     States he lost weight and no longer has    PONV (postoperative nausea and vomiting)     Sleep apnea        PAST SURGICAL HISTORY:  Past Surgical History:   Procedure Laterality Date    AS MUSC/TENDON REPAIR EACH; ARM/ELBOW Left     tendon injury - L arm    EYE SURGERY      lasik    HERNIA REPAIR Right 01/01/1988    OTHER SURGICAL HISTORY Left     knuckle surgerymiddle digit on left hand - due to OA    ZZHC COLONOSCOPY W/WO BRUSH/WASH N/A 05/25/2021    Procedure: COLONOSCOPY;  Surgeon: Octaviano Ambriz MD;  Location: [facility];  Service: General       CURRENT MEDICATIONS:    Prior to Admission Medications   Prescriptions Last Dose Informant Patient Reported? Taking?   cholecalciferol 25 MCG (1000 UT) TABS   Yes No   Sig: Take 1,000 Units by mouth daily   fexofenadine (ALLEGRA) 180 MG tablet   No No   Sig: Take 1 tablet (180 mg) by mouth daily   fluticasone (FLONASE) 50 MCG/ACT nasal spray   No No   Sig: Spray 1 spray into both nostrils daily   multivitamin w/minerals (THERA-VIT-M) tablet   Yes No   Sig: Take 1  "tablet by mouth daily   naltrexone (DEPADE/REVIA) 50 MG tablet   No No   Sig: Take 1 tablet (50 mg) by mouth daily   scopolamine (TRANSDERM) 1 MG/3DAYS 72 hr patch   No No   Sig: Place 1 patch onto the skin every 72 hours   tadalafil (CIALIS) 5 MG tablet   No No   Sig: Take 1 tablet (5 mg) by mouth daily      Facility-Administered Medications: None       ALLERGIES:  No Known Allergies    FAMILY HISTORY:  Family History   Problem Relation Age of Onset    Pacemaker Mother     Hypertension Mother     Diabetes Father     Mental Illness Sister         overdose    Cancer Brother         prostate    Diabetes Brother     Pacemaker Maternal Grandmother     Arthritis Maternal Grandfather         hip issues    No Known Problems Paternal Grandmother     No Known Problems Paternal Grandfather     Colon Cancer Maternal Aunt     Colon Cancer Maternal Uncle        SOCIAL HISTORY:  Social History     Tobacco Use    Smoking status: Former     Types: Cigarettes     Quit date: 1998     Years since quittin.6    Smokeless tobacco: Never   Substance Use Topics    Alcohol use: No     Comment: quit 8 years ()    Drug use: No        VITALS:  Patient Vitals for the past 24 hrs:   BP Temp Temp src Pulse Resp SpO2 Height Weight   23 0830 128/84 -- -- 74 16 95 % -- --   23 0800 -- -- -- 70 18 94 % -- --   23 0745 119/76 -- -- 73 30 94 % -- --   23 0654 129/87 98.3  F (36.8  C) Oral 73 16 97 % 1.676 m (5' 6\") 104.3 kg (230 lb)       PHYSICAL EXAM    General Appearance: Well-appearing, well-nourished, no acute distress   Head:  Normocephalic  Chest:  No tenderness or deformity, no crepitus  Cardio:  Regular rate and rhythm, no murmur/gallop/rub, 2+ pulses symmetric in all extremities  Pulm:  No respiratory distress, clear to auscultation bilaterally  Abdomen:  Soft, non-tender, non distended,no rebound or guarding.  Extremities: Slightly reproducible pain with range of motion of the left shoulder.  No erythema " swelling or warmth  Skin:  Skin warm, dry, no rashes  Neuro:  Alert and oriented ×3, moving all extremities, no gross sensory defects     RADIOLOGY/LABS:  Reviewed all pertinent imaging. Please see official radiology report. All pertinent labs reviewed and interpreted.    Results for orders placed or performed during the hospital encounter of 08/29/23   XR Chest 2 Views    Impression    IMPRESSION: A thin strand of fibrosis right midlung is unchanged. Lungs otherwise clear. No pleural effusion. Heart size and pulmonary vascularity normal.   Basic metabolic panel   Result Value Ref Range    Sodium 141 136 - 145 mmol/L    Potassium 3.8 3.4 - 5.3 mmol/L    Chloride 105 98 - 107 mmol/L    Carbon Dioxide (CO2) 25 22 - 29 mmol/L    Anion Gap 11 7 - 15 mmol/L    Urea Nitrogen 8.8 6.0 - 20.0 mg/dL    Creatinine 0.78 0.67 - 1.17 mg/dL    Calcium 9.6 8.6 - 10.0 mg/dL    Glucose 94 70 - 99 mg/dL    GFR Estimate >90 >60 mL/min/1.73m2   Result Value Ref Range    Troponin T, High Sensitivity 6 <=22 ng/L   CBC with platelets and differential   Result Value Ref Range    WBC Count 6.0 4.0 - 11.0 10e3/uL    RBC Count 4.75 4.40 - 5.90 10e6/uL    Hemoglobin 14.2 13.3 - 17.7 g/dL    Hematocrit 41.6 40.0 - 53.0 %    MCV 88 78 - 100 fL    MCH 29.9 26.5 - 33.0 pg    MCHC 34.1 31.5 - 36.5 g/dL    RDW 12.1 10.0 - 15.0 %    Platelet Count 263 150 - 450 10e3/uL    % Neutrophils 45 %    % Lymphocytes 44 %    % Monocytes 7 %    % Eosinophils 3 %    % Basophils 0 %    % Immature Granulocytes 1 %    NRBCs per 100 WBC 0 <1 /100    Absolute Neutrophils 2.8 1.6 - 8.3 10e3/uL    Absolute Lymphocytes 2.6 0.8 - 5.3 10e3/uL    Absolute Monocytes 0.4 0.0 - 1.3 10e3/uL    Absolute Eosinophils 0.2 0.0 - 0.7 10e3/uL    Absolute Basophils 0.0 0.0 - 0.2 10e3/uL    Absolute Immature Granulocytes 0.0 <=0.4 10e3/uL    Absolute NRBCs 0.0 10e3/uL   Troponin T, High Sensitivity (now)   Result Value Ref Range    Troponin T, High Sensitivity 7 <=22 ng/L        EKG:  Performed at: 07:08     Impression: Sinus rhythm     Rate: 75 bpm  Rhythm: sinus  Axis: 27  TX Interval: 166 ms  QRS Interval: 94 ms  QTc Interval: 419 ms  ST Changes: N/A  Comparison: When compared with ECG of 05-SEP-2019 08:36, no significant change was found.     I have independently reviewed and interpreted the EKG(s) documented above.      The creation of this record is based on the scribe s observations of the work being performed by Palak Guerrero MD and the provider s statements to them. It was created on her behalf by Viky Luna, a trained medical scribe. This document has been checked and approved by the attending provider.    Palak Guerrero MD  Emergency Medicine  [facility] EMERGENCY DEPARTMENT  [address] [place] [id number] [phone]  Dept: [phone]     Palak Guerrero MD  08/29/23 1014

## 2023-08-29 NOTE — ED TRIAGE NOTES
Pt reports onset of some chest pain and left shoulder pain and tingling at 0300 am. Pt also reports that he had some dizziness between 3 to 5 am. Pt later added that his left shoulder started hurting on Sunday night and he made an appointment to go in and have it checked out. Pt has not been seen for that reason yet. Denies personal hx of heart disease.

## 2023-09-01 LAB
ATRIAL RATE - MUSE: 75 BPM
DIASTOLIC BLOOD PRESSURE - MUSE: NORMAL MMHG
INTERPRETATION ECG - MUSE: NORMAL
P AXIS - MUSE: 25 DEGREES
PR INTERVAL - MUSE: 166 MS
QRS DURATION - MUSE: 94 MS
QT - MUSE: 376 MS
QTC - MUSE: 419 MS
R AXIS - MUSE: 27 DEGREES
SYSTOLIC BLOOD PRESSURE - MUSE: NORMAL MMHG
T AXIS - MUSE: 17 DEGREES
VENTRICULAR RATE- MUSE: 75 BPM

## 2023-09-13 ENCOUNTER — OFFICE VISIT (OUTPATIENT)
Dept: CARDIOLOGY | Facility: CLINIC | Age: 53
End: 2023-09-13
Attending: EMERGENCY MEDICINE
Payer: COMMERCIAL

## 2023-09-13 ENCOUNTER — TELEPHONE (OUTPATIENT)
Dept: CARDIOLOGY | Facility: CLINIC | Age: 53
End: 2023-09-13

## 2023-09-13 VITALS
HEIGHT: 66 IN | HEART RATE: 90 BPM | DIASTOLIC BLOOD PRESSURE: 78 MMHG | SYSTOLIC BLOOD PRESSURE: 118 MMHG | WEIGHT: 234 LBS | RESPIRATION RATE: 16 BRPM | BODY MASS INDEX: 37.61 KG/M2

## 2023-09-13 DIAGNOSIS — R07.9 CHEST PAIN, UNSPECIFIED TYPE: Primary | ICD-10-CM

## 2023-09-13 DIAGNOSIS — E66.09 CLASS 2 OBESITY DUE TO EXCESS CALORIES WITHOUT SERIOUS COMORBIDITY WITH BODY MASS INDEX (BMI) OF 37.0 TO 37.9 IN ADULT: ICD-10-CM

## 2023-09-13 DIAGNOSIS — E66.812 CLASS 2 OBESITY DUE TO EXCESS CALORIES WITHOUT SERIOUS COMORBIDITY WITH BODY MASS INDEX (BMI) OF 37.0 TO 37.9 IN ADULT: ICD-10-CM

## 2023-09-13 DIAGNOSIS — G47.33 OSA (OBSTRUCTIVE SLEEP APNEA): ICD-10-CM

## 2023-09-13 PROCEDURE — 99204 OFFICE O/P NEW MOD 45 MIN: CPT | Performed by: INTERNAL MEDICINE

## 2023-09-13 NOTE — PROGRESS NOTES
[facility] Heart Care Office Consult     Assessment:   (R07.9) Chest pain, unspecified type  (primary encounter diagnosis)  Comment: Very atypical, one episode while he was having lightheadedness, enzymes negative and ECG comforting.  In addition coronary stress echo as well as coronary CTA were normal in 2017/2018.  Does have family history with his brother having had stenting in his mid to late 50s.  Given this would recommend repeat stress echo, if significantly abnormal would then consider angiography.    (G47.33) ANTHONY (obstructive sleep apnea)  Comment: Mild, does not use CPAP, would recommend weight loss.    (E66.09,  Z68.37) Class 2 obesity due to excess calories without serious comorbidity with body mass index (BMI) of 37.0 to 37.9 in adult  Comment: As above weight loss.    Dizziness-uncertain etiology, if recurs frequently might need to rule out arrhythmia, could be low sugar.     Plan:   1.  Work on weight loss.  2.  Arrange stress echo, if significantly abnormal will need angiography.  3.  Follow-up as needed.    History of Present Illness:   Thank you for asking the [facility] Heart Care team to see Raheem Jeong a 53 year old  male  in consultation  to evaluate chest discomfort and lightheadedness.   Patient tells me he has had light NS on 2 prior occasions about 8 months apart where he felt dizzy.  The day of presentation he was at work, had lightheadedness with associated left arm discomfort.  Presented to the emergency department where he had a normal ECG and normal enzymes and symptoms resolved after 4 hours.  He is referred to the rapid access clinic.  He is not physically active but denies any significant chest discomfort or effort related chest discomfort or effort related shortness of breath.    Past Medical History:     Past Medical History:   Diagnosis Date    Anxiety     CARDIOVASCULAR SCREENING; LDL GOAL LESS THAN 160 11/4/2011    History of ETOH abuse     PONV (postoperative nausea  and vomiting)     Sleep apnea      Past history is negative for cancer, tuberculosis, diabetes mellitus, myocardial infarction,  rheumatic fever, hypertension, cerebrovascular accident, chronic kidney disease, peptic ulcer disease, chronic obstructive pulmonary disease, or thyroid disorder  and no lipid disorder.     Past Surgical History:     Past Surgical History:   Procedure Laterality Date    AS MUSC/TENDON REPAIR EACH; ARM/ELBOW Left     tendon injury - L arm    EYE SURGERY      lasik    HERNIA REPAIR Right 01/01/1988    OTHER SURGICAL HISTORY Left     knuckle surgerymiddle digit on left hand - due to OA    ZZHC COLONOSCOPY W/WO BRUSH/WASH N/A 05/25/2021    Procedure: COLONOSCOPY;  Surgeon: Octaviano Ambriz MD;  Location: [facility];  Service: General     Family History:   Family history positive MI with stenting in his brother at the age of 57.    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 His smoking use included cigarettes, 1 to 2 packs a day for 10 years. He has never used smokeless tobacco. He reports that he does not drink alcohol does consider him an alcoholic having drank last 8 years ago and does not use drugs. The primary care physician is Giovanna Clark    Meds:   Scheduled Meds:  Current Outpatient Medications   Medication Sig Dispense Refill    cholecalciferol 25 MCG (1000 UT) TABS Take 1,000 Units by mouth daily      fexofenadine (ALLEGRA) 180 MG tablet Take 1 tablet (180 mg) by mouth daily 90 tablet 3    fluticasone (FLONASE) 50 MCG/ACT nasal spray Spray 1 spray into both nostrils daily 16 g 3    multivitamin w/minerals (THERA-VIT-M) tablet Take 1 tablet by mouth daily      naltrexone (DEPADE/REVIA) 50 MG tablet Take 1 tablet (50 mg) by mouth daily 90 tablet 3    tadalafil (CIALIS) 5 MG tablet Take 1 tablet (5 mg) by mouth daily 90 tablet 2    scopolamine  "(TRANSDERM) 1 MG/3DAYS 72 hr patch Place 1 patch onto the skin every 72 hours (Patient not taking: Reported on 9/13/2023) 4 patch 0       PRN Meds:.    Allergies:   Patient has no known allergies.    Objective:      Physical Exam  106.1 kg (234 lb)  1.676 m (5' 6\")  Body mass index is 37.77 kg/m .  /78 (BP Location: Left arm, Patient Position: Sitting, Cuff Size: Adult Large)   Pulse 90   Resp 16   Ht 1.676 m (5' 6\")   Wt 106.1 kg (234 lb)   BMI 37.77 kg/m      General Appearance:   Alert, cooperative and in no acute distress.   HEENT:  No scleral icterus; the mucous membranes were pink and moist.   Neck: JVP normal. No thyromegaly. No HJR   Chest: The spine was straight. The chest was symmetric.   Lungs:   Respirations unlabored; the lungs are clear to auscultation.   Cardiovascular:   S1 and S2 without murmur, clicks or rubs. Brachial, radial, carotid and posterior tibial pulses are intact and symetrical.  No carotid bruits noted   Abdomen:  No organomegaly, masses, bruits, or tenderness. Bowels sounds are present   Extremities: No cyanosis, clubbing, or edema.   Skin: No xanthelasma.   Neurologic: Mood and affect are appropriate.         Lab Reviewed Personally by myself  Lab Results   Component Value Date     08/29/2023     11/04/2011    CO2 25 08/29/2023    CO2 26 05/05/2022    CO2 22 11/04/2011    BUN 8.8 08/29/2023    BUN 6 05/05/2022    BUN 12 11/04/2011     Lab Results   Component Value Date    WBC 6.0 08/29/2023    HGB 14.2 08/29/2023    HCT 41.6 08/29/2023    MCV 88 08/29/2023     08/29/2023     Lab Results   Component Value Date    CHOL 162 11/03/2022    CHOL 179 11/04/2011    TRIG 167 11/03/2022    TRIG 79 11/04/2011    HDL 40 11/03/2022    HDL 58 11/04/2011     No results found for: BNP    ECG personally reviewed by myself shows normal sinus rhythm, within normal limits.         Review of Systems:     Review of Systems:   Enc Vitals  BP: 118/78  Pulse: 90  Resp: " "16  Weight: 106.1 kg (234 lb)  Height: 167.6 cm (5' 6\")                                          "

## 2023-09-13 NOTE — PATIENT INSTRUCTIONS
Raheem TRISH Jeong,  It certainly was nice to meet you today.  Per our conversation you're having some dizzy and arm pains.  This could represent heart issues but all looked good in the ED as well as testing years ago.  But let us repeat stress test given your brother.  We will call you the results.  Clay Ibrahim

## 2023-09-13 NOTE — LETTER
9/13/2023    Giovanna Clark MD  [address]  [facility] [address]    RE: Raheem Jeong       Dear Colleague,     I had the pleasure of seeing Raheem Jeong in the [facility].    [facility] Heart Care Office Consult     Assessment:   (R07.9) Chest pain, unspecified type  (primary encounter diagnosis)  Comment: Very atypical, one episode while he was having lightheadedness, enzymes negative and ECG comforting.  In addition coronary stress echo as well as coronary CTA were normal in 2017/2018.  Does have family history with his brother having had stenting in his mid to late 50s.  Given this would recommend repeat stress echo, if significantly abnormal would then consider angiography.    (G47.33) ANTHONY (obstructive sleep apnea)  Comment: Mild, does not use CPAP, would recommend weight loss.    (E66.09,  Z68.37) Class 2 obesity due to excess calories without serious comorbidity with body mass index (BMI) of 37.0 to 37.9 in adult  Comment: As above weight loss.    Dizziness-uncertain etiology, if recurs frequently might need to rule out arrhythmia, could be low sugar.     Plan:   1.  Work on weight loss.  2.  Arrange stress echo, if significantly abnormal will need angiography.  3.  Follow-up as needed.    History of Present Illness:   Thank you for asking the [facility] Heart Care team to see Raheem Jeong a 53 year old  male  in consultation  to evaluate chest discomfort and lightheadedness.   Patient tells me he has had light NS on 2 prior occasions about 8 months apart where he felt dizzy.  The day of presentation he was at work, had lightheadedness with associated left arm discomfort.  Presented to the emergency department where he had a normal ECG and normal enzymes and symptoms resolved after 4 hours.  He is referred to the rapid access clinic.  He is not physically active but denies any significant chest discomfort or effort related chest discomfort or effort related  shortness of breath.    Past Medical History:     Past Medical History:   Diagnosis Date    Anxiety     CARDIOVASCULAR SCREENING; LDL GOAL LESS THAN 160 11/4/2011    History of ETOH abuse     PONV (postoperative nausea and vomiting)     Sleep apnea      Past history is negative for cancer, tuberculosis, diabetes mellitus, myocardial infarction,  rheumatic fever, hypertension, cerebrovascular accident, chronic kidney disease, peptic ulcer disease, chronic obstructive pulmonary disease, or thyroid disorder  and no lipid disorder.     Past Surgical History:     Past Surgical History:   Procedure Laterality Date    AS MUSC/TENDON REPAIR EACH; ARM/ELBOW Left     tendon injury - L arm    EYE SURGERY      lasik    HERNIA REPAIR Right 01/01/1988    OTHER SURGICAL HISTORY Left     knuckle surgerymiddle digit on left hand - due to OA    ZZHC COLONOSCOPY W/WO BRUSH/WASH N/A 05/25/2021    Procedure: COLONOSCOPY;  Surgeon: Octaviano Ambriz MD;  Location: [facility];  Service: General     Family History:   Family history positive MI with stenting in his brother at the age of 57.    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 His smoking use included cigarettes, 1 to 2 packs a day for 10 years. He has never used smokeless tobacco. He reports that he does not drink alcohol does consider him an alcoholic having drank last 8 years ago and does not use drugs. The primary care physician is Giovanna Clark    Meds:   Scheduled Meds:  Current Outpatient Medications   Medication Sig Dispense Refill    cholecalciferol 25 MCG (1000 UT) TABS Take 1,000 Units by mouth daily      fexofenadine (ALLEGRA) 180 MG tablet Take 1 tablet (180 mg) by mouth daily 90 tablet 3    fluticasone (FLONASE) 50 MCG/ACT nasal spray Spray 1 spray into both nostrils daily 16 g 3    multivitamin w/minerals (THERA-VIT-M) tablet  "Take 1 tablet by mouth daily      naltrexone (DEPADE/REVIA) 50 MG tablet Take 1 tablet (50 mg) by mouth daily 90 tablet 3    tadalafil (CIALIS) 5 MG tablet Take 1 tablet (5 mg) by mouth daily 90 tablet 2    scopolamine (TRANSDERM) 1 MG/3DAYS 72 hr patch Place 1 patch onto the skin every 72 hours (Patient not taking: Reported on 9/13/2023) 4 patch 0       PRN Meds:.    Allergies:   Patient has no known allergies.    Objective:      Physical Exam  106.1 kg (234 lb)  1.676 m (5' 6\")  Body mass index is 37.77 kg/m .  /78 (BP Location: Left arm, Patient Position: Sitting, Cuff Size: Adult Large)   Pulse 90   Resp 16   Ht 1.676 m (5' 6\")   Wt 106.1 kg (234 lb)   BMI 37.77 kg/m      General Appearance:   Alert, cooperative and in no acute distress.   HEENT:  No scleral icterus; the mucous membranes were pink and moist.   Neck: JVP normal. No thyromegaly. No HJR   Chest: The spine was straight. The chest was symmetric.   Lungs:   Respirations unlabored; the lungs are clear to auscultation.   Cardiovascular:   S1 and S2 without murmur, clicks or rubs. Brachial, radial, carotid and posterior tibial pulses are intact and symetrical.  No carotid bruits noted   Abdomen:  No organomegaly, masses, bruits, or tenderness. Bowels sounds are present   Extremities: No cyanosis, clubbing, or edema.   Skin: No xanthelasma.   Neurologic: Mood and affect are appropriate.         Lab Reviewed Personally by myself  Lab Results   Component Value Date     08/29/2023     11/04/2011    CO2 25 08/29/2023    CO2 26 05/05/2022    CO2 22 11/04/2011    BUN 8.8 08/29/2023    BUN 6 05/05/2022    BUN 12 11/04/2011     Lab Results   Component Value Date    WBC 6.0 08/29/2023    HGB 14.2 08/29/2023    HCT 41.6 08/29/2023    MCV 88 08/29/2023     08/29/2023     Lab Results   Component Value Date    CHOL 162 11/03/2022    CHOL 179 11/04/2011    TRIG 167 11/03/2022    TRIG 79 11/04/2011    HDL 40 11/03/2022    HDL 58 11/04/2011 " "    No results found for: BNP    ECG personally reviewed by myself shows normal sinus rhythm, within normal limits.         Review of Systems:     Review of Systems:   Enc Vitals  BP: 118/78  Pulse: 90  Resp: 16  Weight: 106.1 kg (234 lb)  Height: 167.6 cm (5' 6\")                      Thank you for allowing me to participate in the care of your patient.      Sincerely,     RUBEN SMITH MD     [facility]  cc:   Palak Guerrero MD  [address] [place] [address]      "

## 2023-09-13 NOTE — TELEPHONE ENCOUNTER
[facility] Call Center    Phone Message    May a detailed message be left on voicemail: yes     Reason for Call: Other: Patient is looking to reschedule their echo stress test appt on 10/04 to an earlier time in [place]. If possible, wanting something right in the morning and beginning of Oct. Please call patient back to further coordinate.     Action Taken: Other: Cardiology    Travel Screening: Not Applicable    Thank you!  [facility]

## 2023-10-06 ENCOUNTER — HOSPITAL ENCOUNTER (OUTPATIENT)
Dept: CARDIOLOGY | Facility: HOSPITAL | Age: 53
Discharge: HOME | End: 2023-10-06
Attending: INTERNAL MEDICINE | Admitting: INTERNAL MEDICINE
Payer: COMMERCIAL

## 2023-10-06 DIAGNOSIS — R07.9 CHEST PAIN, UNSPECIFIED TYPE: ICD-10-CM

## 2023-10-06 PROCEDURE — 93016 CV STRESS TEST SUPVJ ONLY: CPT | Performed by: INTERNAL MEDICINE

## 2023-10-06 PROCEDURE — C8928 TTE W OR W/O FOL W/CON,STRES: HCPCS

## 2023-10-06 PROCEDURE — 93321 DOPPLER ECHO F-UP/LMTD STD: CPT | Mod: 26 | Performed by: INTERNAL MEDICINE

## 2023-10-06 PROCEDURE — 93018 CV STRESS TEST I&R ONLY: CPT | Performed by: INTERNAL MEDICINE

## 2023-10-06 PROCEDURE — 93352 ADMIN ECG CONTRAST AGENT: CPT | Performed by: INTERNAL MEDICINE

## 2023-10-06 PROCEDURE — 93325 DOPPLER ECHO COLOR FLOW MAPG: CPT | Mod: 26 | Performed by: INTERNAL MEDICINE

## 2023-10-06 PROCEDURE — 93321 DOPPLER ECHO F-UP/LMTD STD: CPT | Mod: TC

## 2023-10-06 PROCEDURE — 93350 STRESS TTE ONLY: CPT | Mod: 26 | Performed by: INTERNAL MEDICINE

## 2023-10-06 PROCEDURE — 999N000208 ECHO STRESS ECHOCARDIOGRAM

## 2023-10-06 PROCEDURE — 255N000002 HC RX 255 OP 636: Performed by: INTERNAL MEDICINE

## 2023-10-06 RX ADMIN — PERFLUTREN 6 ML: 6.52 INJECTION, SUSPENSION INTRAVENOUS at 08:30

## 2023-10-26 ENCOUNTER — PATIENT OUTREACH (OUTPATIENT)
Dept: GASTROENTEROLOGY | Facility: CLINIC | Age: 53
End: 2023-10-26
Payer: COMMERCIAL

## 2023-11-12 ENCOUNTER — MYC REFILL (OUTPATIENT)
Dept: FAMILY MEDICINE | Facility: CLINIC | Age: 53
End: 2023-11-12
Payer: COMMERCIAL

## 2023-11-12 DIAGNOSIS — J31.0 CHRONIC RHINITIS: ICD-10-CM

## 2023-11-13 DIAGNOSIS — T75.3XXD MOTION SICKNESS, SUBSEQUENT ENCOUNTER: ICD-10-CM

## 2023-11-13 DIAGNOSIS — N52.9 PRIMARY ERECTILE DYSFUNCTION: ICD-10-CM

## 2023-11-13 RX ORDER — TADALAFIL 5 MG/1
5 TABLET ORAL DAILY
Qty: 90 TABLET | Refills: 0 | Status: SHIPPED | OUTPATIENT
Start: 2023-11-13 | End: 2024-02-14

## 2023-11-13 RX ORDER — FEXOFENADINE HCL 180 MG/1
180 TABLET ORAL DAILY
Qty: 90 TABLET | Refills: 0 | Status: SHIPPED | OUTPATIENT
Start: 2023-11-13 | End: 2024-02-14

## 2023-11-13 RX ORDER — FLUTICASONE PROPIONATE 50 MCG
1 SPRAY, SUSPENSION (ML) NASAL DAILY
Qty: 16 G | Refills: 0 | Status: SHIPPED | OUTPATIENT
Start: 2023-11-13 | End: 2024-02-14

## 2023-11-14 RX ORDER — SCOLOPAMINE TRANSDERMAL SYSTEM 1 MG/1
PATCH, EXTENDED RELEASE TRANSDERMAL
Qty: 4 PATCH | Refills: 0 | Status: SHIPPED | OUTPATIENT
Start: 2023-11-14

## 2023-11-15 DIAGNOSIS — E66.01 MORBID OBESITY (H): ICD-10-CM

## 2023-11-15 NOTE — TELEPHONE ENCOUNTER
Pending Prescriptions:                       Disp   Refills    naltrexone (DEPADE/REVIA) 50 MG tablet    90 tab*3            Sig: Take 1 tablet (50 mg) by mouth daily

## 2023-11-16 RX ORDER — NALTREXONE HYDROCHLORIDE 50 MG/1
50 TABLET, FILM COATED ORAL DAILY
Qty: 90 TABLET | Refills: 1 | Status: SHIPPED | OUTPATIENT
Start: 2023-11-16 | End: 2024-02-14

## 2024-01-06 ENCOUNTER — HEALTH MAINTENANCE LETTER (OUTPATIENT)
Age: 54
End: 2024-01-06

## 2024-01-15 ENCOUNTER — OFFICE VISIT (OUTPATIENT)
Dept: FAMILY MEDICINE | Facility: CLINIC | Age: 54
End: 2024-01-15
Payer: COMMERCIAL

## 2024-01-15 VITALS
WEIGHT: 239.7 LBS | DIASTOLIC BLOOD PRESSURE: 88 MMHG | OXYGEN SATURATION: 98 % | HEART RATE: 79 BPM | TEMPERATURE: 97.9 F | HEIGHT: 68 IN | RESPIRATION RATE: 10 BRPM | SYSTOLIC BLOOD PRESSURE: 139 MMHG | BODY MASS INDEX: 36.33 KG/M2

## 2024-01-15 DIAGNOSIS — Z13.1 DIABETES MELLITUS SCREENING: ICD-10-CM

## 2024-01-15 DIAGNOSIS — Z13.220 LIPID SCREENING: ICD-10-CM

## 2024-01-15 DIAGNOSIS — G47.33 OSA (OBSTRUCTIVE SLEEP APNEA): ICD-10-CM

## 2024-01-15 DIAGNOSIS — Z00.00 ROUTINE GENERAL MEDICAL EXAMINATION AT A HEALTH CARE FACILITY: Primary | ICD-10-CM

## 2024-01-15 DIAGNOSIS — Z12.5 SCREENING FOR PROSTATE CANCER: ICD-10-CM

## 2024-01-15 DIAGNOSIS — Z86.0100 PERSONAL HISTORY OF COLONIC POLYPS: ICD-10-CM

## 2024-01-15 DIAGNOSIS — N52.01 ERECTILE DYSFUNCTION DUE TO ARTERIAL INSUFFICIENCY: ICD-10-CM

## 2024-01-15 DIAGNOSIS — E66.01 MORBID OBESITY (H): ICD-10-CM

## 2024-01-15 LAB
ALBUMIN SERPL BCG-MCNC: 4.5 G/DL (ref 3.5–5.2)
ALP SERPL-CCNC: 56 U/L (ref 40–150)
ALT SERPL W P-5'-P-CCNC: 20 U/L (ref 0–70)
ANION GAP SERPL CALCULATED.3IONS-SCNC: 10 MMOL/L (ref 7–15)
AST SERPL W P-5'-P-CCNC: 25 U/L (ref 0–45)
BILIRUB SERPL-MCNC: 0.4 MG/DL
BUN SERPL-MCNC: 8.7 MG/DL (ref 6–20)
CALCIUM SERPL-MCNC: 9.7 MG/DL (ref 8.6–10)
CHLORIDE SERPL-SCNC: 107 MMOL/L (ref 98–107)
CHOLEST SERPL-MCNC: 167 MG/DL
CREAT SERPL-MCNC: 0.74 MG/DL (ref 0.67–1.17)
DEPRECATED HCO3 PLAS-SCNC: 25 MMOL/L (ref 22–29)
EGFRCR SERPLBLD CKD-EPI 2021: >90 ML/MIN/1.73M2
ERYTHROCYTE [DISTWIDTH] IN BLOOD BY AUTOMATED COUNT: 12.2 % (ref 10–15)
FASTING STATUS PATIENT QL REPORTED: ABNORMAL
GLUCOSE SERPL-MCNC: 95 MG/DL (ref 70–99)
HBA1C MFR BLD: 5.4 % (ref 0–5.6)
HCT VFR BLD AUTO: 44.8 % (ref 40–53)
HDLC SERPL-MCNC: 42 MG/DL
HGB BLD-MCNC: 16 G/DL (ref 13.3–17.7)
LDLC SERPL CALC-MCNC: 110 MG/DL
MCH RBC QN AUTO: 31.1 PG (ref 26.5–33)
MCHC RBC AUTO-ENTMCNC: 35.7 G/DL (ref 31.5–36.5)
MCV RBC AUTO: 87 FL (ref 78–100)
NONHDLC SERPL-MCNC: 125 MG/DL
PLATELET # BLD AUTO: 247 10E3/UL (ref 150–450)
POTASSIUM SERPL-SCNC: 3.9 MMOL/L (ref 3.4–5.3)
PROT SERPL-MCNC: 7.9 G/DL (ref 6.4–8.3)
PSA SERPL DL<=0.01 NG/ML-MCNC: 0.7 NG/ML (ref 0–3.5)
RBC # BLD AUTO: 5.15 10E6/UL (ref 4.4–5.9)
SODIUM SERPL-SCNC: 142 MMOL/L (ref 135–145)
TRIGL SERPL-MCNC: 77 MG/DL
WBC # BLD AUTO: 5.7 10E3/UL (ref 4–11)

## 2024-01-15 PROCEDURE — 80053 COMPREHEN METABOLIC PANEL: CPT | Performed by: NURSE PRACTITIONER

## 2024-01-15 PROCEDURE — 90686 IIV4 VACC NO PRSV 0.5 ML IM: CPT | Performed by: NURSE PRACTITIONER

## 2024-01-15 PROCEDURE — 99396 PREV VISIT EST AGE 40-64: CPT | Mod: 25 | Performed by: NURSE PRACTITIONER

## 2024-01-15 PROCEDURE — 90472 IMMUNIZATION ADMIN EACH ADD: CPT | Performed by: NURSE PRACTITIONER

## 2024-01-15 PROCEDURE — G0103 PSA SCREENING: HCPCS | Performed by: NURSE PRACTITIONER

## 2024-01-15 PROCEDURE — 83036 HEMOGLOBIN GLYCOSYLATED A1C: CPT | Performed by: NURSE PRACTITIONER

## 2024-01-15 PROCEDURE — 80061 LIPID PANEL: CPT | Performed by: NURSE PRACTITIONER

## 2024-01-15 PROCEDURE — 90471 IMMUNIZATION ADMIN: CPT | Performed by: NURSE PRACTITIONER

## 2024-01-15 PROCEDURE — 36415 COLL VENOUS BLD VENIPUNCTURE: CPT | Performed by: NURSE PRACTITIONER

## 2024-01-15 PROCEDURE — 85027 COMPLETE CBC AUTOMATED: CPT | Performed by: NURSE PRACTITIONER

## 2024-01-15 PROCEDURE — 90715 TDAP VACCINE 7 YRS/> IM: CPT | Performed by: NURSE PRACTITIONER

## 2024-01-15 ASSESSMENT — ENCOUNTER SYMPTOMS
CONSTIPATION: 0
JOINT SWELLING: 0
HEMATOCHEZIA: 0
HEMATURIA: 0
FEVER: 0
FREQUENCY: 1
HEARTBURN: 0
ARTHRALGIAS: 0
SHORTNESS OF BREATH: 0
PARESTHESIAS: 0
NERVOUS/ANXIOUS: 0
NAUSEA: 0
DYSURIA: 0
HEADACHES: 0
MYALGIAS: 0
COUGH: 0
PALPITATIONS: 0
CHILLS: 0
DIZZINESS: 0
EYE PAIN: 0
SORE THROAT: 0
WEAKNESS: 0
ABDOMINAL PAIN: 0
DIARRHEA: 0

## 2024-01-15 ASSESSMENT — PAIN SCALES - GENERAL: PAINLEVEL: NO PAIN (0)

## 2024-01-15 NOTE — PROGRESS NOTES
SUBJECTIVE:   Raheem is a 53 year old, presenting for the following:  No chief complaint on file.        1/15/2024     7:49 AM   Additional Questions   Roomed by Denia STERN        Patient presents today for routine physical examination.  Medical history significant for obesity, obstructive sleep apnea, history of colonic polyps.  He reports feeling well today and does not have any foot concerns at this time.  He is going to be due for a follow-up colonoscopy in spring 2024.  His last colonoscopy was 3 years ago and he was recommended a 3-year follow-up screening due to colonic polyps.  He would like to have routine screening of cholesterol, screening for diabetes and prostate cancer screening.  He reports having weight gain due to poor diet, lack of physical exercise.  The patient works night shift and states that is difficult to find time to eat healthy.  His brother had prostate cancer in his 50s and patient would like to start screening for prostate cancer.  He otherwise feels generally well.  No recent illnesses or concerns.      Social History     Tobacco Use    Smoking status: Former     Types: Cigarettes     Quit date: 1998     Years since quittin.0    Smokeless tobacco: Never   Substance Use Topics    Alcohol use: No     Comment: quit 8 years ()             11/3/2022     7:58 AM   Alcohol Use   Prescreen: >3 drinks/day or >7 drinks/week? Not Applicable       Last PSA:   Prostate Specific Antigen Screen   Date Value Ref Range Status   2022 0.73 0.00 - 3.50 ng/mL Final   2019 0.9 0.0 - 2.5 ng/mL Final       Reviewed orders with patient. Reviewed health maintenance and updated orders accordingly - Yes  Lab work is in process    Reviewed and updated as needed this visit by clinical staff                  Reviewed and updated as needed this visit by Provider                 Past Medical History:   Diagnosis Date    Anxiety     CARDIOVASCULAR SCREENING; LDL GOAL LESS THAN 160  11/4/2011    History of ETOH abuse     ANTHONY (obstructive sleep apnea)     States he lost weight and no longer has    PONV (postoperative nausea and vomiting)     Sleep apnea       Past Surgical History:   Procedure Laterality Date    AS MUSC/TENDON REPAIR EACH; ARM/ELBOW Left     tendon injury - L arm    EYE SURGERY      lasik    HERNIA REPAIR Right 01/01/1988    OTHER SURGICAL HISTORY Left     knuckle surgerymiddle digit on left hand - due to OA    ZZHC COLONOSCOPY W/WO BRUSH/WASH N/A 05/25/2021    Procedure: COLONOSCOPY;  Surgeon: Octaviano Ambriz MD;  Location: [facility];  Service: General       Review of Systems  CONSTITUTIONAL: NEGATIVE for fever, chills, change in weight  INTEGUMENTARY/SKIN: NEGATIVE for worrisome rashes, moles or lesions  EYES: NEGATIVE for vision changes or irritation  ENT: NEGATIVE for ear, mouth and throat problems  RESP: NEGATIVE for significant cough or SOB  CV: NEGATIVE for chest pain, palpitations or peripheral edema  GI: NEGATIVE for nausea, abdominal pain, heartburn, or change in bowel habits   male: negative for dysuria, hematuria, decreased urinary stream, erectile dysfunction, urethral discharge  MUSCULOSKELETAL: NEGATIVE for significant arthralgias or myalgia  NEURO: NEGATIVE for weakness, dizziness or paresthesias  PSYCHIATRIC: NEGATIVE for changes in mood or affect    OBJECTIVE:   There were no vitals taken for this visit.    Physical Exam  GENERAL: healthy, alert and no distress  EYES: Eyes grossly normal to inspection, PERRL and conjunctivae and sclerae normal  HENT: ear canals and TM's normal, nose and mouth without ulcers or lesions  NECK: no adenopathy, no asymmetry, masses, or scars and thyroid normal to palpation  RESP: lungs clear to auscultation - no rales, rhonchi or wheezes  CV: regular rate and rhythm, normal S1 S2, no S3 or S4, no murmur, click or rub, no peripheral edema and peripheral pulses strong  ABDOMEN: soft, nontender, no hepatosplenomegaly, no  masses and bowel sounds normal  MS: no gross musculoskeletal defects noted, no edema  SKIN: no suspicious lesions or rashes  NEURO: Normal strength and tone, mentation intact and speech normal  PSYCH: mentation appears normal, affect normal/bright      ASSESSMENT/PLAN:     1. Routine general medical examination at a health care facility  Routine annual physical examination completed today.  Discussed routine healthcare maintenance including portance of healthy diet, regular exercise and routine cancer screenings.  The patient would like lab work today including cholesterol screening, screening for diabetes and prostate cancer screening.  Will also check metabolic panel and blood count.  He is due for tetanus booster today elects to have this along with influenza vaccine.  He declines COVID immunization at this time.  Referral for colonoscopy will be placed today.  He appears to be up-to-date on other routine healthcare maintenance.  - TDAP 10-64Y (ADACEL,BOOSTRIX)  - CBC with platelets; Future  - INFLUENZA VACCINE >6 MONTHS (AFLURIA/FLUZONE)  - CBC with platelets    2. Morbid obesity (H)  Encourage continued efforts at weight loss including healthy diet, regular exercise.  - Comprehensive metabolic panel (BMP + Alb, Alk Phos, ALT, AST, Total. Bili, TP); Future  - Hemoglobin A1c; Future  - Comprehensive metabolic panel (BMP + Alb, Alk Phos, ALT, AST, Total. Bili, TP)  - Hemoglobin A1c    3. Lipid screening  Will obtain fasting lipids today.  - Lipid panel reflex to direct LDL Fasting; Future  - Lipid panel reflex to direct LDL Fasting    4. Screening for prostate cancer  Family history of prostate cancer in his brother.  PSA will be checked today.  - PSA, screen; Future  - PSA, screen    5. Diabetes mellitus screening  Will check hemoglobin A1c to screen for diabetes.  - Hemoglobin A1c; Future  - Hemoglobin A1c    6. ANTHONY (obstructive sleep apnea)  Reviewed history of obstructive sleep apnea.    7. Personal history of  "colonic polyps  History of colonic polyps noted.  Referral for follow-up colonoscopy placed today as patient was recommended 3-year interval for screening.      Patient has been advised of split billing requirements and indicates understanding: Yes      COUNSELING:   Reviewed preventive health counseling, as reflected in patient instructions       Regular exercise       Healthy diet/nutrition       Colorectal cancer screening       Prostate cancer screening       Advance Care Planning      BMI:   Estimated body mass index is 37.77 kg/m  as calculated from the following:    Height as of 9/13/23: 1.676 m (5' 6\").    Weight as of 9/13/23: 106.1 kg (234 lb).   Weight management plan: Discussed healthy diet and exercise guidelines      He reports that he quit smoking about 26 years ago. His smoking use included cigarettes. He has never used smokeless tobacco.            CHASE Stinson CNP  [facility]    "

## 2024-01-22 ENCOUNTER — TELEPHONE (OUTPATIENT)
Dept: GASTROENTEROLOGY | Facility: CLINIC | Age: 54
End: 2024-01-22
Payer: COMMERCIAL

## 2024-02-14 ENCOUNTER — MYC MEDICAL ADVICE (OUTPATIENT)
Dept: FAMILY MEDICINE | Facility: CLINIC | Age: 54
End: 2024-02-14
Payer: COMMERCIAL

## 2024-02-14 ENCOUNTER — MYC REFILL (OUTPATIENT)
Dept: FAMILY MEDICINE | Facility: CLINIC | Age: 54
End: 2024-02-14
Payer: COMMERCIAL

## 2024-02-14 DIAGNOSIS — N52.9 PRIMARY ERECTILE DYSFUNCTION: ICD-10-CM

## 2024-02-14 DIAGNOSIS — E55.9 VITAMIN D DEFICIENCY: Primary | ICD-10-CM

## 2024-02-14 DIAGNOSIS — J31.0 CHRONIC RHINITIS: ICD-10-CM

## 2024-02-14 DIAGNOSIS — E66.01 MORBID OBESITY (H): ICD-10-CM

## 2024-02-14 DIAGNOSIS — T75.3XXD MOTION SICKNESS, SUBSEQUENT ENCOUNTER: ICD-10-CM

## 2024-02-14 RX ORDER — VITAMIN B COMPLEX
25 TABLET ORAL DAILY
Qty: 90 TABLET | Refills: 3 | Status: SHIPPED | OUTPATIENT
Start: 2024-02-14

## 2024-02-14 RX ORDER — TADALAFIL 5 MG/1
5 TABLET ORAL DAILY
Qty: 90 TABLET | Refills: 3 | Status: SHIPPED | OUTPATIENT
Start: 2024-02-14

## 2024-02-14 RX ORDER — TADALAFIL 5 MG/1
5 TABLET ORAL DAILY
Qty: 90 TABLET | Refills: 1 | Status: SHIPPED | OUTPATIENT
Start: 2024-02-14 | End: 2024-02-14

## 2024-02-14 RX ORDER — NALTREXONE HYDROCHLORIDE 50 MG/1
50 TABLET, FILM COATED ORAL DAILY
Qty: 90 TABLET | Refills: 1 | OUTPATIENT
Start: 2024-02-14

## 2024-02-14 RX ORDER — FEXOFENADINE HCL 180 MG/1
180 TABLET ORAL DAILY
Qty: 90 TABLET | Refills: 2 | Status: SHIPPED | OUTPATIENT
Start: 2024-02-14 | End: 2024-02-14

## 2024-02-14 RX ORDER — NALTREXONE HYDROCHLORIDE 50 MG/1
50 TABLET, FILM COATED ORAL DAILY
Qty: 90 TABLET | Refills: 3 | Status: SHIPPED | OUTPATIENT
Start: 2024-02-14

## 2024-02-14 RX ORDER — FLUTICASONE PROPIONATE 50 MCG
1 SPRAY, SUSPENSION (ML) NASAL DAILY
Qty: 16 G | Refills: 2 | Status: SHIPPED | OUTPATIENT
Start: 2024-02-14

## 2024-02-14 RX ORDER — FEXOFENADINE HCL 180 MG/1
180 TABLET ORAL DAILY
Qty: 90 TABLET | Refills: 3 | Status: SHIPPED | OUTPATIENT
Start: 2024-02-14

## 2024-02-14 RX ORDER — FLUTICASONE PROPIONATE 50 MCG
1 SPRAY, SUSPENSION (ML) NASAL DAILY
Qty: 16 G | Refills: 2 | Status: SHIPPED | OUTPATIENT
Start: 2024-02-14 | End: 2024-02-14

## 2024-04-13 ENCOUNTER — MYC MEDICAL ADVICE (OUTPATIENT)
Dept: FAMILY MEDICINE | Facility: CLINIC | Age: 54
End: 2024-04-13
Payer: COMMERCIAL

## 2024-04-15 ENCOUNTER — NURSE TRIAGE (OUTPATIENT)
Dept: FAMILY MEDICINE | Facility: CLINIC | Age: 54
End: 2024-04-15
Payer: COMMERCIAL

## 2024-04-15 NOTE — TELEPHONE ENCOUNTER
"Nurse Triage SBAR    Situation: Per [employer] message from 4/13/24:  \"Getachew Brown     I was wondering I had a tetanus shot back on   1-15-24 and am still having pain and muscle fatigue in that area, it actually feels like a bruise. I know it has been a bit of time but thought it would eventually go away. I was wondering what I should do with this issue?     Thank you   Raheem Jeong\"    Background:   Left deltoid- Tdap on 1/15/24    Assessment:   Needs to triage/gather more info.    Recommendation:   Left message to return call. Please route to RN queue to triage sx. Left message writer will respond via MyC message as well.    [place], MARISOLN, RN  [facility]         "

## 2024-04-15 NOTE — TELEPHONE ENCOUNTER
"Nurse Triage SBAR    Is this a 2nd Level Triage? NO    Situation:   See [employer] message into the clinic on 4/13/24 to report left arm pain after a tetanus vaccine:    \"Getachew Brown     I was wondering I had a tetanus shot back on   1-15-24 and am still having pain and muscle fatigue in that area, it actually feels like a bruise. I know it has been a bit of time but thought it would eventually go away. I was wondering what I should do with this issue?     Thank you   Raheem Jeong\"    Background:   Hx of obesity and ANTHONY    Assessment:  Patient reports ongoing pain and muscle weakness in his left arm after receiving a tetanus (TDAP) vaccine on 1/15/24    Stated that he has muscle weakness when he lifts his left arm over his head, although his arm is not sore when laying his arm by his side    Slight lump noted in the vaccine area-feels like a bruise deep in the muscle, although no bruise noted    Rates pain as 6-7 out of ten     States he has not had vaccine reactions in the past    Denies redness or swelling or infection in his left arm    Denies chest pain, trouble breathing, dizziness or lightheadedness, or fever     Protocol Recommended Disposition:   See in Office Today or Tomorrow    Recommendation:   Gave care advice. Recommended that the patient be evaluated in clinic today or tomorrow as per disposition.    Patient verbalized understanding and agrees with the plan.    Writer assisted the patient with scheduling an in-clinic appointment at the [facility] tomorrow, Tuesday, 4/16/24, at 4:00 pm, with Dr. Mahsa Kearns.    Denies other questions or concerns at this time.    Does the patient meet one of the following criteria for ADS visit consideration? 16+ years old, with an [facility] PCP     TIP  Providers, please consider if this condition is appropriate for management at one of our Acute and Diagnostic Services sites.     If patient is a good candidate, please use dotphrase <dot>triageresponse and select " "Refer to ADS to document.     Reason for Disposition   Deep lump follows (in 2 to 8 weeks) Td or TDaP shot, and becomes tender to the touch    Additional Information   Negative: Difficulty breathing or swallowing starts within 2 hours after injection   Negative: Difficult to awaken or acting confused (e.g., disoriented, slurred speech)   Negative: Unresponsive, passed out, or very weak   Negative: Sounds like a life-threatening emergency to the triager   Negative: COVID-19 vaccine reaction suspected (e.g., fever, headache, muscle aches occurring during days 13 after getting vaccine)   Negative: COVID-19 vaccine, questions about   Negative: Fever > 104 F (40 C)   Negative: Measles vaccine rash (onset day 6-12) and purple or blood-colored   Negative: Sounds like a severe, unusual reaction to the triager   Negative: Fever > 101 F (38.3 C) begins > 48 hours after getting vaccine and over 60 years of age   Negative: Fever > 100.0 F (37.8 C) and has diabetes mellitus or a weak immune system (e.g., HIV positive, cancer chemotherapy, organ transplant, splenectomy, chronic steroids)   Negative: Fever > 100.0 F (37.8 C) and bedridden (e.g., CVA, chronic illness, recovering from surgery)   Negative: Redness or red streak around the injection site begins > 48 hours after shot   Negative: Fever present > 3 days (72 hours)   Negative: Smallpox vaccine and eye pain, eye redness, or rash on eyelids    Answer Assessment - Initial Assessment Questions  1. SYMPTOMS: \"What is the main symptom?\" (e.g., redness, swelling, pain)         Soreness in site of vaccine-feels like a bruise-feels deep in the muscle-no redness or swelling or bruise    2. ONSET: \"When was the vaccine (shot) given?\" \"How much later did the soreness begin?\" (e.g., hours, days ago)         1/15/2024-no reaction before to vaccines    3. SEVERITY: \"How bad is it?\"         Rated as 6-7 out of ten pain    4. FEVER: \"Is there a fever?\" If Yes, ask: \"What is it, how was it " "measured, and when did it start?\"         No fever    5. IMMUNIZATIONS GIVEN: \"What shots have you recently received?\"        TDap    6. PAST REACTIONS: \"Have you reacted to immunizations before?\" If Yes, ask: \"What happened?\"      None noted    7. OTHER SYMPTOMS: \"Do you have any other symptoms?\"        No chest pain or trouble breathing, dizziness or lightheadedness,    Protocols used: Immunization [id number]-[id number]    Maura Josue RN, BSN  [facility]    "

## 2024-04-16 ENCOUNTER — OFFICE VISIT (OUTPATIENT)
Dept: FAMILY MEDICINE | Facility: CLINIC | Age: 54
End: 2024-04-16
Payer: COMMERCIAL

## 2024-04-16 VITALS
HEIGHT: 68 IN | BODY MASS INDEX: 35.83 KG/M2 | RESPIRATION RATE: 16 BRPM | DIASTOLIC BLOOD PRESSURE: 73 MMHG | SYSTOLIC BLOOD PRESSURE: 118 MMHG | TEMPERATURE: 97.8 F | WEIGHT: 236.4 LBS | HEART RATE: 82 BPM | OXYGEN SATURATION: 97 %

## 2024-04-16 DIAGNOSIS — M75.52 DELTOID BURSITIS, LEFT: Primary | ICD-10-CM

## 2024-04-16 PROCEDURE — 99213 OFFICE O/P EST LOW 20 MIN: CPT | Performed by: FAMILY MEDICINE

## 2024-04-16 NOTE — PROGRESS NOTES
Assessment & Plan     Deltoid bursitis, left  Patient noted onset of pain coincident with his Tdap vaccine and has good, relatively pain-free ROM of his shoulder, suggesting against a rotator cuff pathology.  Discussed options including anti-inflammatories and ice/heat to the area for inflammation reduction.  Patient feels that he has maximized conservative therapies, and so referred to Ortho for a discussion of other treatments, possibly steroid injection.  - Orthopedic  Referral; Future                  Subjective   Raheem is a 53 year old, presenting for the following health issues:  Shoulder Pain (Left shoulder pain after TDAP vaccine on 1/15/24; painful ROM when lifting left arm)        4/16/2024     3:50 PM   Additional Questions   Roomed by Abbie BABCOCK LPN     History of Present Illness       Reason for visit:  Pain in left shoulder from tetanus shot  Symptom onset:  More than a month  Symptom intensity:  Moderate  Symptom progression:  Staying the same  Had these symptoms before:  No    He eats 2-3 servings of fruits and vegetables daily.He consumes 5 sweetened beverage(s) daily.He exercises with enough effort to increase his heart rate 9 or less minutes per day.  He exercises with enough effort to increase his heart rate 3 or less days per week.   He is taking medications regularly.     Patient presents today with left arm pain after receiving a Tdap vaccine on 1/15/24.  The discomfort is worse with certain movements of the arm, and he can pinpoint the area of maximal tenderness over his deltoid.  He feels that this was related to his vaccination.  He denies any redness or rash around the area at the time of his vaccination or since.  He never had any fever.  He denies any arm injury, and is right-hand dominant.        Review of Systems  Constitutional, HEENT, cardiovascular, pulmonary, gi and gu systems are negative, except as otherwise noted.      Objective    /73   Pulse 82   Temp 97.8  F  "(36.6  C) (Oral)   Resp 16   Ht 1.729 m (5' 8.07\")   Wt 107.2 kg (236 lb 6.4 oz)   SpO2 97%   BMI 35.87 kg/m    Body mass index is 35.87 kg/m .  Physical Exam   GENERAL: alert and no distress  RESP: breathing comfortably, no cough during the visit  MS: full ROM left shoulder, mild pain with abduction past 90 degrees, Winston and Neer testing negative  SKIN: no erythema over the left deltoid  PSYCH: mentation appears normal, affect normal/bright    Diagnostics: None        Signed Electronically by: Mahsa Kearns MD    "

## 2024-04-25 ENCOUNTER — TRANSFERRED RECORDS (OUTPATIENT)
Dept: HEALTH INFORMATION MANAGEMENT | Facility: CLINIC | Age: 54
End: 2024-04-25
Payer: COMMERCIAL

## 2024-07-04 ENCOUNTER — OFFICE VISIT (OUTPATIENT)
Dept: FAMILY MEDICINE | Facility: CLINIC | Age: 54
End: 2024-07-04
Payer: COMMERCIAL

## 2024-07-04 VITALS
RESPIRATION RATE: 18 BRPM | WEIGHT: 234.5 LBS | TEMPERATURE: 98.5 F | OXYGEN SATURATION: 96 % | BODY MASS INDEX: 35.58 KG/M2 | HEART RATE: 81 BPM | SYSTOLIC BLOOD PRESSURE: 125 MMHG | DIASTOLIC BLOOD PRESSURE: 78 MMHG

## 2024-07-04 DIAGNOSIS — H65.03 NON-RECURRENT ACUTE SEROUS OTITIS MEDIA OF BOTH EARS: ICD-10-CM

## 2024-07-04 DIAGNOSIS — J06.9 VIRAL URI WITH COUGH: ICD-10-CM

## 2024-07-04 DIAGNOSIS — H10.33 ACUTE BACTERIAL CONJUNCTIVITIS OF BOTH EYES: Primary | ICD-10-CM

## 2024-07-04 PROCEDURE — 99213 OFFICE O/P EST LOW 20 MIN: CPT

## 2024-07-04 RX ORDER — OXYMETAZOLINE HYDROCHLORIDE 0.05 G/100ML
2 SPRAY NASAL 2 TIMES DAILY
Qty: 2 ML | Refills: 0 | Status: SHIPPED | OUTPATIENT
Start: 2024-07-04 | End: 2024-07-07

## 2024-07-04 RX ORDER — POLYMYXIN B SULFATE AND TRIMETHOPRIM 1; 10000 MG/ML; [USP'U]/ML
1-2 SOLUTION OPHTHALMIC EVERY 6 HOURS
Qty: 10 ML | Refills: 0 | Status: SHIPPED | OUTPATIENT
Start: 2024-07-04 | End: 2024-07-11

## 2024-07-04 NOTE — PATIENT INSTRUCTIONS
Your eye redness is most likely viral conjunctivitis. Your body will clear up this infection on its own. You should use wet & warm compresses for 10 minutes 3-4 times a day, this will help sooth the area and gently remove any discharge/crusting. If you continue to have eye redness and discharge that isn't getting better after 7 days, use the antibiotic drops I prescribed. Do not use contact lenses until you are feeling better and your symptoms have gone away.    If you ever have sensitivity to light, severe headache with nausea, or a severe foreign body sensation go to the ED to see an ophthalmologist urgently.    If you do need to take the antibiotic eye drops, use one to two drops of trimethoprim-polymyxin B, four times daily for five to seven days to the affected eye.    -----------------------------------    For sinus pressure and congestion, use nasal sprays as below:    1) Afrin twice a day for 3 days (do not continue past 3 days as it can make congestion worse after that)  2) Flonase twice a day for 14 days or until symptoms completely gone  3) Nasal saline spray 2-4 times a day for 14 days or until symptoms completely gone.    Also use tylenol and ibuprofen as needed for pain.

## 2024-07-04 NOTE — PROGRESS NOTES
SUBJECTIVE:  Chief Complaint:   Chief Complaint   Patient presents with    Sinus Problem     X couple of weeks, coughing (coughing up mucus), both eyes redness and puffy, no wheezing, no SOB, slight wheezing in chest, no fever     History of Present Illness:  Raheem Jeong is a 53 year old male who presents complaining of moderate both eyes discharge, redness for 2 day(s). In the context of viral illness for 2 weeks.  Onset/timing: gradual.    Associated Signs and Symptoms: cough productive  Treatment measures tried include: warm packs  Contact wearer : No    Past Medical History:   Diagnosis Date    Anxiety     CARDIOVASCULAR SCREENING; LDL GOAL LESS THAN 160 11/4/2011    History of ETOH abuse     ANTHONY (obstructive sleep apnea)     States he lost weight and no longer has    PONV (postoperative nausea and vomiting)     Sleep apnea      Current Outpatient Medications   Medication Sig Dispense Refill    fexofenadine (ALLEGRA) 180 MG tablet Take 1 tablet (180 mg) by mouth daily 90 tablet 3    fluticasone (FLONASE) 50 MCG/ACT nasal spray Spray 1 spray into both nostrils daily 16 g 2    multivitamin w/minerals (THERA-VIT-M) tablet Take 1 tablet by mouth daily      naltrexone (DEPADE/REVIA) 50 MG tablet Take 1 tablet (50 mg) by mouth daily 90 tablet 3    scopolamine (TRANSDERM) 1 MG/3DAYS 72 hr patch PLACE ONE PATCH ONTO THE SKIN EVERY 72 HOURS 4 patch 0    tadalafil (CIALIS) 5 MG tablet Take 1 tablet (5 mg) by mouth daily 90 tablet 3    Vitamin D3 (CHOLECALCIFEROL) 25 mcg (1000 units) tablet Take 1 tablet (25 mcg) by mouth daily 90 tablet 3     ROS:  Review of systems negative except as stated above.    OBJECTIVE:  /78   Pulse 81   Temp 98.5  F (36.9  C) (Oral)   Resp 18   Wt 106.4 kg (234 lb 8 oz)   SpO2 96%   BMI 35.58 kg/m    General: no acute distress  Eye exam: Bilateral conjunctival injection and erythema with purulent discharge.  Visual acuity bilaterally intact.  Ear exam: Tms bulging bilaterally with  clear fluid appreciated, no purulence.  Respiratory exam: Clear to auscultation bilaterally, no crackles, no wheezing, no coarseness appreciated.  Cardiac exam: Regular rate and rhythm no murmurs    ASSESSMENT:  Bilateral bacterial conjunctivitis in the setting of 2 weeks of viral URI.  Given evidence of bilateral serous otitis media in addition to history of acute otitis media, prescribed nasal sprays for decongestion.  No focal physical exam findings concerning for pneumonia so x-ray not completed at this time.    PLAN:  Warm packs for comfort. Polytrim ophthalmic drops-1-2 drops in the affected eye(s) every 4 hours while awake for 3 days.  Afrin, flonase, and nasal saline for serous otitis media.  Supportive cares and return precautions discussed.  See orders in epic

## 2024-07-08 ENCOUNTER — ANESTHESIA EVENT (OUTPATIENT)
Dept: SURGERY | Facility: AMBULATORY SURGERY CENTER | Age: 54
End: 2024-07-08
Payer: COMMERCIAL

## 2024-07-09 ENCOUNTER — HOSPITAL ENCOUNTER (OUTPATIENT)
Facility: AMBULATORY SURGERY CENTER | Age: 54
Discharge: HOME | End: 2024-07-09
Attending: SURGERY
Payer: COMMERCIAL

## 2024-07-09 ENCOUNTER — ANESTHESIA (OUTPATIENT)
Dept: SURGERY | Facility: AMBULATORY SURGERY CENTER | Age: 54
End: 2024-07-09
Payer: COMMERCIAL

## 2024-07-09 VITALS
TEMPERATURE: 96.9 F | RESPIRATION RATE: 16 BRPM | HEART RATE: 67 BPM | SYSTOLIC BLOOD PRESSURE: 119 MMHG | DIASTOLIC BLOOD PRESSURE: 87 MMHG | OXYGEN SATURATION: 96 %

## 2024-07-09 DIAGNOSIS — Z86.0100 PERSONAL HISTORY OF COLONIC POLYPS: ICD-10-CM

## 2024-07-09 PROCEDURE — 45380 COLONOSCOPY AND BIOPSY: CPT | Mod: 51 | Performed by: SURGERY

## 2024-07-09 PROCEDURE — 45385 COLONOSCOPY W/LESION REMOVAL: CPT | Mod: PT | Performed by: SURGERY

## 2024-07-09 RX ORDER — ONDANSETRON 2 MG/ML
4 INJECTION INTRAMUSCULAR; INTRAVENOUS
Status: DISCONTINUED | OUTPATIENT
Start: 2024-07-09 | End: 2024-07-10 | Stop reason: HOSPADM

## 2024-07-09 RX ORDER — LIDOCAINE HYDROCHLORIDE 20 MG/ML
INJECTION, SOLUTION INFILTRATION; PERINEURAL PRN
Status: DISCONTINUED | OUTPATIENT
Start: 2024-07-09 | End: 2024-07-09

## 2024-07-09 RX ORDER — ONDANSETRON 2 MG/ML
4 INJECTION INTRAMUSCULAR; INTRAVENOUS EVERY 30 MIN PRN
Status: DISCONTINUED | OUTPATIENT
Start: 2024-07-09 | End: 2024-07-10 | Stop reason: HOSPADM

## 2024-07-09 RX ORDER — PROPOFOL 10 MG/ML
INJECTION, EMULSION INTRAVENOUS CONTINUOUS PRN
Status: DISCONTINUED | OUTPATIENT
Start: 2024-07-09 | End: 2024-07-09

## 2024-07-09 RX ORDER — OXYCODONE HYDROCHLORIDE 5 MG/1
5 TABLET ORAL
Status: DISCONTINUED | OUTPATIENT
Start: 2024-07-09 | End: 2024-07-10 | Stop reason: HOSPADM

## 2024-07-09 RX ORDER — SODIUM CHLORIDE, SODIUM LACTATE, POTASSIUM CHLORIDE, CALCIUM CHLORIDE 600; 310; 30; 20 MG/100ML; MG/100ML; MG/100ML; MG/100ML
INJECTION, SOLUTION INTRAVENOUS CONTINUOUS
Status: DISCONTINUED | OUTPATIENT
Start: 2024-07-09 | End: 2024-07-10 | Stop reason: HOSPADM

## 2024-07-09 RX ORDER — PROPOFOL 10 MG/ML
INJECTION, EMULSION INTRAVENOUS PRN
Status: DISCONTINUED | OUTPATIENT
Start: 2024-07-09 | End: 2024-07-09

## 2024-07-09 RX ORDER — LIDOCAINE 40 MG/G
CREAM TOPICAL
Status: DISCONTINUED | OUTPATIENT
Start: 2024-07-09 | End: 2024-07-10 | Stop reason: HOSPADM

## 2024-07-09 RX ORDER — OXYCODONE HYDROCHLORIDE 10 MG/1
10 TABLET ORAL
Status: DISCONTINUED | OUTPATIENT
Start: 2024-07-09 | End: 2024-07-10 | Stop reason: HOSPADM

## 2024-07-09 RX ORDER — ONDANSETRON 4 MG/1
4 TABLET, ORALLY DISINTEGRATING ORAL EVERY 30 MIN PRN
Status: DISCONTINUED | OUTPATIENT
Start: 2024-07-09 | End: 2024-07-10 | Stop reason: HOSPADM

## 2024-07-09 RX ORDER — DEXAMETHASONE SODIUM PHOSPHATE 4 MG/ML
4 INJECTION, SOLUTION INTRA-ARTICULAR; INTRALESIONAL; INTRAMUSCULAR; INTRAVENOUS; SOFT TISSUE
Status: DISCONTINUED | OUTPATIENT
Start: 2024-07-09 | End: 2024-07-10 | Stop reason: HOSPADM

## 2024-07-09 RX ORDER — GLYCOPYRROLATE 0.2 MG/ML
INJECTION, SOLUTION INTRAMUSCULAR; INTRAVENOUS PRN
Status: DISCONTINUED | OUTPATIENT
Start: 2024-07-09 | End: 2024-07-09

## 2024-07-09 RX ORDER — NALOXONE HYDROCHLORIDE 0.4 MG/ML
0.1 INJECTION, SOLUTION INTRAMUSCULAR; INTRAVENOUS; SUBCUTANEOUS
Status: DISCONTINUED | OUTPATIENT
Start: 2024-07-09 | End: 2024-07-10 | Stop reason: HOSPADM

## 2024-07-09 RX ADMIN — SODIUM CHLORIDE, SODIUM LACTATE, POTASSIUM CHLORIDE, CALCIUM CHLORIDE: 600; 310; 30; 20 INJECTION, SOLUTION INTRAVENOUS at 06:19

## 2024-07-09 RX ADMIN — PROPOFOL 25 MG: 10 INJECTION, EMULSION INTRAVENOUS at 07:19

## 2024-07-09 RX ADMIN — PROPOFOL 50 MG: 10 INJECTION, EMULSION INTRAVENOUS at 07:15

## 2024-07-09 RX ADMIN — GLYCOPYRROLATE 0.2 MG: 0.2 INJECTION, SOLUTION INTRAMUSCULAR; INTRAVENOUS at 07:15

## 2024-07-09 RX ADMIN — LIDOCAINE HYDROCHLORIDE 50 MG: 20 INJECTION, SOLUTION INFILTRATION; PERINEURAL at 07:15

## 2024-07-09 RX ADMIN — PROPOFOL 200 MCG/KG/MIN: 10 INJECTION, EMULSION INTRAVENOUS at 07:15

## 2024-07-09 RX ADMIN — PROPOFOL 25 MG: 10 INJECTION, EMULSION INTRAVENOUS at 07:17

## 2024-07-09 NOTE — DISCHARGE INSTRUCTIONS
If you have any questions or concerns regarding your procedure, please contact Dr. Ambriz, his office number is [phone].    Recommendation:   Next Colonoscopy in 3 years; the year 2027       Adult Discharge Instructions     For 24 hours after surgery    Get plenty of rest.  A responsible adult must stay with you for at least 24 hours after you leave the hospital.     Do not drive or use heavy equipment.  If you have weakness or tingling, don't drive or use heavy equipment until this feeling goes away.    Do not drink alcohol.    Avoid strenuous or risky activities.  Ask for help when climbing stairs.     You may feel lightheaded.  If so, sit for a few minutes before standing.  Have someone help you get up.     You may have a slight fever. Call the doctor if your fever is over 100 F (37.7 C) (taken under the tongue) or lasts longer than 24 hours.    You may have a dry mouth, a sore throat, muscle aches or trouble sleeping.  These should go away after 24 hours.    Do not make important or legal decisions.    Based on the surgery/procedure that you had today, we do not expect that you will have any problems.  However, we want you to know what to do if you have pain, nausea, bleeding, or infection:    To control pain:  Take medicines your physician has prescribed or or over-the counter medicine he or she advises.  Ice packs and periods of rest are often helpful.  For surgery on an arm or leg, raise it on a pillow to ease swelling.  If your pain is not managed with the above methods, contact your physician.      To control nausea:  Take anti-nausea medicine approved by your physician.  Drink clear liquids such as apple juice, ginger ale, broth or 7-Up. Be sure to drink enough fluids.  Move to a regular diet as you feel able.  Rest may also help.    Bleeding:  You may see a little blood on your dressing, about the size of a quarter in the first 24 hours.  If you see this, there is no reason to be alarmed.   However, if this continues to increase in size, apply pressure if able, and notify your physician.      Infection: Please contact your physician if you have any of the following signs:  redness, swelling, heat, increasing pain or foul-smelling drainage at your surgery site, fever or chills.    Call your doctor for any of the followin.  It has been over 8 to 10 hours since surgery and you are still not able to urinate (pass water).    2.  Headache for over 24 hours.    3.  Numbness, tingling or weakness in your legs the day after surgery (if you had spinal anesthesia).

## 2024-07-09 NOTE — ANESTHESIA POSTPROCEDURE EVALUATION
Patient: Raheem Jeong    Procedure: Procedure(s):  COLONOSCOPY, BIOPSY and POLYPECTOMY       Anesthesia Type:  MAC    Note:  Disposition: Outpatient   Postop Pain Control: Uneventful            Sign Out: Well controlled pain   PONV: No   Neuro/Psych: Uneventful            Sign Out: Acceptable/Baseline neuro status   Airway/Respiratory: Uneventful            Sign Out: Acceptable/Baseline resp. status   CV/Hemodynamics: Uneventful            Sign Out: Acceptable CV status; No obvious hypovolemia; No obvious fluid overload   Other NRE:    DID A NON-ROUTINE EVENT OCCUR?        Last vitals:  Vitals Value Taken Time   /87 07/09/24 0815   Temp 96.9  F (36.1  C) 07/09/24 0739   Pulse 74 07/09/24 0825   Resp 16 07/09/24 0815   SpO2 97 % 07/09/24 0825   Vitals shown include unfiled device data.    Electronically Signed By: Franky Luna MD  July 9, 2024  12:31 PM

## 2024-07-09 NOTE — OP NOTE
COLONOSCOPY REPORT      Pre-op Dx:              Screening colonoscopy      Procedure:             Colonoscopy      Indications:            Colon Cancer Screening      Findings:                2 polyps were found.  There is a 5 mm polyp in the descending colon at 40 cm that was removed with a snare and there was a 3 mm polyp removed with a cold biopsy forceps in the sigmoid colon at 35 cm.    Procedure:              The patient is brought to the endoscopy suite placed in a lateral position and the patient is given conscious sedation anesthesia.  The colonoscope was advanced atraumatically into the anus taken all way up and around to the cecum.  I came across the tattooed area in the proximal transverse colon and this region was sampled with random biopsies using cold biopsy forceps.  The ileocecal valve and the appendiceal orifice are clearly identified.  The scope was slowly drawn back and 2 polyps were found.  There is a 5 mm polyp in the descending colon at 40 cm that was removed with a snare and there was a 3 mm polyp removed with a cold biopsy forceps in the sigmoid colon at 35 cm.  The patient did not have sigmoid diverticular disease.   The scope was drawn back into the rectum and retroflexed I do not see evidence for any significant hemorrhoidal disease.  The colonoscope was straightened and taken up to the splenic flexure areas aspirated from the left side of the colon as the scope was withdrawn.    Withdrawal Time:  8:30    EBL:                        None      Medications:         MAC; see anesthesia note for details          Complications:      None      Post-op Dx:            Normal Colonoscopy      Recommendation:   Next Colonoscopy in 3 years; the year 2027      Octaviano Ambriz MD  7/9/2024 7:40 AM  [facility]  [phone]

## 2024-07-09 NOTE — H&P
PRE COLONOSCOPY EVALUATION   H&P       ASSESSMENT     Raheem Jeong is a 53 year old male who is in today for a Screening Colonoscopy.  The patient has not had a stool screening test.  He has a history of polys         PLAN      Screening Colonoscopy         HPI     Raheem Jeong is a 53 year old male who presents for a colonoscopy.  They do not have a family history of colon cancer  They have a history of polyps  They have not been loosing weight  They have not noted any change in bowel habits   They have not had blood in their stool.         PAST MEDICAL HISTORY SURGICAL HISTORY     Past Medical History:   Diagnosis Date    Anxiety     CARDIOVASCULAR SCREENING; LDL GOAL LESS THAN 160 11/04/2011    History of ETOH abuse     ANTHONY (obstructive sleep apnea)     States he lost weight and no longer has    PONV (postoperative nausea and vomiting)     Sleep apnea     Past Surgical History:   Procedure Laterality Date    AS MUSC/TENDON REPAIR EACH; ARM/ELBOW Left     tendon injury - L arm    EYE SURGERY      lasik    HERNIA REPAIR Right 01/01/1988    OTHER SURGICAL HISTORY Left     knuckle surgerymiddle digit on left hand - due to OA    ZZHC COLONOSCOPY W/WO BRUSH/WASH N/A 05/25/2021    Procedure: COLONOSCOPY;  Surgeon: Octaviano Ambriz MD;  Location: [facility];  Service: General          CURRENT MEDICATIONS ALLERGIES     Current Outpatient Rx   Medication Sig Dispense Refill    fexofenadine (ALLEGRA) 180 MG tablet Take 1 tablet (180 mg) by mouth daily 90 tablet 3    fluticasone (FLONASE) 50 MCG/ACT nasal spray Spray 1 spray into both nostrils daily 16 g 2    multivitamin w/minerals (THERA-VIT-M) tablet Take 1 tablet by mouth daily      naltrexone (DEPADE/REVIA) 50 MG tablet Take 1 tablet (50 mg) by mouth daily 90 tablet 3    polymixin b-trimethoprim (POLYTRIM) 51505-0.1 UNIT/ML-% ophthalmic solution Place 1-2 drops into both eyes every 6 hours for 7 days 10 mL 0    scopolamine (TRANSDERM) 1 MG/3DAYS 72 hr patch  PLACE ONE PATCH ONTO THE SKIN EVERY 72 HOURS 4 patch 0    sodium chloride (OCEAN) 0.65 % nasal spray 2-4 times a day for 14 days or until feeling totally better. 60 mL 0    Vitamin D3 (CHOLECALCIFEROL) 25 mcg (1000 units) tablet Take 1 tablet (25 mcg) by mouth daily 90 tablet 3    tadalafil (CIALIS) 5 MG tablet Take 1 tablet (5 mg) by mouth daily 90 tablet 3    No Known Allergies       FAMILY HISTORY     Family History   Problem Relation Age of Onset    Pacemaker Mother     Hypertension Mother     Diabetes Father     Mental Illness Sister         overdose    Cancer Brother         prostate    Diabetes Brother     Pacemaker Maternal Grandmother     Arthritis Maternal Grandfather         hip issues    No Known Problems Paternal Grandmother     No Known Problems Paternal Grandfather     Colon Cancer Maternal Aunt     Colon Cancer Maternal Uncle          SOCIAL HISTORY     Social History     Socioeconomic History    Marital status:      Spouse name: None    Number of children: None    Years of education: None    Highest education level: None   Occupational History    Occupation: [employer]     Employer: [employer]   Tobacco Use    Smoking status: Former     Current packs/day: 0.00     Types: Cigarettes     Quit date: 1998     Years since quittin.5    Smokeless tobacco: Never   Substance and Sexual Activity    Alcohol use: No     Comment: quit 8 years ()    Drug use: No    Sexual activity: Yes     Partners: Female     Social Determinants of Health     Financial Resource Strain: Low Risk  (1/15/2024)    Financial Resource Strain     Within the past 12 months, have you or your family members you live with been unable to get utilities (heat, electricity) when it was really needed?: No   Food Insecurity: Low Risk  (1/15/2024)    Food Insecurity     Within the past 12 months, did you worry that your food would run out before you got money to buy more?: No     Within the past 12 months, did the food you  bought just not last and you didn t have money to get more?: No   Transportation Needs: Low Risk  (1/15/2024)    Transportation Needs     Within the past 12 months, has lack of transportation kept you from medical appointments, getting your medicines, non-medical meetings or appointments, work, or from getting things that you need?: No   Interpersonal Safety: Low Risk  (1/15/2024)    Interpersonal Safety     Do you feel physically and emotionally safe where you currently live?: Yes     Within the past 12 months, have you been hit, slapped, kicked or otherwise physically hurt by someone?: No     Within the past 12 months, have you been humiliated or emotionally abused in other ways by your partner or ex-partner?: No   Housing Stability: Low Risk  (1/15/2024)    Housing Stability     Do you have housing? : Yes     Are you worried about losing your housing?: No         REVIEW OF SYSTEMS       10 point review of systems are unremarkable except for the symptoms described in the HPI    PHYSICAL EXAM     VITAL SIGNS: /86   Temp 97  F (36.1  C) (Temporal)   Resp 16   SpO2 95%    General : Alert, cooperative, appears stated age   Head: Normocephalic, without obvious abnormality,   HEENT:  conjunctiva/corneas clear, EOM's intact, no scleral icterus   Lungs: Clear to auscultation bilaterally, respirations unlabored  Heart: Regular rate and rhythm, S1, S2 normal, no murmur, rub or gallop   Abdomen: Soft, non-tender, no guarding, + bowel sounds active,   Extremities : No obvious swelling,  Neurologic:  moves all extremities to command, no focal findings    Airway: Class 2  ASA:   2      [facility]  [phone]

## 2024-07-09 NOTE — ANESTHESIA PREPROCEDURE EVALUATION
Anesthesia Pre-Procedure Evaluation    Patient: Raheem Jeong   MRN: [id number] : 1970        Procedure : Procedure(s):  COLONOSCOPY          Past Medical History:   Diagnosis Date     Anxiety      CARDIOVASCULAR SCREENING; LDL GOAL LESS THAN 160 2011     History of ETOH abuse      ANTHONY (obstructive sleep apnea)     States he lost weight and no longer has     PONV (postoperative nausea and vomiting)      Sleep apnea       Past Surgical History:   Procedure Laterality Date     AS MUSC/TENDON REPAIR EACH; ARM/ELBOW Left     tendon injury - L arm     EYE SURGERY      lasik     HERNIA REPAIR Right 1988     OTHER SURGICAL HISTORY Left     knuckle surgerymiddle digit on left hand - due to OA     ZZHC COLONOSCOPY W/WO BRUSH/WASH N/A 2021    Procedure: COLONOSCOPY;  Surgeon: Octaviano Ambriz MD;  Location: [facility];  Service: General      No Known Allergies   Social History     Tobacco Use     Smoking status: Former     Current packs/day: 0.00     Types: Cigarettes     Quit date: 1998     Years since quittin.5     Smokeless tobacco: Never   Substance Use Topics     Alcohol use: No     Comment: quit 8 years ()      Wt Readings from Last 1 Encounters:   24 106.4 kg (234 lb 8 oz)        Anesthesia Evaluation            ROS/MED HX  ENT/Pulmonary:     (+) sleep apnea, moderate,                                       Neurologic:  - neg neurologic ROS     Cardiovascular:  - neg cardiovascular ROS     METS/Exercise Tolerance: >4 METS    Hematologic:  - neg hematologic  ROS     Musculoskeletal:  - neg musculoskeletal ROS     GI/Hepatic:  - neg GI/hepatic ROS     Renal/Genitourinary:  - neg Renal ROS     Endo:     (+)               Obesity,       Psychiatric/Substance Use:  - neg psychiatric ROS     Infectious Disease:  - neg infectious disease ROS     Malignancy:  - neg malignancy ROS     Other:  - neg other ROS          Physical Exam    Airway  airway exam normal      Mallampati:  "II       Respiratory Devices and Support         Dental  no notable dental history         Cardiovascular   cardiovascular exam normal       Rhythm and rate: regular and normal     Pulmonary   pulmonary exam normal        breath sounds clear to auscultation       OUTSIDE LABS:  CBC:   Lab Results   Component Value Date    WBC 5.7 01/15/2024    WBC 6.0 08/29/2023    HGB 16.0 01/15/2024    HGB 14.2 08/29/2023    HCT 44.8 01/15/2024    HCT 41.6 08/29/2023     01/15/2024     08/29/2023     BMP:   Lab Results   Component Value Date     01/15/2024     08/29/2023    POTASSIUM 3.9 01/15/2024    POTASSIUM 3.8 08/29/2023    CHLORIDE 107 01/15/2024    CHLORIDE 105 08/29/2023    CO2 25 01/15/2024    CO2 25 08/29/2023    BUN 8.7 01/15/2024    BUN 8.8 08/29/2023    CR 0.74 01/15/2024    CR 0.78 08/29/2023    GLC 95 01/15/2024    GLC 94 08/29/2023     COAGS: No results found for: \"PTT\", \"INR\", \"FIBR\"  POC: No results found for: \"BGM\", \"HCG\", \"HCGS\"  HEPATIC:   Lab Results   Component Value Date    ALBUMIN 4.5 01/15/2024    PROTTOTAL 7.9 01/15/2024    ALT 20 01/15/2024    AST 25 01/15/2024    ALKPHOS 56 01/15/2024    BILITOTAL 0.4 01/15/2024     OTHER:   Lab Results   Component Value Date    A1C 5.4 01/15/2024    SHELLI 9.7 01/15/2024    TSH 0.99 05/05/2022       Anesthesia Plan    ASA Status:  2       Anesthesia Type: MAC.   Induction: Intravenous, Propofol.   Maintenance: TIVA.        Consents    Anesthesia Plan(s) and associated risks, benefits, and realistic alternatives discussed. Questions answered and patient/representative(s) expressed understanding.     - Discussed:     - Discussed with:  Patient, Spouse      - Extended Intubation/Ventilatory Support Discussed: No.      - Patient is DNR/DNI Status: No     Use of blood products discussed: No .     Postoperative Care    Pain management: IV analgesics.   PONV prophylaxis: Ondansetron (or other 5HT-3), Dexamethasone or Solumedrol     Comments:    Other " Comments: Propofol infusion           Franky Luna MD    I have reviewed the pertinent notes and labs in the chart from the past 30 days and (re)examined the patient.  Any updates or changes from those notes are reflected in this note.

## 2024-10-01 PROBLEM — Z86.0100 PERSONAL HISTORY OF COLON POLYPS, UNSPECIFIED: Status: ACTIVE | Noted: 2022-11-03

## 2025-01-13 PROBLEM — R07.9 CHEST PAIN, UNSPECIFIED TYPE: Status: RESOLVED | Noted: 2023-09-13 | Resolved: 2025-01-13

## 2025-01-23 ENCOUNTER — OFFICE VISIT (OUTPATIENT)
Dept: FAMILY MEDICINE | Facility: CLINIC | Age: 55
End: 2025-01-23
Attending: NURSE PRACTITIONER
Payer: COMMERCIAL

## 2025-01-23 VITALS
RESPIRATION RATE: 18 BRPM | BODY MASS INDEX: 35.77 KG/M2 | DIASTOLIC BLOOD PRESSURE: 78 MMHG | WEIGHT: 236 LBS | HEART RATE: 67 BPM | SYSTOLIC BLOOD PRESSURE: 128 MMHG | TEMPERATURE: 97.9 F | HEIGHT: 68 IN | OXYGEN SATURATION: 98 %

## 2025-01-23 DIAGNOSIS — E66.3 OVERWEIGHT: ICD-10-CM

## 2025-01-23 DIAGNOSIS — E66.01 MORBID OBESITY (H): ICD-10-CM

## 2025-01-23 DIAGNOSIS — J31.0 CHRONIC RHINITIS: ICD-10-CM

## 2025-01-23 DIAGNOSIS — Z00.00 ROUTINE GENERAL MEDICAL EXAMINATION AT A HEALTH CARE FACILITY: Primary | ICD-10-CM

## 2025-01-23 DIAGNOSIS — M72.2 PLANTAR FASCIITIS OF RIGHT FOOT: ICD-10-CM

## 2025-01-23 DIAGNOSIS — Z13.220 LIPID SCREENING: ICD-10-CM

## 2025-01-23 DIAGNOSIS — N52.9 PRIMARY ERECTILE DYSFUNCTION: ICD-10-CM

## 2025-01-23 DIAGNOSIS — Z12.5 SCREENING FOR PROSTATE CANCER: ICD-10-CM

## 2025-01-23 LAB
ALBUMIN SERPL BCG-MCNC: 4.5 G/DL (ref 3.5–5.2)
ALP SERPL-CCNC: 61 U/L (ref 40–150)
ALT SERPL W P-5'-P-CCNC: 15 U/L (ref 0–70)
ANION GAP SERPL CALCULATED.3IONS-SCNC: 9 MMOL/L (ref 7–15)
AST SERPL W P-5'-P-CCNC: 28 U/L (ref 0–45)
BILIRUB SERPL-MCNC: 0.8 MG/DL
BUN SERPL-MCNC: 14.1 MG/DL (ref 6–20)
CALCIUM SERPL-MCNC: 10.3 MG/DL (ref 8.8–10.4)
CHLORIDE SERPL-SCNC: 103 MMOL/L (ref 98–107)
CHOLEST SERPL-MCNC: 147 MG/DL
CREAT SERPL-MCNC: 0.83 MG/DL (ref 0.67–1.17)
EGFRCR SERPLBLD CKD-EPI 2021: >90 ML/MIN/1.73M2
ERYTHROCYTE [DISTWIDTH] IN BLOOD BY AUTOMATED COUNT: 11.9 % (ref 10–15)
EST. AVERAGE GLUCOSE BLD GHB EST-MCNC: 108 MG/DL
FASTING STATUS PATIENT QL REPORTED: YES
FASTING STATUS PATIENT QL REPORTED: YES
GLUCOSE SERPL-MCNC: 84 MG/DL (ref 70–99)
HBA1C MFR BLD: 5.4 % (ref 0–5.6)
HCO3 SERPL-SCNC: 27 MMOL/L (ref 22–29)
HCT VFR BLD AUTO: 41.3 % (ref 40–53)
HDLC SERPL-MCNC: 46 MG/DL
HGB BLD-MCNC: 14.7 G/DL (ref 13.3–17.7)
LDLC SERPL CALC-MCNC: 91 MG/DL
MCH RBC QN AUTO: 30.8 PG (ref 26.5–33)
MCHC RBC AUTO-ENTMCNC: 35.6 G/DL (ref 31.5–36.5)
MCV RBC AUTO: 86 FL (ref 78–100)
NONHDLC SERPL-MCNC: 101 MG/DL
PLATELET # BLD AUTO: 253 10E3/UL (ref 150–450)
POTASSIUM SERPL-SCNC: 4.2 MMOL/L (ref 3.4–5.3)
PROT SERPL-MCNC: 7.7 G/DL (ref 6.4–8.3)
PSA SERPL DL<=0.01 NG/ML-MCNC: 0.59 NG/ML (ref 0–3.5)
RBC # BLD AUTO: 4.78 10E6/UL (ref 4.4–5.9)
SODIUM SERPL-SCNC: 139 MMOL/L (ref 135–145)
TRIGL SERPL-MCNC: 51 MG/DL
WBC # BLD AUTO: 6.4 10E3/UL (ref 4–11)

## 2025-01-23 RX ORDER — FLUTICASONE PROPIONATE 50 MCG
1 SPRAY, SUSPENSION (ML) NASAL DAILY
Qty: 16 G | Refills: 2 | Status: SHIPPED | OUTPATIENT
Start: 2025-01-23

## 2025-01-23 RX ORDER — TADALAFIL 5 MG/1
5 TABLET ORAL DAILY
Qty: 90 TABLET | Refills: 3 | Status: SHIPPED | OUTPATIENT
Start: 2025-01-23

## 2025-01-23 RX ORDER — FEXOFENADINE HCL 180 MG/1
180 TABLET ORAL DAILY
Qty: 90 TABLET | Refills: 1 | Status: SHIPPED | OUTPATIENT
Start: 2025-01-23

## 2025-01-23 RX ORDER — SCOPOLAMINE 1 MG/3D
PATCH, EXTENDED RELEASE TRANSDERMAL
Qty: 4 PATCH | Refills: 4 | Status: CANCELLED | OUTPATIENT
Start: 2025-01-23

## 2025-01-23 SDOH — HEALTH STABILITY: PHYSICAL HEALTH: ON AVERAGE, HOW MANY MINUTES DO YOU ENGAGE IN EXERCISE AT THIS LEVEL?: 50 MIN

## 2025-01-23 SDOH — HEALTH STABILITY: PHYSICAL HEALTH: ON AVERAGE, HOW MANY DAYS PER WEEK DO YOU ENGAGE IN MODERATE TO STRENUOUS EXERCISE (LIKE A BRISK WALK)?: 1 DAY

## 2025-01-23 ASSESSMENT — SOCIAL DETERMINANTS OF HEALTH (SDOH): HOW OFTEN DO YOU GET TOGETHER WITH FRIENDS OR RELATIVES?: MORE THAN THREE TIMES A WEEK

## 2025-01-23 ASSESSMENT — PAIN SCALES - GENERAL: PAINLEVEL_OUTOF10: NO PAIN (0)

## 2025-01-23 NOTE — PROGRESS NOTES
Preventive Care Visit  [facility]  CHASE Stinson CNP, Family Medicine  Jan 23, 2025      Assessment & Plan     Routine general medical examination at a health care facility  Exam completed today.  Discussed routine healthcare maintenance including importance of healthy diet, regular exercise to promote weight loss.  Discussed the importance of routine cancer screenings.  He is up-to-date on colonoscopy.  Will check PSA level today given family history of prostate cancer in his brother.  Labs will be obtained as noted below and will follow-up with results when available.  He is advised to follow-up in 1 year for next annual exam or sooner with any new concerns.  - Comprehensive metabolic panel (BMP + Alb, Alk Phos, ALT, AST, Total. Bili, TP)  - CBC with platelets  - Comprehensive metabolic panel (BMP + Alb, Alk Phos, ALT, AST, Total. Bili, TP)  - CBC with platelets    Overweight  We discussed options for medical weight management as well as referral to weight loss clinic.  Patient prefers to work on diet exercise initially and will notify us if he would like a referral for this.  - Hemoglobin A1c  - Hemoglobin A1c    Chronic rhinitis  Continues Allegra and Flonase.  - fexofenadine (ALLEGRA) 180 MG tablet  Dispense: 90 tablet; Refill: 1  - fluticasone (FLONASE) 50 MCG/ACT nasal spray  Dispense: 16 g; Refill: 2    Primary erectile dysfunction  He is doing well with Cialis 5 mg.  - tadalafil (CIALIS) 5 MG tablet  Dispense: 90 tablet; Refill: 3    Plantar fasciitis of right foot  Symptoms are most consistent with plantar fasciitis.  Offered imaging today which patient declines today.  We discussed conservative treatment measures for plantar fasciitis.  He will reach out if symptoms persist or worsen and we will plan for imaging or referral to podiatry at that time.  - XR Foot Right G/E 3 Views    Lipid screening  Will obtain fasting lipids today.  - Lipid panel reflex to direct LDL Fasting  -  "Lipid panel reflex to direct LDL Fasting    Screening for prostate cancer  Reviewed family history of prostate cancer in his brother.  Will check PSA level today.  - PSA, screen  - PSA, screen            BMI  Estimated body mass index is 36.15 kg/m  as calculated from the following:    Height as of this encounter: 1.721 m (5' 7.75\").    Weight as of this encounter: 107 kg (236 lb).   Weight management plan: Discussed healthy diet and exercise guidelines    Counseling  Appropriate preventive services were addressed with this patient via screening, questionnaire, or discussion as appropriate for fall prevention, nutrition, physical activity, Tobacco-use cessation, social engagement, weight loss and cognition.  Checklist reviewing preventive services available has been given to the patient.  Reviewed patient's diet, addressing concerns and/or questions.   He is at risk for lack of exercise and has been provided with information to increase physical activity for the benefit of his well-being.         Yobany Maciel is a 54 year old, presenting for the following:  Physical (fasting), Weight, and Plantar Fascitis        4/16/2024     3:50 PM   Additional Questions   Roomed by SRINIVAS Ruiz  The patient presents today for his annual exam.  Medical history significant for obesity, chronic rhinitis, erectile dysfunction.  He has a family history of diabetes and prostate cancer in his brother.  He states that he has been doing relatively well over the past year.  He struggles with his weight but states that he could work on getting more exercise and eating better.  He works third shift and often eats carbs, unhealthy meals.  He has seen the weight loss clinic before and may be interested in this at some point again in the future.  He otherwise denies any changes to his health.  He continues use of Cialis for ED.  Otherwise only uses Flonase, Allegra for seasonal allergies on a multivitamin and vitamin D.  He " has been suffering from symptoms of plantar fasciitis for the last several months.  He states that it is likely precipitated by his job where he is on his feet for 12 hours a day.  Pain is mostly in the bottom of his heel, worse upon waking up in the morning and the longer he is weightbearing.  He denies any injury to the foot otherwise.  He has been trying to stretch it.  He does report a history of a stress fracture over 10 years ago.    Health Care Directive  Patient does not have a Health Care Directive: Discussed advance care planning with patient; information given to patient to review.      1/23/2025   General Health   How would you rate your overall physical health? Good   Feel stress (tense, anxious, or unable to sleep) Only a little   (!) STRESS CONCERN      1/23/2025   Nutrition   Three or more servings of calcium each day? (!) I DON'T KNOW   Diet: Regular (no restrictions)   How many servings of fruit and vegetables per day? (!) 2-3   How many sweetened beverages each day? (!) 4+         1/23/2025   Exercise   Days per week of moderate/strenous exercise 1 day   Average minutes spent exercising at this level 50 min   (!) EXERCISE CONCERN      1/23/2025   Social Factors   Frequency of gathering with friends or relatives More than three times a week   Worry food won't last until get money to buy more No   Food not last or not have enough money for food? No   Do you have housing? (Housing is defined as stable permanent housing and does not include staying ouside in a car, in a tent, in an abandoned building, in an overnight shelter, or couch-surfing.) Yes   Are you worried about losing your housing? No   Lack of transportation? No   Unable to get utilities (heat,electricity)? No         1/23/2025   Fall Risk   Fallen 2 or more times in the past year? No   Trouble with walking or balance? No          1/23/2025   Dental   Dentist two times every year? Yes         1/23/2025   TB Screening   Were you born  outside of the [place]? No         Today's PHQ-2 Score:       2025     9:01 AM   PHQ-2 (  [employer])   Q1: Little interest or pleasure in doing things 2   Q2: Feeling down, depressed or hopeless 0   PHQ-2 Score 2    Q1: Little interest or pleasure in doing things More than half the days   Q2: Feeling down, depressed or hopeless Not at all   PHQ-2 Score 2       Patient-reported           2025   Substance Use   Alcohol more than 3/day or more than 7/wk No   Do you use any other substances recreationally? (!) PRESCRIPTION DRUGS     Social History     Tobacco Use    Smoking status: Former     Current packs/day: 0.00     Types: Cigarettes     Quit date: 1998     Years since quittin.0    Smokeless tobacco: Never   Vaping Use    Vaping status: Never Used   Substance Use Topics    Alcohol use: No     Comment: quit 8 years ()    Drug use: No           2025   STI Screening   New sexual partner(s) since last STI/HIV test? No   ASCVD Risk   The 10-year ASCVD risk score (El CM, et al., 2019) is: 4.8%    Values used to calculate the score:      Age: 54 years      Sex: Male      Is Non- : No      Diabetic: No      Tobacco smoker: No      Systolic Blood Pressure: 128 mmHg      Is BP treated: No      HDL Cholesterol: 42 mg/dL      Total Cholesterol: 167 mg/dL           Reviewed and updated as needed this visit by Provider                    Past Medical History:   Diagnosis Date    Anxiety     CARDIOVASCULAR SCREENING; LDL GOAL LESS THAN 160 2011    History of ETOH abuse     ANTHONY (obstructive sleep apnea)     States he lost weight and no longer has    PONV (postoperative nausea and vomiting)     Sleep apnea      Past Surgical History:   Procedure Laterality Date    AS MUSC/TENDON REPAIR EACH; ARM/ELBOW Left     tendon injury - L arm    COLONOSCOPY N/A 2024    Procedure: COLONOSCOPY, BIOPSY and POLYPECTOMY;  Surgeon: Octaviano Ambriz MD;  Location: [place]  "Main OR    EYE SURGERY      lasik    HERNIA REPAIR Right 01/01/1988    OTHER SURGICAL HISTORY Left     knuckle surgerymiddle digit on left hand - due to OA    ZZHC COLONOSCOPY W/WO BRUSH/WASH N/A 05/25/2021    Procedure: COLONOSCOPY;  Surgeon: Octaviano Ambriz MD;  Location: [facility];  Service: General       Review of Systems - pertinent positives noted in HPI, otherwise ROS is negative.       Objective    Exam  /78   Pulse 67   Temp 97.9  F (36.6  C) (Oral)   Resp 18   Ht 1.721 m (5' 7.75\")   Wt 107 kg (236 lb)   SpO2 98%   BMI 36.15 kg/m     Estimated body mass index is 36.15 kg/m  as calculated from the following:    Height as of this encounter: 1.721 m (5' 7.75\").    Weight as of this encounter: 107 kg (236 lb).    Physical Exam  GENERAL: alert and no distress  EYES: Eyes grossly normal to inspection, PERRL and conjunctivae and sclerae normal  HENT: ear canals and TM's normal, nose and mouth without ulcers or lesions  NECK: no adenopathy, no asymmetry, masses, or scars  RESP: lungs clear to auscultation - no rales, rhonchi or wheezes  CV: regular rate and rhythm, normal S1 S2, no S3 or S4, no murmur, click or rub, no peripheral edema  ABDOMEN: soft, nontender, no hepatosplenomegaly, no masses and bowel sounds normal  MS: no gross musculoskeletal defects noted, no edema.  Patient has tenderness upon palpation of the right calcaneus.  Range of motion, strength and sensation otherwise intact in the right foot.  SKIN: no suspicious lesions or rashes  NEURO: Normal strength and tone, mentation intact and speech normal  PSYCH: mentation appears normal, affect normal/bright        Signed Electronically by: CHASE Stinson CNP    "

## 2025-01-23 NOTE — PATIENT INSTRUCTIONS
Patient Education   Preventive Care Advice   This is general advice given by our system to help you stay healthy. However, your care team may have specific advice just for you. Please talk to your care team about your preventive care needs.  Nutrition  Eat 5 or more servings of fruits and vegetables each day.  Try wheat bread, brown rice and whole grain pasta (instead of white bread, rice, and pasta).  Get enough calcium and vitamin D. Check the label on foods and aim for 100% of the RDA (recommended daily allowance).  Lifestyle  Exercise at least 150 minutes each week  (30 minutes a day, 5 days a week).  Do muscle strengthening activities 2 days a week. These help control your weight and prevent disease.  No smoking.  Wear sunscreen to prevent skin cancer.  Have a dental exam and cleaning every 6 months.  Yearly exams  See your health care team every year to talk about:  Any changes in your health.  Any medicines your care team has prescribed.  Preventive care, family planning, and ways to prevent chronic diseases.  Shots (vaccines)   HPV shots (up to age 26), if you've never had them before.  Hepatitis B shots (up to age 59), if you've never had them before.  COVID-19 shot: Get this shot when it's due.  Flu shot: Get a flu shot every year.  Tetanus shot: Get a tetanus shot every 10 years.  Pneumococcal, hepatitis A, and RSV shots: Ask your care team if you need these based on your risk.  Shingles shot (for age 50 and up)  General health tests  Diabetes screening:  Starting at age 35, Get screened for diabetes at least every 3 years.  If you are younger than age 35, ask your care team if you should be screened for diabetes.  Cholesterol test: At age 39, start having a cholesterol test every 5 years, or more often if advised.  Bone density scan (DEXA): At age 50, ask your care team if you should have this scan for osteoporosis (brittle bones).  Hepatitis C: Get tested at least once in your life.  STIs (sexually  transmitted infections)  Before age 24: Ask your care team if you should be screened for STIs.  After age 24: Get screened for STIs if you're at risk. You are at risk for STIs (including HIV) if:  You are sexually active with more than one person.  You don't use condoms every time.  You or a partner was diagnosed with a sexually transmitted infection.  If you are at risk for HIV, ask about PrEP medicine to prevent HIV.  Get tested for HIV at least once in your life, whether you are at risk for HIV or not.  Cancer screening tests  Cervical cancer screening: If you have a cervix, begin getting regular cervical cancer screening tests starting at age 21.  Breast cancer scan (mammogram): If you've ever had breasts, begin having regular mammograms starting at age 40. This is a scan to check for breast cancer.  Colon cancer screening: It is important to start screening for colon cancer at age 45.  Have a colonoscopy test every 10 years (or more often if you're at risk) Or, ask your provider about stool tests like a FIT test every year or Cologuard test every 3 years.  To learn more about your testing options, visit:   .  For help making a decision, visit:   https://bit.ly/it03991.  Prostate cancer screening test: If you have a prostate, ask your care team if a prostate cancer screening test (PSA) at age 55 is right for you.  Lung cancer screening: If you are a current or former smoker ages 50 to 80, ask your care team if ongoing lung cancer screenings are right for you.  For informational purposes only. Not to replace the advice of your health care provider. Copyright   2023 [facility]. All rights reserved. Clinically reviewed by the [facility]. Contur 170572 - REV 01/24.  Substance Use Disorder: Care Instructions  Overview     You can improve your life and health by stopping your use of alcohol or drugs. When you don't drink or use drugs, you may feel and sleep better. You may  get along better with your family, friends, and coworkers. There are medicines and programs that can help with substance use disorder.  How can you care for yourself at home?  Here are some ways to help you stay sober and prevent relapse.  If you have been given medicine to help keep you sober or reduce your cravings, be sure to take it exactly as prescribed.  Talk to your doctor about programs that can help you stop using drugs or drinking alcohol.  Do not keep alcohol or drugs in your home.  Plan ahead. Think about what you'll say if other people ask you to drink or use drugs. Try not to spend time with people who drink or use drugs.  Use the time and money spent on drinking or drugs to do something that's important to you.  Preventing a relapse  Have a plan to deal with relapse. Learn to recognize changes in your thinking that lead you to drink or use drugs. Get help before you start to drink or use drugs again.  Try to stay away from situations, friends, or places that may lead you to drink or use drugs.  If you feel the need to drink alcohol or use drugs again, seek help right away. Call a trusted friend or family member. Some people get support from organizations such as Narcotics Anonymous or [facility] [facility] or from treatment facilities.  If you relapse, get help as soon as you can. Some people make a plan with another person that outlines what they want that person to do for them if they relapse. The plan usually includes how to handle the relapse and who to notify in case of relapse.  Don't give up. Remember that a relapse doesn't mean that you have failed. Use the experience to learn the triggers that lead you to drink or use drugs. Then quit again. Recovery is a lifelong process. Many people have several relapses before they are able to quit for good.  Follow-up care is a key part of your treatment and safety. Be sure to make and go to all appointments, and call your doctor if you are having problems. It's  "also a good idea to know your test results and keep a list of the medicines you take.  When should you call for help?   Call 911  anytime you think you may need emergency care. For example, call if you or someone else:    Has overdosed or has withdrawal signs. Be sure to tell the emergency workers that you are or someone else is using or trying to quit using drugs. Overdose or withdrawal signs may include:  Losing consciousness.  Seizure.  Seeing or hearing things that aren't there (hallucinations).     Is thinking or talking about suicide or harming others.   Where to get help 24 hours a day, 7 days a week   If you or someone you know talks about suicide, self-harm, a mental health crisis, a substance use crisis, or any other kind of emotional distress, get help right away. You can:    Call the Suicide and Crisis Lifeline at 988.     Call 7-133-509-TALK (1-892.255.8472).     Text HOME to 233376 to access the Crisis Text Line.   Consider saving these numbers in your phone.  Go to [employer] for more information or to chat online.  Call your doctor now or seek immediate medical care if:    You are having withdrawal symptoms. These may include nausea or vomiting, sweating, shakiness, and anxiety.   Watch closely for changes in your health, and be sure to contact your doctor if:    You have a relapse.     You need more help or support to stop.   Where can you learn more?  Go to https://www.[employer].net/patiented  Enter H573 in the search box to learn more about \"Substance Use Disorder: Care Instructions.\"  Current as of: November 15, 2023  Content Version: 14.3    2024 [employer].   Care instructions adapted under license by your healthcare professional. If you have questions about a medical condition or this instruction, always ask your healthcare professional. [employer] disclaims any warranty or liability for your use of this information.       "

## 2025-05-02 ENCOUNTER — MYC MEDICAL ADVICE (OUTPATIENT)
Dept: FAMILY MEDICINE | Facility: CLINIC | Age: 55
End: 2025-05-02
Payer: COMMERCIAL

## 2025-05-02 DIAGNOSIS — J31.0 CHRONIC RHINITIS: ICD-10-CM

## 2025-05-02 DIAGNOSIS — H65.03 NON-RECURRENT ACUTE SEROUS OTITIS MEDIA OF BOTH EARS: ICD-10-CM

## 2025-05-02 DIAGNOSIS — N52.9 PRIMARY ERECTILE DYSFUNCTION: ICD-10-CM

## 2025-05-02 DIAGNOSIS — J06.9 VIRAL URI WITH COUGH: ICD-10-CM

## 2025-05-02 DIAGNOSIS — E55.9 VITAMIN D DEFICIENCY: ICD-10-CM

## 2025-05-05 RX ORDER — VITAMIN B COMPLEX
25 TABLET ORAL DAILY
Qty: 90 TABLET | Refills: 3 | Status: SHIPPED | OUTPATIENT
Start: 2025-05-05

## 2025-05-05 RX ORDER — FEXOFENADINE HCL 180 MG/1
180 TABLET ORAL DAILY
Qty: 90 TABLET | Refills: 3 | Status: SHIPPED | OUTPATIENT
Start: 2025-05-05

## 2025-05-05 RX ORDER — TADALAFIL 5 MG/1
5 TABLET ORAL DAILY
Qty: 90 TABLET | Refills: 3 | Status: SHIPPED | OUTPATIENT
Start: 2025-05-05

## 2025-05-05 RX ORDER — FLUTICASONE PROPIONATE 50 MCG
1 SPRAY, SUSPENSION (ML) NASAL DAILY
Qty: 16 G | Refills: 3 | Status: SHIPPED | OUTPATIENT
Start: 2025-05-05

## 2025-05-05 NOTE — TELEPHONE ENCOUNTER
Orders pended for provider to review and approve if appropriate.     Henry Bo RN  [facility]

## 2025-08-10 ENCOUNTER — MYC MEDICAL ADVICE (OUTPATIENT)
Dept: FAMILY MEDICINE | Facility: CLINIC | Age: 55
End: 2025-08-10
Payer: COMMERCIAL

## 2025-08-10 DIAGNOSIS — T75.3XXD MOTION SICKNESS, SUBSEQUENT ENCOUNTER: ICD-10-CM

## 2025-08-11 RX ORDER — SCOPOLAMINE 1 MG/3D
PATCH, EXTENDED RELEASE TRANSDERMAL
Qty: 4 PATCH | Refills: 0 | Status: SHIPPED | OUTPATIENT
Start: 2025-08-11

## (undated) RX ORDER — LIDOCAINE HYDROCHLORIDE 10 MG/ML
INJECTION, SOLUTION EPIDURAL; INFILTRATION; INTRACAUDAL; PERINEURAL
Status: DISPENSED
Start: 2018-05-19